# Patient Record
Sex: FEMALE | Race: BLACK OR AFRICAN AMERICAN | NOT HISPANIC OR LATINO | Employment: OTHER | URBAN - METROPOLITAN AREA
[De-identification: names, ages, dates, MRNs, and addresses within clinical notes are randomized per-mention and may not be internally consistent; named-entity substitution may affect disease eponyms.]

---

## 2017-01-05 ENCOUNTER — APPOINTMENT (OUTPATIENT)
Dept: ONCOLOGY | Facility: MEDICAL CENTER | Age: 62
End: 2017-01-05
Attending: INTERNAL MEDICINE
Payer: MEDICAID

## 2017-01-12 ENCOUNTER — OUTPATIENT INFUSION SERVICES (OUTPATIENT)
Dept: ONCOLOGY | Facility: MEDICAL CENTER | Age: 62
End: 2017-01-12
Attending: INTERNAL MEDICINE
Payer: MEDICAID

## 2017-01-12 VITALS
DIASTOLIC BLOOD PRESSURE: 63 MMHG | TEMPERATURE: 97 F | SYSTOLIC BLOOD PRESSURE: 152 MMHG | RESPIRATION RATE: 18 BRPM | WEIGHT: 193.12 LBS | HEIGHT: 68 IN | BODY MASS INDEX: 29.27 KG/M2 | OXYGEN SATURATION: 97 % | HEART RATE: 98 BPM

## 2017-01-12 PROCEDURE — 700111 HCHG RX REV CODE 636 W/ 250 OVERRIDE (IP)

## 2017-01-12 PROCEDURE — 700101 HCHG RX REV CODE 250

## 2017-01-12 PROCEDURE — A4212 NON CORING NEEDLE OR STYLET: HCPCS

## 2017-01-12 PROCEDURE — 96523 IRRIG DRUG DELIVERY DEVICE: CPT

## 2017-01-12 RX ORDER — LIDOCAINE HYDROCHLORIDE 10 MG/ML
INJECTION, SOLUTION INFILTRATION; PERINEURAL
Status: COMPLETED
Start: 2017-01-12 | End: 2017-01-12

## 2017-01-12 RX ADMIN — HEPARIN 500 UNITS: 100 SYRINGE at 09:23

## 2017-01-12 RX ADMIN — LIDOCAINE HYDROCHLORIDE: 10 INJECTION, SOLUTION INFILTRATION; PERINEURAL at 09:14

## 2017-01-12 NOTE — PROGRESS NOTES
Patient arrived for port flush appt; pt states no changes or significant health issues.  Pt applied emla cream prior, requested lidocaine as well.  Lidocaine given, port accessed in sterile field, flushed, brisk blood return noted.  Flushed port per protocol; deaccessed port, placed gauze dressing in place.  Pt left in good spirits, went to  to make next port flush appt for 4-6 weeks.

## 2017-01-25 ENCOUNTER — HOSPITAL ENCOUNTER (EMERGENCY)
Facility: MEDICAL CENTER | Age: 62
End: 2017-01-25
Attending: EMERGENCY MEDICINE
Payer: MEDICAID

## 2017-01-25 ENCOUNTER — APPOINTMENT (OUTPATIENT)
Dept: RADIOLOGY | Facility: MEDICAL CENTER | Age: 62
End: 2017-01-25
Attending: EMERGENCY MEDICINE
Payer: MEDICAID

## 2017-01-25 VITALS
OXYGEN SATURATION: 94 % | DIASTOLIC BLOOD PRESSURE: 87 MMHG | HEART RATE: 88 BPM | BODY MASS INDEX: 29.58 KG/M2 | TEMPERATURE: 97.7 F | SYSTOLIC BLOOD PRESSURE: 136 MMHG | WEIGHT: 188.49 LBS | RESPIRATION RATE: 18 BRPM | HEIGHT: 67 IN

## 2017-01-25 DIAGNOSIS — R10.9 CHRONIC ABDOMINAL PAIN: ICD-10-CM

## 2017-01-25 DIAGNOSIS — E11.9 TYPE 2 DIABETES MELLITUS WITHOUT COMPLICATION, WITHOUT LONG-TERM CURRENT USE OF INSULIN (HCC): ICD-10-CM

## 2017-01-25 DIAGNOSIS — C21.0 ANAL CARCINOMA (HCC): ICD-10-CM

## 2017-01-25 DIAGNOSIS — G89.29 CHRONIC ABDOMINAL PAIN: ICD-10-CM

## 2017-01-25 LAB
ALBUMIN SERPL BCP-MCNC: 4.1 G/DL (ref 3.2–4.9)
ALBUMIN/GLOB SERPL: 1.1 G/DL
ALP SERPL-CCNC: 89 U/L (ref 30–99)
ALT SERPL-CCNC: 10 U/L (ref 2–50)
ANION GAP SERPL CALC-SCNC: 9 MMOL/L (ref 0–11.9)
APPEARANCE UR: CLEAR
APTT PPP: 34.8 SEC (ref 24.7–36)
AST SERPL-CCNC: 15 U/L (ref 12–45)
BACTERIA #/AREA URNS HPF: ABNORMAL /HPF
BASOPHILS # BLD AUTO: 0.2 % (ref 0–1.8)
BASOPHILS # BLD: 0.01 K/UL (ref 0–0.12)
BILIRUB SERPL-MCNC: 0.6 MG/DL (ref 0.1–1.5)
BILIRUB UR QL STRIP.AUTO: NEGATIVE
BUN SERPL-MCNC: 13 MG/DL (ref 8–22)
CALCIUM SERPL-MCNC: 9.4 MG/DL (ref 8.5–10.5)
CHLORIDE SERPL-SCNC: 105 MMOL/L (ref 96–112)
CO2 SERPL-SCNC: 25 MMOL/L (ref 20–33)
COLOR UR: ABNORMAL
CREAT SERPL-MCNC: 0.67 MG/DL (ref 0.5–1.4)
CULTURE IF INDICATED INDCX: YES UA CULTURE
EKG IMPRESSION: NORMAL
EOSINOPHIL # BLD AUTO: 0.04 K/UL (ref 0–0.51)
EOSINOPHIL NFR BLD: 0.6 % (ref 0–6.9)
EPI CELLS #/AREA URNS HPF: ABNORMAL /HPF
ERYTHROCYTE [DISTWIDTH] IN BLOOD BY AUTOMATED COUNT: 45.5 FL (ref 35.9–50)
GFR SERPL CREATININE-BSD FRML MDRD: >60 ML/MIN/1.73 M 2
GLOBULIN SER CALC-MCNC: 3.9 G/DL (ref 1.9–3.5)
GLUCOSE SERPL-MCNC: 109 MG/DL (ref 65–99)
GLUCOSE UR STRIP.AUTO-MCNC: NEGATIVE MG/DL
HCT VFR BLD AUTO: 37.8 % (ref 37–47)
HGB BLD-MCNC: 12.5 G/DL (ref 12–16)
IMM GRANULOCYTES # BLD AUTO: 0.03 K/UL (ref 0–0.11)
IMM GRANULOCYTES NFR BLD AUTO: 0.5 % (ref 0–0.9)
INR PPP: 1.12 (ref 0.87–1.13)
KETONES UR STRIP.AUTO-MCNC: NEGATIVE MG/DL
LEUKOCYTE ESTERASE UR QL STRIP.AUTO: ABNORMAL
LIPASE SERPL-CCNC: 34 U/L (ref 11–82)
LYMPHOCYTES # BLD AUTO: 1.25 K/UL (ref 1–4.8)
LYMPHOCYTES NFR BLD: 19.4 % (ref 22–41)
MCH RBC QN AUTO: 30.8 PG (ref 27–33)
MCHC RBC AUTO-ENTMCNC: 33.1 G/DL (ref 33.6–35)
MCV RBC AUTO: 93.1 FL (ref 81.4–97.8)
MICRO URNS: ABNORMAL
MONOCYTES # BLD AUTO: 0.57 K/UL (ref 0–0.85)
MONOCYTES NFR BLD AUTO: 8.9 % (ref 0–13.4)
NEUTROPHILS # BLD AUTO: 4.53 K/UL (ref 2–7.15)
NEUTROPHILS NFR BLD: 70.4 % (ref 44–72)
NITRITE UR QL STRIP.AUTO: NEGATIVE
NRBC # BLD AUTO: 0 K/UL
NRBC BLD AUTO-RTO: 0 /100 WBC
PH UR STRIP.AUTO: 6.5 [PH]
PLATELET # BLD AUTO: 308 K/UL (ref 164–446)
PMV BLD AUTO: 8.9 FL (ref 9–12.9)
POTASSIUM SERPL-SCNC: 3.9 MMOL/L (ref 3.6–5.5)
PROT SERPL-MCNC: 8 G/DL (ref 6–8.2)
PROT UR QL STRIP: NEGATIVE MG/DL
PROTHROMBIN TIME: 14.8 SEC (ref 12–14.6)
RBC # BLD AUTO: 4.06 M/UL (ref 4.2–5.4)
RBC # URNS HPF: ABNORMAL /HPF
RBC UR QL AUTO: NEGATIVE
SODIUM SERPL-SCNC: 139 MMOL/L (ref 135–145)
SP GR UR STRIP.AUTO: 1.01
WBC # BLD AUTO: 6.4 K/UL (ref 4.8–10.8)
WBC #/AREA URNS HPF: ABNORMAL /HPF

## 2017-01-25 PROCEDURE — 87077 CULTURE AEROBIC IDENTIFY: CPT

## 2017-01-25 PROCEDURE — 80053 COMPREHEN METABOLIC PANEL: CPT

## 2017-01-25 PROCEDURE — 700117 HCHG RX CONTRAST REV CODE 255: Performed by: EMERGENCY MEDICINE

## 2017-01-25 PROCEDURE — 74177 CT ABD & PELVIS W/CONTRAST: CPT

## 2017-01-25 PROCEDURE — 99284 EMERGENCY DEPT VISIT MOD MDM: CPT

## 2017-01-25 PROCEDURE — 93005 ELECTROCARDIOGRAM TRACING: CPT

## 2017-01-25 PROCEDURE — 85610 PROTHROMBIN TIME: CPT

## 2017-01-25 PROCEDURE — 73700 CT LOWER EXTREMITY W/O DYE: CPT | Mod: RT

## 2017-01-25 PROCEDURE — 83690 ASSAY OF LIPASE: CPT

## 2017-01-25 PROCEDURE — 96374 THER/PROPH/DIAG INJ IV PUSH: CPT | Mod: XU

## 2017-01-25 PROCEDURE — 700105 HCHG RX REV CODE 258: Performed by: EMERGENCY MEDICINE

## 2017-01-25 PROCEDURE — 96376 TX/PRO/DX INJ SAME DRUG ADON: CPT

## 2017-01-25 PROCEDURE — 700111 HCHG RX REV CODE 636 W/ 250 OVERRIDE (IP): Performed by: EMERGENCY MEDICINE

## 2017-01-25 PROCEDURE — 96375 TX/PRO/DX INJ NEW DRUG ADDON: CPT

## 2017-01-25 PROCEDURE — 96361 HYDRATE IV INFUSION ADD-ON: CPT

## 2017-01-25 PROCEDURE — 700101 HCHG RX REV CODE 250: Performed by: EMERGENCY MEDICINE

## 2017-01-25 PROCEDURE — 87086 URINE CULTURE/COLONY COUNT: CPT

## 2017-01-25 PROCEDURE — 81001 URINALYSIS AUTO W/SCOPE: CPT

## 2017-01-25 PROCEDURE — 85025 COMPLETE CBC W/AUTO DIFF WBC: CPT

## 2017-01-25 PROCEDURE — 85730 THROMBOPLASTIN TIME PARTIAL: CPT

## 2017-01-25 PROCEDURE — 36415 COLL VENOUS BLD VENIPUNCTURE: CPT

## 2017-01-25 RX ORDER — MORPHINE SULFATE 4 MG/ML
4 INJECTION, SOLUTION INTRAMUSCULAR; INTRAVENOUS
Status: DISCONTINUED | OUTPATIENT
Start: 2017-01-25 | End: 2017-01-25 | Stop reason: HOSPADM

## 2017-01-25 RX ORDER — ONDANSETRON 2 MG/ML
4 INJECTION INTRAMUSCULAR; INTRAVENOUS ONCE
Status: COMPLETED | OUTPATIENT
Start: 2017-01-25 | End: 2017-01-25

## 2017-01-25 RX ORDER — SODIUM CHLORIDE 9 MG/ML
1000 INJECTION, SOLUTION INTRAVENOUS ONCE
Status: COMPLETED | OUTPATIENT
Start: 2017-01-25 | End: 2017-01-25

## 2017-01-25 RX ADMIN — HEPARIN 500 UNITS: 100 SYRINGE at 17:41

## 2017-01-25 RX ADMIN — MORPHINE SULFATE 4 MG: 4 INJECTION INTRAVENOUS at 17:09

## 2017-01-25 RX ADMIN — IOHEXOL 100 ML: 350 INJECTION, SOLUTION INTRAVENOUS at 16:09

## 2017-01-25 RX ADMIN — ONDANSETRON 4 MG: 2 INJECTION, SOLUTION INTRAMUSCULAR; INTRAVENOUS at 14:32

## 2017-01-25 RX ADMIN — MORPHINE SULFATE 4 MG: 4 INJECTION INTRAVENOUS at 14:32

## 2017-01-25 RX ADMIN — SODIUM CHLORIDE 1000 ML: 9 INJECTION, SOLUTION INTRAVENOUS at 14:38

## 2017-01-25 RX ADMIN — TETRACAINE HCL 3 ML: 10 INJECTION SUBARACHNOID at 13:56

## 2017-01-25 ASSESSMENT — ENCOUNTER SYMPTOMS
DIARRHEA: 0
FEVER: 0
CHILLS: 0
FALLS: 1
VOMITING: 0
NAUSEA: 1
ABDOMINAL PAIN: 1

## 2017-01-25 ASSESSMENT — PAIN SCALES - GENERAL
PAINLEVEL_OUTOF10: 3
PAINLEVEL_OUTOF10: 6
PAINLEVEL_OUTOF10: 4
PAINLEVEL_OUTOF10: 10

## 2017-01-25 ASSESSMENT — LIFESTYLE VARIABLES: DO YOU DRINK ALCOHOL: NO

## 2017-01-25 NOTE — ED NOTES
C/o R hip/groin pain s/p mglf 1 week ago, pain has varied, able to walk unable to straighten leg w/o pain increasing, and abdo pain lower, assoc w mild nausea, no vomiting, does not feel she is completely emptying her bowels, has hx of rectal ca, last chemo 9/16, recently told she is ' cancer free'

## 2017-01-25 NOTE — ED NOTES
Pt. Resting in gurney drinking contrast at this time. Pt. States no additional needs. Will continue to monitor.

## 2017-01-25 NOTE — ED AVS SNAPSHOT
After Visit Summary                                                                                                                Marilyn Strange   MRN: [id number]    Department:  [facility], Emergency Dept   Date of Visit:  1/25/2017            [facility], Emergency Dept    [address] [address]    Phone:  [phone]      You were seen by     Markus Zaragoza M.D.      Your Diagnosis Was     Chronic abdominal pain     R10.9, G89.29       These are the medications you received during your hospitalization from 01/25/2017 1240 to 01/25/2017 1735     Date/Time Order Dose Route Action    01/25/2017 1356 lidocaine-epinephrine-tetracaine (LET) topical soln 3 mL 3 mL Topical Given    01/25/2017 1438 NS infusion 1,000 mL 1,000 mL Intravenous New Bag    01/25/2017 1709 morphine (pf) 4 mg/ml injection 4 mg 4 mg Intravenous Given    01/25/2017 1432 morphine (pf) 4 mg/ml injection 4 mg 4 mg Intravenous Given    01/25/2017 1432 ondansetron (ZOFRAN) syringe/vial injection 4 mg 4 mg Intravenous Given    01/25/2017 1609 iohexol (OMNIPAQUE) 350 mg/mL 100 mL Intravenous Given      Follow-up Information     1. Schedule an appointment as soon as possible for a visit with Terry Dugan M.D..    Specialty:  Oncology    Contact information    [address] [address] [phone]        Medication Information     Review all of your home medications and newly ordered medications with your primary doctor and/or pharmacist as soon as possible. Follow medication instructions as directed by your doctor and/or pharmacist.     Please keep your complete medication list with you and share with your physician. Update the information when medications are discontinued, doses are changed, or new medications (including over-the-counter products) are added; and carry medication information at all times in the event of emergency situations.               Medication List         ASK your doctor about these medications        Instructions    albuterol 108 (90 BASE) MCG/ACT Aers inhalation aerosol    Inhale 2 Puffs by mouth every 6 hours as needed for Shortness of Breath (wheezing).   Dose:  2 Puff       aspirin 81 MG Chew chewable tablet   Commonly known as:  ASA    Take 81 mg by mouth every day.   Dose:  81 mg       atorvastatin 20 MG Tabs   Commonly known as:  LIPITOR    Take 20 mg by mouth every evening.   Dose:  20 mg       BREO ELLIPTA 100-25 MCG/INH Aepb   Generic drug:  Fluticasone Furoate-Vilanterol    Inhale  by mouth.       carvedilol 25 MG Tabs   Commonly known as:  COREG    Take 25 mg by mouth 2 times a day.   Dose:  25 mg       enalapril 20 MG tablet   Commonly known as:  VASOTEC    Take 20 mg by mouth 2 times a day.   Dose:  20 mg       metformin 500 MG Tabs   Commonly known as:  GLUCOPHAGE    Take 500 mg by mouth every day.   Dose:  500 mg       ondansetron 4 MG Tabs tablet   Commonly known as:  ZOFRAN    Take 1 Tab by mouth every four hours as needed for Nausea/Vomiting.   Dose:  4 mg       oxycodone immediate-release 5 MG Tabs   Commonly known as:  ROXICODONE    Take 1 Tab by mouth every 8 hours as needed for Severe Pain.   Dose:  5 mg       oyster shell calcium/vitamin D 250-125 MG-UNIT Tabs tablet    Take 1 Tab by mouth 2 times a day.   Dose:  1 Tab       pantoprazole 40 MG Tbec   Commonly known as:  PROTONIX    Take 40 mg by mouth every day.   Dose:  40 mg       prochlorperazine 10 MG Tabs   Commonly known as:  COMPAZINE    Take 1 Tab by mouth every 6 hours as needed for Nausea/Vomiting.   Dose:  10 mg               Procedures and tests performed during your visit     APTT    CBC WITH DIFFERENTIAL    COMP METABOLIC PANEL    CONSENT FOR CONTRAST INJECTION    CT-ABDOMEN-PELVIS WITH    CT-HIP W/O PLUS RECONS RIGHT    EKG (NOW)    ESTIMATED GFR    FALL RISK PROTOCOL    IV Saline Lock    LIPASE    PROTHROMBIN TIME (INR)    URINALYSIS CULTURE, IF INDICATED    URINE  CULTURE(NEW)    URINE MICROSCOPIC (W/UA)        Discharge Instructions       Abdominal Pain (Nonspecific)  Your exam might not show the exact reason you have abdominal pain. Since there are many different causes of abdominal pain, another checkup and more tests may be needed. It is very important to follow up for lasting (persistent) or worsening symptoms. A possible cause of abdominal pain in any person who still has his or her appendix is acute appendicitis. Appendicitis is often hard to diagnose. Normal blood tests, urine tests, ultrasound, and CT scans do not completely rule out early appendicitis or other causes of abdominal pain. Sometimes, only the changes that happen over time will allow appendicitis and other causes of abdominal pain to be determined. Other potential problems that may require surgery may also take time to become more apparent. Because of this, it is important that you follow all of the instructions below.  HOME CARE INSTRUCTIONS   · Rest as much as possible.   · Do not eat solid food until your pain is gone.   · While adults or children have pain: A diet of water, weak decaffeinated tea, broth or bouillon, gelatin, oral rehydration solutions (ORS), frozen ice pops, or ice chips may be helpful.   · When pain is gone in adults or children: Start a light diet (dry toast, crackers, applesauce, or white rice). Increase the diet slowly as long as it does not bother you. Eat no dairy products (including cheese and eggs) and no spicy, fatty, fried, or high-fiber foods.   · Use no alcohol, caffeine, or cigarettes.   · Take your regular medicines unless your caregiver told you not to.   · Take any prescribed medicine as directed.   · Only take over-the-counter or prescription medicines for pain, discomfort, or fever as directed by your caregiver. Do not give aspirin to children.   If your caregiver has given you a follow-up appointment, it is very important to keep that appointment. Not keeping the  appointment could result in a permanent injury and/or lasting (chronic) pain and/or disability. If there is any problem keeping the appointment, you must call to reschedule.   SEEK IMMEDIATE MEDICAL CARE IF:   · Your pain is not gone in 24 hours.   · Your pain becomes worse, changes location, or feels different.   · You or your child has an oral temperature above 102° F (38.9° C), not controlled by medicine.   · Your baby is older than 3 months with a rectal temperature of 102° F (38.9° C) or higher.   · Your baby is 3 months old or younger with a rectal temperature of 100.4° F (38° C) or higher.   · You have shaking chills.   · You keep throwing up (vomiting) or cannot drink liquids.   · There is blood in your vomit or you see blood in your bowel movements.   · Your bowel movements become dark or black.   · You have frequent bowel movements.   · Your bowel movements stop (become blocked) or you cannot pass gas.   · You have bloody, frequent, or painful urination.   · You have yellow discoloration in the skin or whites of the eyes.   · Your stomach becomes bloated or bigger.   · You have dizziness or fainting.   · You have chest or back pain.   MAKE SURE YOU:   · Understand these instructions.   · Will watch your condition.   · Will get help right away if you are not doing well or get worse.   Document Released: 12/18/2006 Document Revised: 03/11/2013 Document Reviewed: 11/15/2010  ExitCare® Patient Information ©2013 [employer].          Patient Information     Patient Information    Following emergency treatment: all patient requiring follow-up care must return either to a private physician or a clinic if your condition worsens before you are able to obtain further medical attention, please return to the emergency room.     Billing Information    At [facility], we work to make the billing process streamlined for our patients.  Our Representatives are here to answer any questions you may have regarding your  hospital bill.  If you have insurance coverage and have supplied your insurance information to us, we will submit a claim to your insurer on your behalf.  Should you have any questions regarding your bill, we can be reached online or by phone as follows:  Online: You are able pay your bills online or live chat with our representatives about any billing questions you may have. We are here to help Monday - Friday from 8:00am to 7:30pm and 9:00am - 12:00pm on Saturdays.  Please visit [email or web address]  for more information.   Phone:  [phone] or [phone]    Please note that your emergency physician, surgeon, pathologist, radiologist, anesthesiologist, and other specialists are not employed by [facility] and will therefore bill separately for their services.  Please contact them directly for any questions concerning their bills at the numbers below:     Emergency Physician Services:  [phone]  [facility]:  [phone]  [facility]:  [phone]  [facility]:  [phone]    1. Your final bill may vary from the amount quoted upon discharge if all procedures are not complete at that time, or if your doctor has additional procedures of which we are not aware. You will receive an additional bill if you return to the Emergency Department at [facility] for suture removal regardless of the facility of which the sutures were placed.     2. Please arrange for settlement of this account at the emergency registration.    3. All self-pay accounts are due in full at the time of treatment.  If you are unable to meet this obligation then payment is expected within 4-5 days.     4. If you have had radiology studies (CT, X-ray, Ultrasound, MRI), you have received a preliminary result during your emergency department visit. Please contact the radiology department [phone] to receive a copy of your final result. Please discuss the  Final result with your primary physician or with the follow up physician provided.     Crisis Hotline:  [place] Crisis Hotline:  9-265-NXBEKZT or 1-655.403.2383  [place] Crisis Hotline:    1-610.577.7817 or 648-779-4222         ED Discharge Follow Up Questions    1. In order to provide you with very good care, we would like to follow up with a phone call in the next few days.  May we have your permission to contact you?     YES /  NO    2. What is the best phone number to call you? (       )_____-__________    3. What is the best time to call you?      Morning  /  Afternoon  /  Evening                   Patient Signature:  ____________________________________________________________    Date:  ____________________________________________________________      Your appointments     Feb 09, 2017  1:30 PM   EST Port Flush / Central Line Care with RN 5   Infusion Services ([facility])    [address]  [place] [address]   [phone]

## 2017-01-25 NOTE — ED NOTES
Port accessed, refer to lines for specifics about pt's power port. Pt. Tolerated procedure well. Will continue to monitor.

## 2017-01-25 NOTE — ED AVS SNAPSHOT
Planet Payment Access Code: [email or web address]  Expires: 2/24/2017  2:55 PM    Planet Payment  A secure, online tool to manage your health information     [employer]’s Planet Payment® is a secure, online tool that connects you to your personalized health information from the privacy of your home -- day or night - making it very easy for you to manage your healthcare. Once the activation process is completed, you can even access your medical information using the Planet Payment isidoro, which is available for free in the Apple Isidoro store or Google Play store.     Planet Payment provides the following levels of access (as shown below):   My Chart Features   [facility] Primary Care Doctor [facility]  Specialists [facility]  Urgent  Care [facility]  Primary Care  Doctor   Email your healthcare team securely and privately 24/7 X X X X   Manage appointments: schedule your next appointment; view details of past/upcoming appointments X      Request prescription refills. X      View recent personal medical records, including lab and immunizations X X X X   View health record, including health history, allergies, medications X X X X   Read reports about your outpatient visits, procedures, consult and ER notes X X X X   See your discharge summary, which is a recap of your hospital and/or ER visit that includes your diagnosis, lab results, and care plan. X X       How to register for Planet Payment:  1. Go to  [email or web address].  2. Click on the Sign Up Now box, which takes you to the New Member Sign Up page. You will need to provide the following information:  a. Enter your Planet Payment Access Code exactly as it appears at the top of this page. (You will not need to use this code after you’ve completed the sign-up process. If you do not sign up before the expiration date, you must request a new code.)   b. Enter your date of birth.   c. Enter your home email address.   d. Click Submit, and follow the next screen’s instructions.  3. Create a Planet Payment ID. This will be your Planet Payment  login ID and cannot be changed, so think of one that is secure and easy to remember.  4. Create a [employer] password. You can change your password at any time.  5. Enter your Password Reset Question and Answer. This can be used at a later time if you forget your password.   6. Enter your e-mail address. This allows you to receive e-mail notifications when new information is available in [employer].  7. Click Sign Up. You can now view your health information.    For assistance activating your [employer] account, call [phone]

## 2017-01-25 NOTE — ED AVS SNAPSHOT
1/25/2017          Marilyn Cox Strange  [address]  [place] [address]    Dear Marilyn:    [facility] wants to ensure your discharge home is safe and you or your loved ones have had all your questions answered regarding your care after you leave the hospital.    You may receive a telephone call within two days of your discharge.  This call is to make certain you understand your discharge instructions as well as ensure we provided you with the best care possible during your stay with us.     The call will only last approximately 3-5 minutes and will be done by a nurse.    Once again, we want to ensure your discharge home is safe and that you have a clear understanding of any next steps in your care.  If you have any questions or concerns, please do not hesitate to contact us, we are here for you.  Thank you for choosing [facility] for your healthcare needs.    Sincerely,    Mohan Marshall    [facility]

## 2017-01-25 NOTE — ED PROVIDER NOTES
ED Provider Note    Scribed for Markus Zaragoza M.D. by Rylie Swift. 1/25/2017, 1:49 PM.    Primary care provider: REMY [place]  Means of arrival: Walk-in  History obtained from: Patient  History limited by: None    CHIEF COMPLAINT  Chief Complaint   Patient presents with   • Hip Pain   • Abdominal Pain       HPI  Marilyn Strange is a 61 y.o. female who presents to the Emergency Department for worsening lower abdominal pains onset yesterday. The patient reports of developing intermittent lower abdominal pains for the past 2-3 months, but states the pains significantly worsened yesterday and has been constant in nature since. She has associated nausea, but denies any vomiting, diarrhea, fever, chills, or dysuria. The patient also reports of developing right hip/groin pain a week ago after a mechanical ground level fall. She states the pain exacerbates with walking and standing erect and often has to ambulate hunched over. She denies hip pain radiating down bilateral legs or back. The patient has past history of rectal cancer, with 30 days of radiation and 2 infusions, last finished chemotherapy on 9/16, and is still in the process of healing. She follows up with Dr. Sue (Oncology). She notes no allergy to medications.     REVIEW OF SYSTEMS  Review of Systems   Constitutional: Negative for fever and chills.   Gastrointestinal: Positive for nausea and abdominal pain. Negative for vomiting and diarrhea.   Genitourinary: Negative for dysuria.   Musculoskeletal: Positive for joint pain (Hip pain right) and falls.   All other systems reviewed and are negative.    PAST MEDICAL HISTORY   has a past medical history of Rectal cancer (CMS-[facility]); Diabetes (CMS-HCC); Hypertension; Cancer (CMS-[facility]); Cervical cancer (CMS-[facility]); and Breast cancer (CMS-[facility]).    SURGICAL HISTORY   has past surgical history that includes gyn surgery; other orthopedic surgery; and mastectomy.    SOCIAL HISTORY  Social History   Substance  "Use Topics   • Smoking status: Former Smoker   • Smokeless tobacco: Former User     Quit date: 08/21/2007   • Alcohol Use: No      History   Drug Use No       FAMILY HISTORY  History reviewed. No pertinent family history.    CURRENT MEDICATIONS  Home Medications     Reviewed by Lanette Smith R.N. (Registered Nurse) on 01/25/17 at 1337  Med List Status: Partial    Medication Last Dose Status    albuterol 108 (90 BASE) MCG/ACT Aero Soln inhalation aerosol  Active    aspirin (ASA) 81 MG Chew Tab chewable tablet  Active    atorvastatin (LIPITOR) 20 MG Tab  Active    Calcium Carb-Cholecalciferol (OYSTER SHELL CALCIUM/VITAMIN D) 250-125 MG-UNIT Tab tablet  Active    carvedilol (COREG) 25 MG Tab  Active    enalapril (VASOTEC) 20 MG tablet  Active    Fluticasone Furoate-Vilanterol (BREO ELLIPTA) 100-25 MCG/INH AEROSOL POWDER, BREATH ACTIVATED  Active    metformin (GLUCOPHAGE) 500 MG Tab  Active    ondansetron (ZOFRAN) 4 MG Tab tablet  Active    oxycodone immediate-release (ROXICODONE) 5 MG Tab  Active    pantoprazole (PROTONIX) 40 MG Tablet Delayed Response  Active    prochlorperazine (COMPAZINE) 10 MG Tab  Active                ALLERGIES  No Known Allergies    PHYSICAL EXAM  VITAL SIGNS: /87 mmHg  Pulse 96  Temp(Src) 36.5 °C (97.7 °F) (Temporal)  Resp 20  Ht 1.702 m (5' 7\")  Wt 85.5 kg (188 lb 7.9 oz)  BMI 29.52 kg/m2  SpO2 98%    Constitutional: Well developed, Well nourished, No acute distress, Non-toxic appearance.   HENT: Normocephalic, Atraumatic, Bilateral external ears normal, oropharynx moist, No oral exudates, Nose normal.   Eyes: Pupils are equal round and react to light, extraocular motions are intact, conjunctiva is normal, there are no signs of exudate.   Neck: Supple, no meningeal signs.  Lymphatic: No lymphadenopathy noted.   Cardiovascular: Regular rate and rhythm without murmurs gallops or rubs.   Thorax & Lungs: No respiratory distress. Breathing comfortably. Lungs are clear to " auscultation bilaterally, there are no wheezes no rales. Chest wall is nontender.  Abdomen: Soft, tenderness diffusely more in suprapubic area, nondistended.  Skin: Warm, Dry, No erythema,   Back: No tenderness, No CVA tenderness.  Musculoskeletal: Right hip holds in flexed position limited range of motion secondary to pain in right groin area.  Intact distal pulses, no clubbing, no cyanosis, no edema,   Neurologic: Alert & oriented x 3, Moving all extremities. No gross abnormalities.    Psychiatric: Affect normal, Judgment normal, Mood normal.     LABS  Results for orders placed or performed during the hospital encounter of 01/25/17   CBC WITH DIFFERENTIAL   Result Value Ref Range    WBC 6.4 4.8 - 10.8 K/uL    RBC 4.06 (L) 4.20 - 5.40 M/uL    Hemoglobin 12.5 12.0 - 16.0 g/dL    Hematocrit 37.8 37.0 - 47.0 %    MCV 93.1 81.4 - 97.8 fL    MCH 30.8 27.0 - 33.0 pg    MCHC 33.1 (L) 33.6 - 35.0 g/dL    RDW 45.5 35.9 - 50.0 fL    Platelet Count 308 164 - 446 K/uL    MPV 8.9 (L) 9.0 - 12.9 fL    Neutrophils-Polys 70.40 44.00 - 72.00 %    Lymphocytes 19.40 (L) 22.00 - 41.00 %    Monocytes 8.90 0.00 - 13.40 %    Eosinophils 0.60 0.00 - 6.90 %    Basophils 0.20 0.00 - 1.80 %    Immature Granulocytes 0.50 0.00 - 0.90 %    Nucleated RBC 0.00 /100 WBC    Neutrophils (Absolute) 4.53 2.00 - 7.15 K/uL    Lymphs (Absolute) 1.25 1.00 - 4.80 K/uL    Monos (Absolute) 0.57 0.00 - 0.85 K/uL    Eos (Absolute) 0.04 0.00 - 0.51 K/uL    Baso (Absolute) 0.01 0.00 - 0.12 K/uL    Immature Granulocytes (abs) 0.03 0.00 - 0.11 K/uL    NRBC (Absolute) 0.00 K/uL   COMP METABOLIC PANEL   Result Value Ref Range    Sodium 139 135 - 145 mmol/L    Potassium 3.9 3.6 - 5.5 mmol/L    Chloride 105 96 - 112 mmol/L    Co2 25 20 - 33 mmol/L    Anion Gap 9.0 0.0 - 11.9    Glucose 109 (H) 65 - 99 mg/dL    Bun 13 8 - 22 mg/dL    Creatinine 0.67 0.50 - 1.40 mg/dL    Calcium 9.4 8.5 - 10.5 mg/dL    AST(SGOT) 15 12 - 45 U/L    ALT(SGPT) 10 2 - 50 U/L    Alkaline  Phosphatase 89 30 - 99 U/L    Total Bilirubin 0.6 0.1 - 1.5 mg/dL    Albumin 4.1 3.2 - 4.9 g/dL    Total Protein 8.0 6.0 - 8.2 g/dL    Globulin 3.9 (H) 1.9 - 3.5 g/dL    A-G Ratio 1.1 g/dL   URINALYSIS CULTURE, IF INDICATED   Result Value Ref Range    Micro Urine Req Microscopic     Color Lt. Yellow     Character Clear     Specific Gravity 1.007 <1.035    Ph 6.5 5.0-8.0    Glucose Negative Negative mg/dL    Ketones Negative Negative mg/dL    Protein Negative Negative mg/dL    Bilirubin Negative Negative    Nitrite Negative Negative    Leukocyte Esterase Trace (A) Negative    Occult Blood Negative Negative    Culture Indicated Yes UA Culture   LIPASE   Result Value Ref Range    Lipase 34 11 - 82 U/L   PROTHROMBIN TIME (INR)   Result Value Ref Range    PT 14.8 (H) 12.0 - 14.6 sec    INR 1.12 0.87 - 1.13   APTT   Result Value Ref Range    APTT 34.8 24.7 - 36.0 sec   URINE MICROSCOPIC (W/UA)   Result Value Ref Range    WBC 2-5 /hpf    RBC 0-2 /hpf    Bacteria Few (A) None /hpf    Epithelial Cells Few /hpf   ESTIMATED GFR   Result Value Ref Range    GFR If African American >60 >60 mL/min/1.73 m 2    GFR If Non African American >60 >60 mL/min/1.73 m 2   EKG (NOW)   Result Value Ref Range    Report       [facility] Emergency Dept.    Test Date:  2017  Pt Name:    ABDULAZIZ [place]                    Department: ER  MRN:        [id number]                      Room:  Gender:     F                            Technician: [id number]  :        [id number]                   Requested By:ER TRIAGE PROTOCOL  Order #:    [id number]                    Reading MD: THANG ALEXANDRA MD    Measurements  Intervals                                Axis  Rate:       96                           P:          75  CA:         176                          QRS:        -61  QRSD:       118                          T:          45  QT:         364  QTc:        460    Interpretive Statements  SINUS RHYTHM  ATRIAL PREMATURE COMPLEX  PROBABLE  LEFT ATRIAL ABNORMALITY  INCOMPLETE RBBB AND LAFB  LEFT VENTRICULAR HYPERTROPHY  Compared to ECG 08/21/2016 21:03:52  Atrial premature complex(es) now present  Incomplete right bundle-branch block now present  Left ventricular hypertrophy now present    Electronicall y Signed On 1- 16:23:54 PST by THANG ALEXANDRA MD     All labs reviewed by me.    EKG  Interpreted by me    Rhythm: normal sinus  Rate: 91 which is normal  Axis: Leftward -61  Ectopy: Occasional premature atrial complexes, RBBB with left ventricular hypertrophy  ST Segments: no acute change  T Waves: no acute change  Q Waves: none    Clinical Impression: Non-specific EKG and no changes from prior EKG.    RADIOLOGY  CT-ABDOMEN-PELVIS WITH   Final Result      1.  Marked presacral and perirectal edema or definition of the walls of the rectosigmoid colon similar to previous findings. No definite focal mass identified. Endoscopic follow-up is consideration.   2.  Since previous examination, mild/moderate right and mild left hydronephrosis and dilatation proximal ureters. Cannot exclude distal ureteral obstruction possibly related to posttreatment changes.   3.  Mild dilatation of the pancreatic duct unchanged. No distal obstructing stone or lesion identified but not excluded by CT.      CT-HIP W/O PLUS RECONS RIGHT   Final Result      1.  No evidence of acute fracture or dislocation in the right hip joint.      2.  No bone erosion or bone deformity is identified.      The radiologist's interpretation of all radiological studies have been reviewed by me.    COURSE & MEDICAL DECISION MAKING  Pertinent Labs & Imaging studies reviewed. (See chart for details)    1:49 PM - Patient seen and examined at bedside. Patient will be treated with normal saline 1L infusion, Zofran 4mg IV, and morphine 4mg IV. Ordered EKG, CT-hip right without, CT-abdomen-pelvis with, CBC, CMP, urinalysis culture, lipase, prothrombin time, urine microscopic, APTT to evaluate her  symptoms. The differential diagnoses include but are not limited to: UTI, intraabdominal infection, scar tissue secondary to radiation, mass, fracture.    5:11 PM Reviewed the patient's lab and imaging results, which are noted above.      5:23 PM Patient was reevaluated at bedside. The patient is sleeping comfortably in bed, but continues to report of pain. Discussed lab and radiology results with the patient and informed them that results were unremarkable for infection. Symptoms are likely secondary to radiation, so treatment is likely pain management. The patient states she takes pain medications at home with minimal relief, often taking 2-3 pills at a time for relief. Discussed that the patient's CT hip shows no fractures, so again treatment is pain management. Advised the patient to call her Oncologist tomorrow to make an appointment for follow up and PCP regarding today's visit. The patient informs me she has enough pain medications at home and feels comfortable being discharged home at this time. The patient will be discharged and should return if symptoms worsen or if new symptoms arise. The patient understands and agrees to plan.     Decision Making:  Patient presents with complaints of lower abdominal pain. The suprapubic area. This is been continuous in nature. She states for the past several months. CT scan was done with laboratory studies. Laboratory studies are essentially normal. CT scan does show the above changes. The only difference from prior CT scan was a hydronephrosis. However, her bladder is rather full which her creatinine is normal. She is urinating normally. Her urine appears normal, so I do not feel that this is an outlet obstruction on either side. Sometimes the patient can have stenosis, but with a full bladder, could just be back up from the full bladder. At this point in my opinion. There is no significant changes. She does have the edema or fullness to the rectum. Her last treatments  were back in September. 3. Possible this is all scar tissue. She is scheduled to see a GI doctor for another endoscopy. She states in 3 weeks. At this point, I feel symptomatic treatment is most appropriate course. She currently is taking pain medications prescribed by Dr. Dugan. Recommended follow-up Dr. Dugan for recheck and her GI doctor for lower endoscopy as scheduled. Patient should return if any symptoms significantly change or worsen.    The patient will return for new or worsening symptoms and is stable at the time of discharge.    The patient is referred to a primary physician for diabetic screening and for all other preventative health concerns.    DISPOSITION:  Patient will be discharged home in stable condition.    FOLLOW UP:  Terry Dugan M.D.  [address] [place] [address] [address]  [facility] [id number] [phone]    Schedule an appointment as soon as possible for a visit      FINAL IMPRESSION  1. Chronic abdominal pain    2. Anal carcinoma (CMS-HCC)    3. Type 2 diabetes mellitus without complication, without long-term current use of insulin (HCC)         IRylie (Binh), am scribing for, and in the presence of, Markus Zaragoza M.D..    Electronically signed by: Rylie Swift (Binh), 1/25/2017    IMarkus M.D. personally performed the services described in this documentation, as scribed by Rylie Swift in my presence, and it is both accurate and complete.    The note accurately reflects work and decisions made by me.  Markus Zaragoza  1/25/2017  8:51 PM

## 2017-01-26 ENCOUNTER — PATIENT OUTREACH (OUTPATIENT)
Dept: HEALTH INFORMATION MANAGEMENT | Facility: OTHER | Age: 62
End: 2017-01-26

## 2017-01-26 NOTE — DISCHARGE INSTRUCTIONS
Abdominal Pain (Nonspecific)  Your exam might not show the exact reason you have abdominal pain. Since there are many different causes of abdominal pain, another checkup and more tests may be needed. It is very important to follow up for lasting (persistent) or worsening symptoms. A possible cause of abdominal pain in any person who still has his or her appendix is acute appendicitis. Appendicitis is often hard to diagnose. Normal blood tests, urine tests, ultrasound, and CT scans do not completely rule out early appendicitis or other causes of abdominal pain. Sometimes, only the changes that happen over time will allow appendicitis and other causes of abdominal pain to be determined. Other potential problems that may require surgery may also take time to become more apparent. Because of this, it is important that you follow all of the instructions below.  HOME CARE INSTRUCTIONS   · Rest as much as possible.   · Do not eat solid food until your pain is gone.   · While adults or children have pain: A diet of water, weak decaffeinated tea, broth or bouillon, gelatin, oral rehydration solutions (ORS), frozen ice pops, or ice chips may be helpful.   · When pain is gone in adults or children: Start a light diet (dry toast, crackers, applesauce, or white rice). Increase the diet slowly as long as it does not bother you. Eat no dairy products (including cheese and eggs) and no spicy, fatty, fried, or high-fiber foods.   · Use no alcohol, caffeine, or cigarettes.   · Take your regular medicines unless your caregiver told you not to.   · Take any prescribed medicine as directed.   · Only take over-the-counter or prescription medicines for pain, discomfort, or fever as directed by your caregiver. Do not give aspirin to children.   If your caregiver has given you a follow-up appointment, it is very important to keep that appointment. Not keeping the appointment could result in a permanent injury and/or lasting (chronic) pain  and/or disability. If there is any problem keeping the appointment, you must call to reschedule.   SEEK IMMEDIATE MEDICAL CARE IF:   · Your pain is not gone in 24 hours.   · Your pain becomes worse, changes location, or feels different.   · You or your child has an oral temperature above 102° F (38.9° C), not controlled by medicine.   · Your baby is older than 3 months with a rectal temperature of 102° F (38.9° C) or higher.   · Your baby is 3 months old or younger with a rectal temperature of 100.4° F (38° C) or higher.   · You have shaking chills.   · You keep throwing up (vomiting) or cannot drink liquids.   · There is blood in your vomit or you see blood in your bowel movements.   · Your bowel movements become dark or black.   · You have frequent bowel movements.   · Your bowel movements stop (become blocked) or you cannot pass gas.   · You have bloody, frequent, or painful urination.   · You have yellow discoloration in the skin or whites of the eyes.   · Your stomach becomes bloated or bigger.   · You have dizziness or fainting.   · You have chest or back pain.   MAKE SURE YOU:   · Understand these instructions.   · Will watch your condition.   · Will get help right away if you are not doing well or get worse.   Document Released: 12/18/2006 Document Revised: 03/11/2013 Document Reviewed: 11/15/2010  [employer]® Patient Information ©2013 [employer].

## 2017-01-26 NOTE — ED NOTES
"Discharge instructions given to patient, a verbal understanding of all instructions was stated. IV removed, cathlon intact, site without s/s of infection. Pt preferred to walk out so \"as not to have her hip become stiff.\" VSS, all belongings accounted for.    "

## 2017-01-27 LAB
BACTERIA UR CULT: ABNORMAL
BACTERIA UR CULT: ABNORMAL
SIGNIFICANT IND 70042: ABNORMAL
SITE SITE: ABNORMAL
SOURCE SOURCE: ABNORMAL

## 2017-01-31 NOTE — ED NOTES
ED Positive Culture Follow-up/Notification Note:    Date: 1/30/17     Patient seen in the ED on 1/25/2017 for intermittent abdominal pain.  She reports pain developing post mechanical ground level fall a week prior.     1. Chronic abdominal pain    2. Anal carcinoma (CMS-HCC)    3. Type 2 diabetes mellitus without complication, without long-term current use of insulin ([facility])       Discharge Medication List as of 1/25/2017  5:35 PM          Allergies: Review of patient's allergies indicates no known allergies.     Final cultures:   Results     Procedure Component Value Units Date/Time    URINE CULTURE(NEW) [[id number]]  (Abnormal) Collected:  01/25/17 1448    Order Status:  Completed Specimen Information:  Urine Updated:  01/27/17 0830     Significant Indicator POS (POS)      Source UR      Site --      Urine Culture Mixed skin eunice 10-50,000 cfu/mL (A)      Urine Culture -- (A)      Result:        Streptococcus agalactiae (Group B)  10-50,000 cfu/mL      URINALYSIS CULTURE, IF INDICATED [[id number]]  (Abnormal) Collected:  01/25/17 1448    Order Status:  Completed Specimen Information:  Other Updated:  01/25/17 1501     Micro Urine Req Microscopic      Color Lt. Yellow      Character Clear      Specific Gravity 1.007      Ph 6.5      Glucose Negative mg/dL      Ketones Negative mg/dL      Protein Negative mg/dL      Bilirubin Negative      Nitrite Negative      Leukocyte Esterase Trace (A)      Occult Blood Negative      Culture Indicated Yes UA Culture           Plan:   Culture with Group B Streptococcus is likely normal skin colonizer and not active infection.  Patient did not describe any symptoms of infection (dysuria, increased urinary frequency or urgency or noted any fever or chills) and WBC was unremarkable at 6.4.   Patient was not prescribed any antibiotics, which is appropriate. No changes required based upon culture result.       Tawana Allen, PharmD.  PGY-1 Resident

## 2017-02-15 DIAGNOSIS — Z01.812 PRE-OPERATIVE LABORATORY EXAMINATION: ICD-10-CM

## 2017-02-15 LAB
ANION GAP SERPL CALC-SCNC: 7 MMOL/L (ref 0–11.9)
BUN SERPL-MCNC: 7 MG/DL (ref 8–22)
CALCIUM SERPL-MCNC: 9.7 MG/DL (ref 8.4–10.2)
CHLORIDE SERPL-SCNC: 101 MMOL/L (ref 96–112)
CO2 SERPL-SCNC: 29 MMOL/L (ref 20–33)
CREAT SERPL-MCNC: 0.8 MG/DL (ref 0.5–1.4)
ERYTHROCYTE [DISTWIDTH] IN BLOOD BY AUTOMATED COUNT: 45.4 FL (ref 35.9–50)
GFR SERPL CREATININE-BSD FRML MDRD: >60 ML/MIN/1.73 M 2
GLUCOSE SERPL-MCNC: 140 MG/DL (ref 65–99)
HCT VFR BLD AUTO: 38.4 % (ref 37–47)
HGB BLD-MCNC: 13 G/DL (ref 12–16)
MCH RBC QN AUTO: 30.9 PG (ref 27–33)
MCHC RBC AUTO-ENTMCNC: 33.9 G/DL (ref 33.6–35)
MCV RBC AUTO: 91.2 FL (ref 81.4–97.8)
PLATELET # BLD AUTO: 351 K/UL (ref 164–446)
PMV BLD AUTO: 8.9 FL (ref 9–12.9)
POTASSIUM SERPL-SCNC: 4.4 MMOL/L (ref 3.6–5.5)
RBC # BLD AUTO: 4.21 M/UL (ref 4.2–5.4)
SODIUM SERPL-SCNC: 137 MMOL/L (ref 135–145)
WBC # BLD AUTO: 7.1 K/UL (ref 4.8–10.8)

## 2017-02-15 PROCEDURE — 36415 COLL VENOUS BLD VENIPUNCTURE: CPT

## 2017-02-15 PROCEDURE — 80048 BASIC METABOLIC PNL TOTAL CA: CPT

## 2017-02-15 PROCEDURE — 85027 COMPLETE CBC AUTOMATED: CPT

## 2017-02-15 NOTE — OR NURSING
Preadmit appointment:  Patient instructed to continue regularly prescribed medications through day before surgery.  Instructed to take the following medications the  day of surgery with a sip of water per anesthesia protocol: Albuterol inhaler if needed. Breo inhaler, Carvedilol, Pantoprazole, Zofran if needed or compazine, Oxycodone if needed. Pt advised to bring CPAP machine and rescue inhaler day of procedure.  Pt given MAXIMO education sheet

## 2017-02-22 ENCOUNTER — HOSPITAL ENCOUNTER (OUTPATIENT)
Facility: MEDICAL CENTER | Age: 62
End: 2017-02-22
Attending: INTERNAL MEDICINE | Admitting: INTERNAL MEDICINE
Payer: MEDICAID

## 2017-02-22 VITALS
DIASTOLIC BLOOD PRESSURE: 58 MMHG | TEMPERATURE: 98.2 F | HEIGHT: 67 IN | WEIGHT: 192.68 LBS | SYSTOLIC BLOOD PRESSURE: 111 MMHG | BODY MASS INDEX: 30.24 KG/M2 | RESPIRATION RATE: 18 BRPM | OXYGEN SATURATION: 98 % | HEART RATE: 91 BPM

## 2017-02-22 PROBLEM — R10.9 ABDOMINAL PAIN: Status: ACTIVE | Noted: 2017-02-22

## 2017-02-22 LAB
GLUCOSE BLD-MCNC: 123 MG/DL (ref 65–99)
PATHOLOGY CONSULT NOTE: NORMAL

## 2017-02-22 PROCEDURE — 88305 TISSUE EXAM BY PATHOLOGIST: CPT

## 2017-02-22 PROCEDURE — A4606 OXYGEN PROBE USED W OXIMETER: HCPCS | Performed by: INTERNAL MEDICINE

## 2017-02-22 PROCEDURE — 160002 HCHG RECOVERY MINUTES (STAT): Performed by: INTERNAL MEDICINE

## 2017-02-22 PROCEDURE — 160046 HCHG PACU - 1ST 60 MINS PHASE II: Performed by: INTERNAL MEDICINE

## 2017-02-22 PROCEDURE — 700111 HCHG RX REV CODE 636 W/ 250 OVERRIDE (IP)

## 2017-02-22 PROCEDURE — 160202 HCHG ENDO MINUTES - 1ST 30 MINS LEVEL 3: Performed by: INTERNAL MEDICINE

## 2017-02-22 PROCEDURE — 700101 HCHG RX REV CODE 250

## 2017-02-22 PROCEDURE — 160207 HCHG ENDO MINUTES - EA ADDL 1 MIN LEVEL 3: Performed by: INTERNAL MEDICINE

## 2017-02-22 PROCEDURE — 160009 HCHG ANES TIME/MIN: Performed by: INTERNAL MEDICINE

## 2017-02-22 PROCEDURE — 160035 HCHG PACU - 1ST 60 MINS PHASE I: Performed by: INTERNAL MEDICINE

## 2017-02-22 PROCEDURE — 160048 HCHG OR STATISTICAL LEVEL 1-5: Performed by: INTERNAL MEDICINE

## 2017-02-22 PROCEDURE — 160025 RECOVERY II MINUTES (STATS): Performed by: INTERNAL MEDICINE

## 2017-02-22 PROCEDURE — 700111 HCHG RX REV CODE 636 W/ 250 OVERRIDE (IP): Performed by: INTERNAL MEDICINE

## 2017-02-22 PROCEDURE — 502240 HCHG MISC OR SUPPLY RC 0272: Performed by: INTERNAL MEDICINE

## 2017-02-22 PROCEDURE — 82962 GLUCOSE BLOOD TEST: CPT

## 2017-02-22 PROCEDURE — 160036 HCHG PACU - EA ADDL 30 MINS PHASE I: Performed by: INTERNAL MEDICINE

## 2017-02-22 RX ORDER — LIDOCAINE HYDROCHLORIDE 10 MG/ML
INJECTION, SOLUTION INFILTRATION; PERINEURAL
Status: COMPLETED
Start: 2017-02-22 | End: 2017-02-22

## 2017-02-22 RX ORDER — SODIUM CHLORIDE, SODIUM LACTATE, POTASSIUM CHLORIDE, CALCIUM CHLORIDE 600; 310; 30; 20 MG/100ML; MG/100ML; MG/100ML; MG/100ML
INJECTION, SOLUTION INTRAVENOUS
Status: DISCONTINUED | OUTPATIENT
Start: 2017-02-22 | End: 2017-02-22 | Stop reason: HOSPADM

## 2017-02-22 RX ADMIN — LIDOCAINE HYDROCHLORIDE 0.2 ML: 10 INJECTION, SOLUTION INFILTRATION; PERINEURAL at 07:45

## 2017-02-22 RX ADMIN — SODIUM CHLORIDE, POTASSIUM CHLORIDE, SODIUM LACTATE AND CALCIUM CHLORIDE 1000 ML: 600; 310; 30; 20 INJECTION, SOLUTION INTRAVENOUS at 08:05

## 2017-02-22 ASSESSMENT — PAIN SCALES - GENERAL
PAINLEVEL_OUTOF10: ASSUMED PAIN PRESENT
PAINLEVEL_OUTOF10: 0
PAINLEVEL_OUTOF10: 0
PAINLEVEL_OUTOF10: 10
PAINLEVEL_OUTOF10: ASSUMED PAIN PRESENT
PAINLEVEL_OUTOF10: 0

## 2017-02-22 NOTE — PROCEDURES
DATE OF SERVICE:  02/22/2017    FLEXIBLE SIGMOIDOSCOPY/LOWER ENDOSCOPIC ULTRASOUND    PREOPERATIVE DIAGNOSIS:  Squamous cell carcinoma of the anus.    POSTOPERATIVE DIAGNOSIS:  Polypoid tissue at 25 cm from anal verge,   approximately 8 mm in size, removed with saline lift hot snare cautery.    Tortuous colon, especially at the sigmoid location.  No diverticulosis noted   on flexible sigmoidoscopy exam at the splenic flexure approximately 70 cm from   anal verge.  Mucosa in the rectum edematous, but no mass noted.  No mass in   the anal canal appreciated on endoscopy.  Endoscopic ultrasound demonstrated   thickening in the rectal mucosa starting approximately 10 cm from anal verge   distally.  There is no lymphadenopathy appreciated with no gross mass noted.    INSTRUMENT:  Forward-viewing endoscope/radial echoendoscope.    ANESTHESIOLOGIST: Kiko Pena with monitored anesthesia care.    PRE-ANESTHESIA ASSESSMENT:  Prior to procedure, history and physical was   performed and patient medication allergies were reviewed.  Patient's tolerance   to previous anesthesia also reviewed.    CONSENT:  Risk and benefits of procedure and sedation option risks were   discussed with patient including, but not limited to sedation, bleeding,   perforation, missed diagnosis, missed lesions, and inaccurate staging   secondary to previous radiation and chemotherapy.  Patient has understanding   and agreeable to proceed.  After I obtained informed consent from the patient,   the patient was placed in the left lateral position.  Appropriate time-out   protocol was followed including correct patient, correct procedure, correct   equipment and room were confirmed.  Throughout the procedure, patient's blood   pressure and pulse and oxygen saturation were monitored continuously by the   anesthesiologist.    DESCRIPTION OF PROCEDURE:  After appropriate level of sedation, a digital   rectal examination was performed.  Forward-viewing  flexible endoscope was   inserted through the anus and under direct visualization passed to   approximately 60 cm from anal verge.  Splenic shadow was noted.  Retroflexion   was performed.  After completion of flexible sigmoidoscopy and findings   documented below, the scope was withdrawn.  The radial echoendoscope was   inserted through a similar bisection to the iliac vessel.  Multiple PULL THRU   was performed.  Finding and intervention is documented below.    FLEXIBLE SIGMOIDOSCOPY    Scope was advanced to approximately 60 cm from anal verge, splenic shadow was   noted.  There was significant tortuosity at the sigmoid location requiring   manual pressure externally.  Scope was able to get through.  The mucosa from   the sigmoid and distally seems a little bit pale and blanched possibly secondary to   radiation and/or chemotherapy.  At approximately 25 cm from anal verge, there   was an 8-mm polypoid tissue that required saline lift with hot cautery snare.    Tissue was removed in 1 piece completely.  In the rectum, there was mucosal   blanching, but no gross mass noted.  Biopsies were taken to rule out   malignancy.  Also biopsy near the anal verge was also performed.  No gross   tissue was noted in the anus.    RECTAL ENDOSCOPIC ULTRASOUND    Scope was advanced approximately 25 cm from anal verge.  Last vessel was   noted.  Doppler flow confirmed.  Multiple pulsing was performed.  There   appears to be thickening of the wall of the colon in this location up to 0.83   cm; however, there is no gross mass noted.  There appears to be still intact   muscularis propria.  Again, multiple pull through demonstrated no perirectal   lymphadenopathy appreciated at this point.  Slow pull through to the anus   demonstrated thickening of the anus; however, again no gross masses   appreciated.  Sphincter measured approximately 0.57 cm in thickness.    Muscularis propria appears intact throughout.    COMPLICATIONS:   None.    RECOMMENDATIONS:  1.  Await for pathology.  2.  Follow up with Dr. Sue.  3.  If persistent then consider repeat CT scan, possibly external GI cause for   discomfort.       ____________________________________     Arun KIEL West / KAILASH    DD:  02/22/2017 09:06:50  DT:  02/22/2017 11:30:13    D#:  [id number]  Job#:  [id number]

## 2017-02-22 NOTE — OR NURSING
0934 - Report received from LOLA Goldberg.  Assumed care of pt.  Pt resting w eyes closed, RR 20, CPAP in place, no s&s of pain or distress at this time.    0941 - Pt arousable and appropriate.  Denies pain & n/v at this time.    0950 - Report given to LOLA Goldberg

## 2017-02-22 NOTE — OR NURSING
Patient allergies and NPO status verified, home medication reconciliation completed and belongings secured. Patient verbalizes understanding of pain scale, expected course of stay and plan of care. Surgical site verified with patient. Patient and family given home care instructions sheet for discharge planning. IV access established.   Pt states severe lower abdominal pain for 1 week.

## 2017-02-22 NOTE — PROGRESS NOTES
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   Risks, benefits, and alternatives were discussed with consenting person(s). Consenting person(s) were given an opportunity to ask questions and discuss other options. Risks including but not limited to failed or incomplete EUS, ineffective therapy, pancreatitis (with potential future complications), perforation, infection, bleeding, missed lesion(s), missed diagnosis, cardiac and/or pulmonary event, aspiration, stroke, possible need for surgery, hospitalization possibly prolonged, discomfort, unsuccessful and/or incomplete procedure, possible need for repeat procedures and/or additional testings, damage to adjacent organs and/or vascular structures, medication reaction, disability, death, and other adverse events possibly life-threatening. Discussion was undertaken with Layman's terms. Consenting persons stated understanding and acceptance of these risks, and wished to proceed. Consent was given in clear state of mind.

## 2017-02-22 NOTE — IP AVS SNAPSHOT
2/22/2017          Marilyn Cox Strange  [address]  [place] [address]    Dear Marilyn:    [facility] wants to ensure your discharge home is safe and you or your loved ones have had all your questions answered regarding your care after you leave the hospital.    You may receive a telephone call within two days of your discharge.  This call is to make certain you understand your discharge instructions as well as ensure we provided you with the best care possible during your stay with us.     The call will only last approximately 3-5 minutes and will be done by a nurse.    Once again, we want to ensure your discharge home is safe and that you have a clear understanding of any next steps in your care.  If you have any questions or concerns, please do not hesitate to contact us, we are here for you.  Thank you for choosing [facility] for your healthcare needs.    Sincerely,    Mohan Marshall    [facility]

## 2017-02-22 NOTE — DISCHARGE INSTRUCTIONS
COLONOSCOPY OR FLEXIBLE SIGMOIDOSCOPY  1. If you received a barium enema, take a mild laxative such as dulcolax to clean out the barium.   2. Drink plenty of fluids. Eat a diet high in fiber; such foods as whole-grain breads, fresh fruit and vegetables, nuts are recommended.  3. You may notice a few drops of blood with your first bowel movement. If you develop any large amount of bleeding, black stools, a fever, or abdominal pain, call your doctor right away.   4. Call your doctor for test results in *** days.  5. Don't drive or drink alcohol for 24 hours. The medication you received will make you too drowsy.   6. No heavy lifting, ASA products or ASA x 5 days   7. Additional instructions: ***  8. Prescriptions: ***    Discharge time: ***    You should call 911 if you develop problems with breathing or chest pain.    Nurse's Signature: ___________________________________    I acknowledge receipt and understanding of these Home Care instructions.

## 2017-02-22 NOTE — OR NURSING
1027 pt to stage 2, able to move self from gurney to recliner . VSS. Denies nausea or pain. Tolerated PO. Crackers . Family in. DC instr reviewed, all questions answered.  1035 to brm to void, pass flatus.   DC'd via w/c to [facility] in [place] at 1050.

## 2017-02-22 NOTE — OR SURGEON
Immediate Post-Operative Note      PreOp Diagnosis: Squamous Cell Carcinoma of the anus     PostOp Diagnosis: Polyp at the 25cm (approx 8mm), removed with saline lift, hot snare cautery. Tortuous colon cassidy. At sigmoid. No diverticulosis noted. Flexible sigmoidoscopy examined to splenic flexure. Mucosa in the rectum edematous but no mass noted. Bx taken. No mass in the anal canal appreciated.  EUS with thickening of rectal mucosa starting 10cm from anal verge distally. No lymphadenopathy appreciated.     Procedure(s):  SIGMOIDOSCOPY-FLEXIBLE - Wound Class: Clean Contaminated  SIGMOIDOSCOPY W/ENDOSCOPIC US RADIAL AND POSSIBLE LINEAR - Wound Class: Clean Contaminated    Surgeon(s):  Arun Dias M.D.    Anesthesiologist/Type of Anesthesia:  Anesthesiologist: Kiko Pena M.D./NOEMY    Surgical Staff:  Circulator: Julio Darnell R.N.  Endoscopy Technician: Neymar Day    Specimen:   Polyp at 25cm  Rectum    Estimated Blood Loss: None    Findings:   As above    Complications: None    Recommendations:  1. Await pathology  2. Follow-up with Dr. Sue  3. If persistent pain, consider repeat CT scan.     Dictation:[id number]      2/22/2017 8:50 AM Arun Dias

## 2017-02-22 NOTE — IP AVS SNAPSHOT
" Home Care Instructions                                                                                                                Name:Marilyn Cox [place]  Medical Record Number:[id number]  CSN: [id number]    YOB: 1955   Age: 61 y.o.  Sex: female  HT:1.702 m (5' 7\") WT: 87.4 kg (192 lb 10.9 oz)          Admit Date: 2/22/2017     Discharge Date:   Today's Date: 2/22/2017  Attending Doctor:  Arun Dias M.D.                  Allergies:  Review of patient's allergies indicates no known allergies.                Discharge Instructions         COLONOSCOPY OR FLEXIBLE SIGMOIDOSCOPY  1. If you received a barium enema, take a mild laxative such as dulcolax to clean out the barium.   2. Drink plenty of fluids. Eat a diet high in fiber; such foods as whole-grain breads, fresh fruit and vegetables, nuts are recommended.  3. You may notice a few drops of blood with your first bowel movement. If you develop any large amount of bleeding, black stools, a fever, or abdominal pain, call your doctor right away.   4. Call your doctor for test results in *** days.  5. Don't drive or drink alcohol for 24 hours. The medication you received will make you too drowsy.   6. No heavy lifting, ASA products or ASA x 5 days   7. Additional instructions: ***  8. Prescriptions: ***    Discharge time: ***    You should call 911 if you develop problems with breathing or chest pain.    Nurse's Signature: ___________________________________    I acknowledge receipt and understanding of these Home Care instructions.       Medication List      CONTINUE taking these medications        Instructions    albuterol 108 (90 BASE) MCG/ACT Aers inhalation aerosol    Inhale 2 Puffs by mouth every 6 hours as needed for Shortness of Breath (wheezing).   Dose:  2 Puff       aspirin 81 MG Chew chewable tablet   Commonly known as:  ASA    Take 81 mg by mouth every day.   Dose:  81 mg       atorvastatin 20 MG Tabs   Commonly known as:  LIPITOR    Take 20 mg by " mouth every evening.   Dose:  20 mg       BREO ELLIPTA 100-25 MCG/INH Aepb   Generic drug:  Fluticasone Furoate-Vilanterol    Inhale  by mouth every day.       carvedilol 25 MG Tabs   Commonly known as:  COREG    Take 25 mg by mouth 2 times a day.   Dose:  25 mg       enalapril 20 MG tablet   Commonly known as:  VASOTEC    Take 20 mg by mouth 2 times a day.   Dose:  20 mg       metformin 500 MG Tabs   Commonly known as:  GLUCOPHAGE    Take 500 mg by mouth every day.   Dose:  500 mg       ondansetron 4 MG Tabs tablet   Commonly known as:  ZOFRAN    Take 1 Tab by mouth every four hours as needed for Nausea/Vomiting.   Dose:  4 mg       oxycodone immediate-release 5 MG Tabs   Commonly known as:  ROXICODONE    Take 1 Tab by mouth every 8 hours as needed for Severe Pain.   Dose:  5 mg       oyster shell calcium/vitamin D 250-125 MG-UNIT Tabs tablet    Take 1 Tab by mouth 2 times a day.   Dose:  1 Tab       pantoprazole 40 MG Tbec   Commonly known as:  PROTONIX    Take 40 mg by mouth every day.   Dose:  40 mg       prochlorperazine 10 MG Tabs   Commonly known as:  COMPAZINE    Take 1 Tab by mouth every 6 hours as needed for Nausea/Vomiting.   Dose:  10 mg               Medication Information     Above and/or attached are the medications your physician expects you to take upon discharge. Review all of your home medications and newly ordered medications with your doctor and/or pharmacist. Follow medication instructions as directed by your doctor and/or pharmacist. Please keep your medication list with you and share with your physician. Update the information when medications are discontinued, doses are changed, or new medications (including over-the-counter products) are added; and carry medication information at all times in the event of emergency situations.        Resources     Quit Smoking / Tobacco Use:    I understand the use of any tobacco products increases my chance of suffering from future heart disease or stroke  and could cause other illnesses which may shorten my life. Quitting the use of tobacco products is the single most important thing I can do to improve my health. For further information on smoking / tobacco cessation call a Toll Free Quit Line at 1-169.778.9563 (*[employer] [place]) or 1-159.409.6090 (American [employer] Association) or you can access the web based program at www.lungusa.org.    [place] Tobacco Users Help Line:  (288) 897-7080       Toll Free: 1-121.602.4191  Quit Tobacco Program [facility] (659)436-5290    Crisis Hotline:    [place] Crisis Hotline:  8-218-UGKMYJX or 1-286.591.2411    [place] Crisis Hotline:    1-440.506.7611 or 965-118-3912    Discharge Survey:   Thank you for choosing [facility]. We hope we did everything we could to make your hospital stay a pleasant one. You may be receiving a survey and we would appreciate your time and participation in answering the questions. Your input is very valuable to us in our efforts to improve our service to our patients and their families.            Signatures     My signature on this form indicates that:    1. I acknowledge receipt and understanding of these Home Care Instruction.  2. My questions regarding this information have been answered to my satisfaction.  3. I have formulated a plan with my discharge nurse to obtain my prescribed medications for home.    __________________________________      __________________________________                   Patient Signature                                 Guardian/Responsible Adult Signature      __________________________________                 __________       ________                       Nurse Signature                                               Date                 Time

## 2017-03-02 ENCOUNTER — HOSPITAL ENCOUNTER (OUTPATIENT)
Dept: RADIATION ONCOLOGY | Facility: MEDICAL CENTER | Age: 62
End: 2017-03-31
Attending: RADIOLOGY
Payer: MEDICAID

## 2017-03-02 PROCEDURE — 99212 OFFICE O/P EST SF 10 MIN: CPT | Performed by: RADIOLOGY

## 2017-03-02 PROCEDURE — 99214 OFFICE O/P EST MOD 30 MIN: CPT | Performed by: RADIOLOGY

## 2017-03-03 PROBLEM — C21.0: Status: ACTIVE | Noted: 2017-03-03

## 2017-03-03 NOTE — PROCEDURES
DATE OF SERVICE:  03/02/2017    DIAGNOSIS:  Stage IIIB (T2N3M0) squamous cell carcinoma of the anal canal with   bilateral inguinal and pelvic adenopathy, treated with definitive   chemoradiotherapy, completing in August 2016 to a total dose of 5580 cGy.    INTERVAL HISTORY:  I had the pleasure of seeing the patient back today in   followup for her anal cancer.  She is now roughly 6 months out from completion   of therapy and has had recent restaging scans and colonoscopy.  At the time   of her colonoscopy, a colon polyp was found roughly 25 cm from the anal verge,   which was resected and found to be benign and the area of previous tumor was   biopsied and shows no evidence of any residual disease.  Her CT scan shows   complete radiographic response to the inguinal and pelvic disease as well as   primary.  Therefore, she is currently without any evidence of disease.  She   does, however, have some residual effects, likely related to her treatment,   most notably a lower abdominal cramping or discomfort, for which she takes   oxycodone, she takes medicine 3-4 times per day.  She also has fatigue;   however, was appreciated on our visit that she continues to have a rapid heart   rate, she has had this previously in the past and is followed by cardiology   for it.  Currently in our office, her pulse on 2 checks is greater than 120.    She does not have an upcoming appointment with cardiology, but we discussed   with her calling cardiology to see if they need to rework up this tachycardia.    PHYSICAL EXAMINATION:  VITAL SIGNS:  Patient's weight is 192 pounds, temperature 98.1, pulse of 134,   blood pressure 159/76, oxygen saturation of 96% on room oxygen.  Pain scale   0/10, but intermittently has notable pain and discomfort in the lower   abdominal quadrant.  Performance status, ECOG 1.  EYES:  Exam reveals equally reactive pupils.  HEENT:  Reveals no lesions in the oral cavity or oropharynx.  NECK:  Reveals no  cervical or supraclavicular lymphadenopathy.  LUNGS:  Clear to auscultation bilaterally.  HEART:  Normal S1, S2.  ABDOMEN:  Soft and nontender.  MUSCULOSKELETAL:  Reveals no acute areas of bony tenderness or deformity.  NEUROLOGIC:  Grossly intact.  RECTAL:  Reveals no palpable areas of disease.  LYMPHATIC:  Reveals no palpable inguinal lymphadenopathy.    ASSESSMENT:  No evidence of disease.    PLAN:  Discussed with the patient and her sister her findings over a oughly   35-minute time period, 95% of that time dedicated to an ongoing survivorship   plan.  We discussed with the patient, we are quite pleased that she shows no   evidence of disease.  We did discuss that the healing process after   significant chemoradiotherapy to the pelvis can be a very slow process after   anal cancer treatments.  Our hope is that with time, she will continue to only   improve.  In the interim, however, we discussed her contacting cardiology to   let them know that her tachycardia, which currently has regular rate and   rhythm is present again and causing her possibly fatigue.  Otherwise, she is   planning to see Dr. Waite in 3 months, I would then plan on seeing her in 6   months.  Should she have any questions or concerns before that time, she   should feel free to contact me.       ____________________________________     Jose Raul Magdaleno MD    [facility]    DD:  03/02/2017 11:18:45  DT:  03/03/2017 06:12:42    D#:  [id number]  Job#:  [id number]    cc: Kacy Powell MD, Calra ROMERO, LEILA WAITE MD, Arun Dias M.D.

## 2017-03-06 ENCOUNTER — OUTPATIENT INFUSION SERVICES (OUTPATIENT)
Dept: ONCOLOGY | Facility: MEDICAL CENTER | Age: 62
End: 2017-03-06
Attending: INTERNAL MEDICINE
Payer: MEDICAID

## 2017-03-06 VITALS
OXYGEN SATURATION: 94 % | HEART RATE: 69 BPM | RESPIRATION RATE: 18 BRPM | SYSTOLIC BLOOD PRESSURE: 98 MMHG | HEIGHT: 68 IN | DIASTOLIC BLOOD PRESSURE: 49 MMHG | BODY MASS INDEX: 28.4 KG/M2 | TEMPERATURE: 97 F | WEIGHT: 187.39 LBS

## 2017-03-06 DIAGNOSIS — C21.0 MALIGNANT NEOPLASM OF ANUS, UNSPECIFIED SITE: ICD-10-CM

## 2017-03-06 PROCEDURE — A4212 NON CORING NEEDLE OR STYLET: HCPCS

## 2017-03-06 PROCEDURE — 700101 HCHG RX REV CODE 250

## 2017-03-06 PROCEDURE — 700111 HCHG RX REV CODE 636 W/ 250 OVERRIDE (IP)

## 2017-03-06 PROCEDURE — 96523 IRRIG DRUG DELIVERY DEVICE: CPT

## 2017-03-06 RX ORDER — LIDOCAINE HYDROCHLORIDE 10 MG/ML
INJECTION, SOLUTION INFILTRATION; PERINEURAL
Status: DISCONTINUED
Start: 2017-03-06 | End: 2017-03-06

## 2017-03-06 RX ORDER — LIDOCAINE HYDROCHLORIDE 10 MG/ML
INJECTION, SOLUTION INFILTRATION; PERINEURAL
Status: COMPLETED
Start: 2017-03-06 | End: 2017-03-06

## 2017-03-06 RX ADMIN — HEPARIN: 100 SYRINGE at 12:00

## 2017-03-06 RX ADMIN — LIDOCAINE HYDROCHLORIDE: 10 INJECTION, SOLUTION INFILTRATION; PERINEURAL at 11:50

## 2017-03-06 ASSESSMENT — PAIN SCALES - GENERAL: PAINLEVEL: NO PAIN

## 2017-03-06 NOTE — PROGRESS NOTES
Patient arrived to Infusion for Port Flush; pt states no changes since last appt.  Pt applied emla creme, lidocaine injected as well.  Port accessed in sterile field; flushed well with brisk blood return noted.  Port de-accessed; gauze dressing applied. Pt made next port flush appt upon discharge with .  Pt left in great spirits with no apparent distress.

## 2017-03-06 NOTE — ADDENDUM NOTE
Encounter addended by: Mine Brooks, PHARMD on: 3/6/2017 11:57 AM<BR>     Documentation filed: Rx Order Verification

## 2017-03-06 NOTE — ADDENDUM NOTE
Encounter addended by: Donaldo Díaz, PHARMD on: 3/6/2017 12:05 PM<BR>     Documentation filed: Orders

## 2017-03-10 ENCOUNTER — HOSPITAL ENCOUNTER (OUTPATIENT)
Dept: RADIOLOGY | Facility: MEDICAL CENTER | Age: 62
End: 2017-03-10
Attending: INTERNAL MEDICINE
Payer: MEDICAID

## 2017-03-10 DIAGNOSIS — K59.00 UNSPECIFIED CONSTIPATION: ICD-10-CM

## 2017-03-10 DIAGNOSIS — R10.9 ABDOMINAL PAIN, UNSPECIFIED SITE: ICD-10-CM

## 2017-03-10 DIAGNOSIS — R93.89 MEDIASTINAL SHIFT: ICD-10-CM

## 2017-03-10 DIAGNOSIS — C80.1 NEOPLASM/CANCER (HCC): ICD-10-CM

## 2017-03-10 PROCEDURE — 74000 DX-ABDOMEN-1 VIEW: CPT

## 2017-03-10 PROCEDURE — 76700 US EXAM ABDOM COMPLETE: CPT

## 2017-04-03 ENCOUNTER — APPOINTMENT (OUTPATIENT)
Dept: ONCOLOGY | Facility: MEDICAL CENTER | Age: 62
End: 2017-04-03
Attending: INTERNAL MEDICINE
Payer: MEDICAID

## 2017-04-07 ENCOUNTER — OUTPATIENT INFUSION SERVICES (OUTPATIENT)
Dept: ONCOLOGY | Facility: MEDICAL CENTER | Age: 62
End: 2017-04-07
Attending: INTERNAL MEDICINE
Payer: MEDICAID

## 2017-04-07 VITALS
SYSTOLIC BLOOD PRESSURE: 146 MMHG | DIASTOLIC BLOOD PRESSURE: 73 MMHG | HEIGHT: 68 IN | RESPIRATION RATE: 18 BRPM | TEMPERATURE: 98.8 F | BODY MASS INDEX: 28.9 KG/M2 | WEIGHT: 190.7 LBS | OXYGEN SATURATION: 100 % | HEART RATE: 80 BPM

## 2017-04-07 DIAGNOSIS — C21.0 MALIGNANT NEOPLASM OF ANUS, UNSPECIFIED SITE: ICD-10-CM

## 2017-04-07 LAB
ALBUMIN SERPL BCP-MCNC: 3.8 G/DL (ref 3.2–4.9)
ALBUMIN/GLOB SERPL: 1 G/DL
ALP SERPL-CCNC: 99 U/L (ref 30–99)
ALT SERPL-CCNC: 10 U/L (ref 2–50)
ANION GAP SERPL CALC-SCNC: 6 MMOL/L (ref 0–11.9)
AST SERPL-CCNC: 15 U/L (ref 12–45)
BASOPHILS # BLD AUTO: 0.4 % (ref 0–1.8)
BASOPHILS # BLD: 0.02 K/UL (ref 0–0.12)
BILIRUB SERPL-MCNC: 0.7 MG/DL (ref 0.1–1.5)
BUN SERPL-MCNC: 12 MG/DL (ref 8–22)
CALCIUM SERPL-MCNC: 9.5 MG/DL (ref 8.5–10.5)
CHLORIDE SERPL-SCNC: 105 MMOL/L (ref 96–112)
CO2 SERPL-SCNC: 27 MMOL/L (ref 20–33)
CREAT SERPL-MCNC: 0.73 MG/DL (ref 0.5–1.4)
EOSINOPHIL # BLD AUTO: 0.05 K/UL (ref 0–0.51)
EOSINOPHIL NFR BLD: 1 % (ref 0–6.9)
ERYTHROCYTE [DISTWIDTH] IN BLOOD BY AUTOMATED COUNT: 50.4 FL (ref 35.9–50)
GFR SERPL CREATININE-BSD FRML MDRD: >60 ML/MIN/1.73 M 2
GLOBULIN SER CALC-MCNC: 3.9 G/DL (ref 1.9–3.5)
GLUCOSE SERPL-MCNC: 111 MG/DL (ref 65–99)
HCT VFR BLD AUTO: 34.3 % (ref 37–47)
HGB BLD-MCNC: 11.3 G/DL (ref 12–16)
IMM GRANULOCYTES # BLD AUTO: 0.01 K/UL (ref 0–0.11)
IMM GRANULOCYTES NFR BLD AUTO: 0.2 % (ref 0–0.9)
LYMPHOCYTES # BLD AUTO: 0.92 K/UL (ref 1–4.8)
LYMPHOCYTES NFR BLD: 17.8 % (ref 22–41)
MAGNESIUM SERPL-MCNC: 1.7 MG/DL (ref 1.5–2.5)
MCH RBC QN AUTO: 30.3 PG (ref 27–33)
MCHC RBC AUTO-ENTMCNC: 32.9 G/DL (ref 33.6–35)
MCV RBC AUTO: 92 FL (ref 81.4–97.8)
MONOCYTES # BLD AUTO: 0.67 K/UL (ref 0–0.85)
MONOCYTES NFR BLD AUTO: 12.9 % (ref 0–13.4)
NEUTROPHILS # BLD AUTO: 3.51 K/UL (ref 2–7.15)
NEUTROPHILS NFR BLD: 67.7 % (ref 44–72)
NRBC # BLD AUTO: 0 K/UL
NRBC BLD AUTO-RTO: 0 /100 WBC
PLATELET # BLD AUTO: 308 K/UL (ref 164–446)
PMV BLD AUTO: 9.1 FL (ref 9–12.9)
POTASSIUM SERPL-SCNC: 4.2 MMOL/L (ref 3.6–5.5)
PROT SERPL-MCNC: 7.7 G/DL (ref 6–8.2)
RBC # BLD AUTO: 3.73 M/UL (ref 4.2–5.4)
SODIUM SERPL-SCNC: 138 MMOL/L (ref 135–145)
WBC # BLD AUTO: 5.2 K/UL (ref 4.8–10.8)

## 2017-04-07 PROCEDURE — 85025 COMPLETE CBC W/AUTO DIFF WBC: CPT

## 2017-04-07 PROCEDURE — 700101 HCHG RX REV CODE 250

## 2017-04-07 PROCEDURE — A4212 NON CORING NEEDLE OR STYLET: HCPCS

## 2017-04-07 PROCEDURE — 700111 HCHG RX REV CODE 636 W/ 250 OVERRIDE (IP)

## 2017-04-07 PROCEDURE — 36591 DRAW BLOOD OFF VENOUS DEVICE: CPT

## 2017-04-07 PROCEDURE — 80053 COMPREHEN METABOLIC PANEL: CPT

## 2017-04-07 PROCEDURE — 83735 ASSAY OF MAGNESIUM: CPT

## 2017-04-07 RX ORDER — LIDOCAINE HYDROCHLORIDE 10 MG/ML
INJECTION, SOLUTION INFILTRATION; PERINEURAL
Status: COMPLETED
Start: 2017-04-07 | End: 2017-04-07

## 2017-04-07 RX ADMIN — HEPARIN 500 UNITS: 100 SYRINGE at 10:00

## 2017-04-07 RX ADMIN — LIDOCAINE HYDROCHLORIDE 0.2 ML: 10 INJECTION, SOLUTION INFILTRATION; PERINEURAL at 09:49

## 2017-04-07 ASSESSMENT — PAIN SCALES - GENERAL: PAINLEVEL: 2=MINIMAL-SLIGHT

## 2017-04-07 NOTE — PROGRESS NOTES
Pt presented to infusion center for labs and port flush. Right chest port in place, removed EMLA cream that pt had applied at home. Lidocaine injected intradermally at port site per pt request. Port accessed in sterile fashion, brisk blood return observed. Labs drawn per orders and sent. Port flushed per protocol and heparinized, de-accessed with needle intact, gauze dressing placed. Pt has next appt, left on foot in good spirits.

## 2017-04-28 ENCOUNTER — HOSPITAL ENCOUNTER (INPATIENT)
Facility: MEDICAL CENTER | Age: 62
LOS: 7 days | DRG: 948 | End: 2017-05-06
Attending: EMERGENCY MEDICINE | Admitting: HOSPITALIST
Payer: MEDICAID

## 2017-04-28 ENCOUNTER — APPOINTMENT (OUTPATIENT)
Dept: RADIOLOGY | Facility: MEDICAL CENTER | Age: 62
DRG: 948 | End: 2017-04-28
Attending: EMERGENCY MEDICINE
Payer: MEDICAID

## 2017-04-28 DIAGNOSIS — R19.00 ABDOMINAL MASS, UNSPECIFIED LOCATION: ICD-10-CM

## 2017-04-28 DIAGNOSIS — R10.9 INTRACTABLE ABDOMINAL PAIN: Primary | ICD-10-CM

## 2017-04-28 DIAGNOSIS — C79.9 METASTATIC CANCER (HCC): ICD-10-CM

## 2017-04-28 DIAGNOSIS — C21.0 MALIGNANT NEOPLASM OF ANUS, UNSPECIFIED SITE: ICD-10-CM

## 2017-04-28 LAB
ALBUMIN SERPL BCP-MCNC: 3.7 G/DL (ref 3.2–4.9)
ALBUMIN/GLOB SERPL: 0.9 G/DL
ALP SERPL-CCNC: 108 U/L (ref 30–99)
ALT SERPL-CCNC: 16 U/L (ref 2–50)
ANION GAP SERPL CALC-SCNC: 10 MMOL/L (ref 0–11.9)
APTT PPP: 43.2 SEC (ref 24.7–36)
AST SERPL-CCNC: 19 U/L (ref 12–45)
BASOPHILS # BLD AUTO: 0.3 % (ref 0–1.8)
BASOPHILS # BLD: 0.03 K/UL (ref 0–0.12)
BILIRUB SERPL-MCNC: 0.9 MG/DL (ref 0.1–1.5)
BUN SERPL-MCNC: 9 MG/DL (ref 8–22)
CALCIUM SERPL-MCNC: 8.9 MG/DL (ref 8.5–10.5)
CHLORIDE SERPL-SCNC: 98 MMOL/L (ref 96–112)
CO2 SERPL-SCNC: 24 MMOL/L (ref 20–33)
CREAT SERPL-MCNC: 0.72 MG/DL (ref 0.5–1.4)
EOSINOPHIL # BLD AUTO: 0.03 K/UL (ref 0–0.51)
EOSINOPHIL NFR BLD: 0.3 % (ref 0–6.9)
ERYTHROCYTE [DISTWIDTH] IN BLOOD BY AUTOMATED COUNT: 47 FL (ref 35.9–50)
GFR SERPL CREATININE-BSD FRML MDRD: >60 ML/MIN/1.73 M 2
GLOBULIN SER CALC-MCNC: 3.9 G/DL (ref 1.9–3.5)
GLUCOSE SERPL-MCNC: 134 MG/DL (ref 65–99)
HCT VFR BLD AUTO: 33.3 % (ref 37–47)
HGB BLD-MCNC: 11 G/DL (ref 12–16)
IMM GRANULOCYTES # BLD AUTO: 0.03 K/UL (ref 0–0.11)
IMM GRANULOCYTES NFR BLD AUTO: 0.3 % (ref 0–0.9)
INR PPP: 1.15 (ref 0.87–1.13)
LIPASE SERPL-CCNC: 22 U/L (ref 11–82)
LYMPHOCYTES # BLD AUTO: 0.67 K/UL (ref 1–4.8)
LYMPHOCYTES NFR BLD: 7 % (ref 22–41)
MCH RBC QN AUTO: 29.8 PG (ref 27–33)
MCHC RBC AUTO-ENTMCNC: 33 G/DL (ref 33.6–35)
MCV RBC AUTO: 90.2 FL (ref 81.4–97.8)
MONOCYTES # BLD AUTO: 0.8 K/UL (ref 0–0.85)
MONOCYTES NFR BLD AUTO: 8.3 % (ref 0–13.4)
NEUTROPHILS # BLD AUTO: 8.08 K/UL (ref 2–7.15)
NEUTROPHILS NFR BLD: 83.8 % (ref 44–72)
NRBC # BLD AUTO: 0 K/UL
NRBC BLD AUTO-RTO: 0 /100 WBC
PLATELET # BLD AUTO: 372 K/UL (ref 164–446)
PMV BLD AUTO: 8.9 FL (ref 9–12.9)
POTASSIUM SERPL-SCNC: 4 MMOL/L (ref 3.6–5.5)
PROT SERPL-MCNC: 7.6 G/DL (ref 6–8.2)
PROTHROMBIN TIME: 15.1 SEC (ref 12–14.6)
RBC # BLD AUTO: 3.69 M/UL (ref 4.2–5.4)
SODIUM SERPL-SCNC: 132 MMOL/L (ref 135–145)
WBC # BLD AUTO: 9.6 K/UL (ref 4.8–10.8)

## 2017-04-28 PROCEDURE — 80053 COMPREHEN METABOLIC PANEL: CPT

## 2017-04-28 PROCEDURE — 74177 CT ABD & PELVIS W/CONTRAST: CPT

## 2017-04-28 PROCEDURE — 96376 TX/PRO/DX INJ SAME DRUG ADON: CPT

## 2017-04-28 PROCEDURE — 700117 HCHG RX CONTRAST REV CODE 255: Performed by: EMERGENCY MEDICINE

## 2017-04-28 PROCEDURE — 99285 EMERGENCY DEPT VISIT HI MDM: CPT

## 2017-04-28 PROCEDURE — 700105 HCHG RX REV CODE 258: Performed by: EMERGENCY MEDICINE

## 2017-04-28 PROCEDURE — 96361 HYDRATE IV INFUSION ADD-ON: CPT

## 2017-04-28 PROCEDURE — 85730 THROMBOPLASTIN TIME PARTIAL: CPT

## 2017-04-28 PROCEDURE — 36415 COLL VENOUS BLD VENIPUNCTURE: CPT

## 2017-04-28 PROCEDURE — 85610 PROTHROMBIN TIME: CPT

## 2017-04-28 PROCEDURE — 83690 ASSAY OF LIPASE: CPT

## 2017-04-28 PROCEDURE — 96374 THER/PROPH/DIAG INJ IV PUSH: CPT

## 2017-04-28 PROCEDURE — 85025 COMPLETE CBC W/AUTO DIFF WBC: CPT

## 2017-04-28 PROCEDURE — 96375 TX/PRO/DX INJ NEW DRUG ADDON: CPT

## 2017-04-28 PROCEDURE — 700101 HCHG RX REV CODE 250: Performed by: EMERGENCY MEDICINE

## 2017-04-28 PROCEDURE — 700111 HCHG RX REV CODE 636 W/ 250 OVERRIDE (IP): Performed by: EMERGENCY MEDICINE

## 2017-04-28 RX ORDER — ONDANSETRON 2 MG/ML
4 INJECTION INTRAMUSCULAR; INTRAVENOUS ONCE
Status: COMPLETED | OUTPATIENT
Start: 2017-04-28 | End: 2017-04-28

## 2017-04-28 RX ORDER — LIDOCAINE AND PRILOCAINE 25; 25 MG/G; MG/G
CREAM TOPICAL ONCE
Status: COMPLETED | OUTPATIENT
Start: 2017-04-28 | End: 2017-04-28

## 2017-04-28 RX ORDER — SODIUM CHLORIDE 9 MG/ML
INJECTION, SOLUTION INTRAVENOUS CONTINUOUS
Status: DISCONTINUED | OUTPATIENT
Start: 2017-04-28 | End: 2017-04-29

## 2017-04-28 RX ADMIN — HYDROMORPHONE HYDROCHLORIDE 1 MG: 1 INJECTION, SOLUTION INTRAMUSCULAR; INTRAVENOUS; SUBCUTANEOUS at 23:45

## 2017-04-28 RX ADMIN — HYDROMORPHONE HYDROCHLORIDE 1 MG: 1 INJECTION, SOLUTION INTRAMUSCULAR; INTRAVENOUS; SUBCUTANEOUS at 21:26

## 2017-04-28 RX ADMIN — ONDANSETRON 4 MG: 2 INJECTION INTRAMUSCULAR; INTRAVENOUS at 22:46

## 2017-04-28 RX ADMIN — ONDANSETRON 4 MG: 2 INJECTION INTRAMUSCULAR; INTRAVENOUS at 21:26

## 2017-04-28 RX ADMIN — HYDROMORPHONE HYDROCHLORIDE 1 MG: 1 INJECTION, SOLUTION INTRAMUSCULAR; INTRAVENOUS; SUBCUTANEOUS at 22:46

## 2017-04-28 RX ADMIN — IOHEXOL 100 ML: 350 INJECTION, SOLUTION INTRAVENOUS at 22:41

## 2017-04-28 RX ADMIN — SODIUM CHLORIDE: 9 INJECTION, SOLUTION INTRAVENOUS at 21:29

## 2017-04-28 RX ADMIN — LIDOCAINE AND PRILOCAINE 5 CM: 25; 25 CREAM TOPICAL at 21:15

## 2017-04-28 ASSESSMENT — PAIN SCALES - GENERAL
PAINLEVEL_OUTOF10: 10
PAINLEVEL_OUTOF10: 6
PAINLEVEL_OUTOF10: 10

## 2017-04-28 ASSESSMENT — LIFESTYLE VARIABLES: DO YOU DRINK ALCOHOL: NO

## 2017-04-28 NOTE — IP AVS SNAPSHOT
" Home Care Instructions                                                                                                                  Name:Marilyn Strange  Medical Record Number:[id number]  CSN: [id number]    YOB: 1955   Age: 61 y.o.  Sex: female  HT:1.702 m (5' 7\") WT: 88.451 kg (195 lb)          Admit Date: 4/28/2017     Discharge Date:   Today's Date: 5/6/2017  Attending Doctor:  Robert Calvillo M.D.                  Allergies:  Review of patient's allergies indicates no known allergies.            Discharge Instructions       Discharge Instructions    Discharged to home by car with relative. Discharged via wheelchair, hospital escort: Yes.  Special equipment needed: Not Applicable    Be sure to schedule a follow-up appointment with your primary care doctor or any specialists as instructed.     Discharge Plan:   Influenza Vaccine Indication: Patient Refuses    I understand that a diet low in cholesterol, fat, and sodium is recommended for good health. Unless I have been given specific instructions below for another diet, I accept this instruction as my diet prescription.   Other diet: diabetic    Special Instructions: None    · Is patient discharged on Warfarin / Coumadin?   No     · Is patient Post Blood Transfusion?  No    Depression / Suicide Risk    As you are discharged from this [facility] facility, it is important to learn how to keep safe from harming yourself.    Recognize the warning signs:  · Abrupt changes in personality, positive or negative- including increase in energy   · Giving away possessions  · Change in eating patterns- significant weight changes-  positive or negative  · Change in sleeping patterns- unable to sleep or sleeping all the time   · Unwillingness or inability to communicate  · Depression  · Unusual sadness, discouragement and loneliness  · Talk of wanting to die  · Neglect of personal appearance   · Rebelliousness- reckless behavior  · Withdrawal from people/activities " they love  · Confusion- inability to concentrate     If you or a loved one observes any of these behaviors or has concerns about self-harm, here's what you can do:  · Talk about it- your feelings and reasons for harming yourself  · Remove any means that you might use to hurt yourself (examples: pills, rope, extension cords, firearm)  · Get professional help from the community (Mental Health, Substance Abuse, psychological counseling)  · Do not be alone:Call your Safe Contact- someone whom you trust who will be there for you.  · Call your local CRISIS HOTLINE 741-6991 or 240-369-1115  · Call your local Children's Mobile Crisis Response Team [place] (454) 532-9795 or www.Voxa  · Call the toll free National Suicide Prevention Hotlines   · National Suicide Prevention Lifeline 148-303-MDEX (3971)  · SABIA Line Network 800-SUICIDE (152-7535)        Your appointments     Sep 05, 2017 10:00 AM   Follow Up with Jose Raul Magdaleno M.D.   [facility] Radiation Therapy (--)    [address] [address] [phone]              Follow-up Information     1. Follow up with Alfa Sue M.D.. Call in 2 days.    Specialty:  Oncology    Why:  for follow up    Contact information    [address] [facility] [address]  [facility] [address] [phone]           Discharge Medication Instructions:    Below are the medications your physician expects you to take upon discharge:    Review all your home medications and newly ordered medications with your doctor and/or pharmacist. Follow medication instructions as directed by your doctor and/or pharmacist.    Please keep your medication list with you and share with your physician.               Medication List      START taking these medications        Instructions    Morning Afternoon Evening Bedtime    morphine ER 15 MG Tbcr tablet   Last time this was given:  15 mg on 5/6/2017  8:25 AM   Commonly known as:  MS CONTIN        Take 1 Tab by mouth 3 times a day.   Dose:   15 mg                          CONTINUE taking these medications        Instructions    Morning Afternoon Evening Bedtime    aspirin 81 MG Chew chewable tablet   Last time this was given:  81 mg on 5/6/2017  8:25 AM   Commonly known as:  ASA   Next Dose Due:  Tomorrow am          Take 81 mg by mouth every day.   Dose:  81 mg                        atorvastatin 20 MG Tabs   Last time this was given:  20 mg on 5/5/2017  8:40 PM   Commonly known as:  LIPITOR   Next Dose Due:  Tonight          Take 20 mg by mouth every evening.   Dose:  20 mg                        BREO ELLIPTA 100-25 MCG/INH Aepb   Generic drug:  Fluticasone Furoate-Vilanterol   Next Dose Due:  As needed          Inhale 1 Puff by mouth every day.   Dose:  1 Puff                        carvedilol 25 MG Tabs   Last time this was given:  25 mg on 5/6/2017  8:25 AM   Commonly known as:  COREG   Next Dose Due:  Tonight around 8pm          Take 25 mg by mouth 2 times a day.   Dose:  25 mg                        enalapril 20 MG tablet   Last time this was given:  20 mg on 5/5/2017  8:46 PM   Commonly known as:  VASOTEC   Next Dose Due:  Tonight          Take 20 mg by mouth 2 times a day.   Dose:  20 mg                        metformin 500 MG Tabs   Commonly known as:  GLUCOPHAGE   Next Dose Due:  Daily          Take 500 mg by mouth every day.   Dose:  500 mg                        oxycodone immediate-release 5 MG Tabs   Last time this was given:  5 mg on 5/6/2017 11:57 AM   Commonly known as:  ROXICODONE   Next Dose Due:  Around 8pm if needed        Take 1 Tab by mouth every 8 hours as needed for Severe Pain.   Dose:  5 mg                        oyster shell calcium/vitamin D 250-125 MG-UNIT Tabs tablet   Next Dose Due:  Morning and night          Take 1 Tab by mouth 2 times a day.   Dose:  1 Tab                        pantoprazole 40 MG Tbec   Commonly known as:  PROTONIX   Next Dose Due:  Daily          Take 40 mg by mouth every day.   Dose:  40 mg                         polyethylene glycol 3350 Powd   Commonly known as:  MIRALAX   Next Dose Due:  Tomorrow          Take 17 g by mouth every day.   Dose:  17 g                             Where to Get Your Medications      Information about where to get these medications is not yet available     ! Ask your nurse or doctor about these medications    - morphine ER 15 MG Tbcr tablet            Instructions           Diet / Nutrition:    Follow any diet instructions given to you by your doctor or the dietician, including how much salt (sodium) you are allowed each day.    If you are overweight, talk to your doctor about a weight reduction plan.    Activity:    Remain physically active following your doctor's instructions about exercise and activity.    Rest often.     Any time you become even a little tired or short of breath, SIT DOWN and rest.    Worsening Symptoms:    Report any of the following signs and symptoms to the doctor's office immediately:    *Pain of jaw, arm, or neck  *Chest pain not relieved by medication                               *Dizziness or loss of consciousness  *Difficulty breathing even when at rest   *More tired than usual                                       *Bleeding drainage or swelling of surgical site  *Swelling of feet, ankles, legs or stomach                 *Fever (>100ºF)  *Pink or blood tinged sputum  *Weight gain (3lbs/day or 5lbs /week)           *Shock from internal defibrillator (if applicable)  *Palpitations or irregular heartbeats                *Cool and/or numb extremities    Stroke Awareness    Common Risk Factors for Stroke include:    Age  Atrial Fibrillation  Carotid Artery Stenosis  Diabetes Mellitus  Excessive alcohol consumption  High blood pressure  Overweight   Physical inactivity  Smoking    Warning signs and symptoms of a stroke include:    *Sudden numbness or weakness of the face, arm or leg (especially on one side of the body).  *Sudden confusion, trouble speaking or  understanding.  *Sudden trouble seeing in one or both eyes.  *Sudden trouble walking, dizziness, loss of balance or coordination.Sudden severe headache with no known cause.    It is very important to get treatment quickly when a stroke occurs. If you experience any of the above warning signs, call 911 immediately.                   Disclaimer         Quit Smoking / Tobacco Use:    I understand the use of any tobacco products increases my chance of suffering from future heart disease or stroke and could cause other illnesses which may shorten my life. Quitting the use of tobacco products is the single most important thing I can do to improve my health. For further information on smoking / tobacco cessation call a Toll Free Quit Line at 1-911.988.6413 (*National Cancer [place]) or 1-144.831.8094 (American Lung Association) or you can access the web based program at www.lungusa.org.    [place] Tobacco Users Help Line:  (682) 709-1183       Toll Free: 1-182.407.1388  Quit Tobacco Program [facility] (909)570-8040    Crisis Hotline:    [place] Crisis Hotline:  0-736-OAQIFUC or 1-205.118.1183    [place] Crisis Hotline:    1-226.337.3348 or 918-910-0680    Discharge Survey:   Thank you for choosing [facility]. We hope we did everything we could to make your hospital stay a pleasant one. You may be receiving a phone survey and we would appreciate your time and participation in answering the questions. Your input is very valuable to us in our efforts to improve our service to our patients and their families.        My signature on this form indicates that:    1. I have reviewed and understand the above information.  2. My questions regarding this information have been answered to my satisfaction.  3. I have formulated a plan with my discharge nurse to obtain my prescribed medications for home.                  Disclaimer         __________________________________                     __________        ________                       Patient Signature                                                 Date                    Time

## 2017-04-28 NOTE — IP AVS SNAPSHOT
5/6/2017    Marilyn Brooke Strange  [address]  [place] [address]    Dear Marilyn:    [facility] wants to ensure your discharge home is safe and you or your loved ones have had all of your questions answered regarding your care after you leave the hospital.    Below is a list of resources and contact information should you have any questions regarding your hospital stay, follow-up instructions, or active medical symptoms.    Questions or Concerns Regarding… Contact   Medical Questions Related to Your Discharge  (7 days a week, 8am-5pm) Contact a Nurse Care Coordinator   [phone]   Medical Questions Not Related to Your Discharge  (24 hours a day / 7 days a week)  Contact the Nurse Health Line   [phone]    Medications or Discharge Instructions Refer to your discharge packet   or contact your [facility] Primary Care Provider   [phone]   Follow-up Appointment(s) Schedule your appointment via [employer]   or contact Scheduling [phone]   Billing Review your statement via [employer]  or contact Billing [phone]   Medical Records Review your records via [employer]   or contact Medical Records [phone]     You may receive a telephone call within two days of discharge. This call is to make certain you understand your discharge instructions and have the opportunity to have any questions answered. You can also easily access your medical information, test results and upcoming appointments via the [employer] free online health management tool. You can learn more and sign up at [employer]. For assistance setting up your [employer] account, please call [phone].    Once again, we want to ensure your discharge home is safe and that you have a clear understanding of any next steps in your care. If you have any questions or concerns, please do not hesitate to contact us, we are here for you. Thank you for choosing [facility] for your healthcare needs.    Sincerely,    Your [facility] Healthcare Team

## 2017-04-28 NOTE — IP AVS SNAPSHOT
Verient Access Code: [email or web address]  Expires: 6/5/2017  3:08 PM    Verient  A secure, online tool to manage your health information     [employer]’s Verient® is a secure, online tool that connects you to your personalized health information from the privacy of your home -- day or night - making it very easy for you to manage your healthcare. Once the activation process is completed, you can even access your medical information using the Verient isidoro, which is available for free in the Apple Isidoro store or Google Play store.     Verient provides the following levels of access (as shown below):   My Chart Features   [facility] Primary Care Doctor [facility]  Specialists [facility]  Urgent  Care Non-[facility]  Primary Care  Doctor   Email your healthcare team securely and privately 24/7 X X X X   Manage appointments: schedule your next appointment; view details of past/upcoming appointments X      Request prescription refills. X      View recent personal medical records, including lab and immunizations X X X X   View health record, including health history, allergies, medications X X X X   Read reports about your outpatient visits, procedures, consult and ER notes X X X X   See your discharge summary, which is a recap of your hospital and/or ER visit that includes your diagnosis, lab results, and care plan. X X       How to register for Verient:  1. Go to  [email or web address].  2. Click on the Sign Up Now box, which takes you to the New Member Sign Up page. You will need to provide the following information:  a. Enter your Verient Access Code exactly as it appears at the top of this page. (You will not need to use this code after you’ve completed the sign-up process. If you do not sign up before the expiration date, you must request a new code.)   b. Enter your date of birth.   c. Enter your home email address.   d. Click Submit, and follow the next screen’s instructions.  3. Create a Verient ID. This will be your Verient  login ID and cannot be changed, so think of one that is secure and easy to remember.  4. Create a [employer] password. You can change your password at any time.  5. Enter your Password Reset Question and Answer. This can be used at a later time if you forget your password.   6. Enter your e-mail address. This allows you to receive e-mail notifications when new information is available in [employer].  7. Click Sign Up. You can now view your health information.    For assistance activating your [employer] account, call [phone]

## 2017-04-28 NOTE — IP AVS SNAPSHOT
" <p align=\"LEFT\"><IMG SRC=\"//[email or web address]$/[email or web address]\" alt=\"Image\" WIDTH=\"50%\" HEIGHT=\"200\" BORDER=\"\"></p>                   Name:Marilyn Strange  Medical Record Number:[id number]  CSN: [id number]    YOB: 1955   Age: 61 y.o.  Sex: female  HT:1.702 m (5' 7\") WT: 88.451 kg (195 lb)          Admit Date: 4/28/2017     Discharge Date:   Today's Date: 5/6/2017  Attending Doctor:  Robert Calvillo M.D.                  Allergies:  Review of patient's allergies indicates no known allergies.          Your appointments     Sep 05, 2017 10:00 AM   Follow Up with Jose Raul Magdaleno M.D.   [facility] Radiation Therapy (--)    [address] [id number] [phone]              Follow-up Information     1. Follow up with Alfa Sue M.D.. Call in 2 days.    Specialty:  Oncology    Why:  for follow up    Contact information    [address]   [address]  [facility] [address] [phone]           Medication List      Take these Medications        Instructions    aspirin 81 MG Chew chewable tablet   Commonly known as:  ASA    Take 81 mg by mouth every day.   Dose:  81 mg       atorvastatin 20 MG Tabs   Commonly known as:  LIPITOR    Take 20 mg by mouth every evening.   Dose:  20 mg       BREO ELLIPTA 100-25 MCG/INH Aepb   Generic drug:  Fluticasone Furoate-Vilanterol    Inhale 1 Puff by mouth every day.   Dose:  1 Puff       carvedilol 25 MG Tabs   Commonly known as:  COREG    Take 25 mg by mouth 2 times a day.   Dose:  25 mg       enalapril 20 MG tablet   Commonly known as:  VASOTEC    Take 20 mg by mouth 2 times a day.   Dose:  20 mg       metformin 500 MG Tabs   Commonly known as:  GLUCOPHAGE    Take 500 mg by mouth every day.   Dose:  500 mg       morphine ER 15 MG Tbcr tablet   Commonly known as:  MS CONTIN    Take 1 Tab by mouth 3 times a day.   Dose:  15 mg       oxycodone immediate-release 5 MG Tabs   Commonly known as:  ROXICODONE    Take 1 Tab by mouth every 8 hours as needed for Severe Pain.   Dose:  5 mg   "    oyster shell calcium/vitamin D 250-125 MG-UNIT Tabs tablet    Take 1 Tab by mouth 2 times a day.   Dose:  1 Tab       pantoprazole 40 MG Tbec   Commonly known as:  PROTONIX    Take 40 mg by mouth every day.   Dose:  40 mg       polyethylene glycol 3350 Powd   Commonly known as:  MIRALAX    Take 17 g by mouth every day.   Dose:  17 g

## 2017-04-29 ENCOUNTER — RESOLUTE PROFESSIONAL BILLING HOSPITAL PROF FEE (OUTPATIENT)
Dept: HOSPITALIST | Facility: MEDICAL CENTER | Age: 62
End: 2017-04-29
Payer: MEDICAID

## 2017-04-29 PROBLEM — K59.00 CONSTIPATION: Status: ACTIVE | Noted: 2017-04-29

## 2017-04-29 PROBLEM — R10.9 INTRACTABLE ABDOMINAL PAIN: Status: ACTIVE | Noted: 2017-04-29

## 2017-04-29 LAB
GLUCOSE BLD-MCNC: 104 MG/DL (ref 65–99)
GLUCOSE BLD-MCNC: 110 MG/DL (ref 65–99)
GLUCOSE BLD-MCNC: 112 MG/DL (ref 65–99)
GLUCOSE BLD-MCNC: 95 MG/DL (ref 65–99)

## 2017-04-29 PROCEDURE — A9270 NON-COVERED ITEM OR SERVICE: HCPCS

## 2017-04-29 PROCEDURE — 770009 HCHG ROOM/CARE - ONCOLOGY SEMI PRI*

## 2017-04-29 PROCEDURE — 82962 GLUCOSE BLOOD TEST: CPT

## 2017-04-29 PROCEDURE — A9270 NON-COVERED ITEM OR SERVICE: HCPCS | Performed by: HOSPITALIST

## 2017-04-29 PROCEDURE — 96361 HYDRATE IV INFUSION ADD-ON: CPT

## 2017-04-29 PROCEDURE — 99223 1ST HOSP IP/OBS HIGH 75: CPT | Performed by: HOSPITALIST

## 2017-04-29 PROCEDURE — A9270 NON-COVERED ITEM OR SERVICE: HCPCS | Performed by: INTERNAL MEDICINE

## 2017-04-29 PROCEDURE — 700102 HCHG RX REV CODE 250 W/ 637 OVERRIDE(OP)

## 2017-04-29 PROCEDURE — 99233 SBSQ HOSP IP/OBS HIGH 50: CPT | Performed by: INTERNAL MEDICINE

## 2017-04-29 PROCEDURE — 700102 HCHG RX REV CODE 250 W/ 637 OVERRIDE(OP): Performed by: INTERNAL MEDICINE

## 2017-04-29 PROCEDURE — 700105 HCHG RX REV CODE 258: Performed by: HOSPITALIST

## 2017-04-29 PROCEDURE — 700102 HCHG RX REV CODE 250 W/ 637 OVERRIDE(OP): Performed by: HOSPITALIST

## 2017-04-29 RX ORDER — OXYCODONE HYDROCHLORIDE 5 MG/1
5 TABLET ORAL
Status: DISCONTINUED | OUTPATIENT
Start: 2017-04-29 | End: 2017-05-06 | Stop reason: HOSPADM

## 2017-04-29 RX ORDER — POLYETHYLENE GLYCOL 3350 17 G/17G
1 POWDER, FOR SOLUTION ORAL
Status: DISCONTINUED | OUTPATIENT
Start: 2017-04-29 | End: 2017-05-06 | Stop reason: HOSPADM

## 2017-04-29 RX ORDER — ASPIRIN 81 MG/1
81 TABLET, CHEWABLE ORAL DAILY
Status: DISCONTINUED | OUTPATIENT
Start: 2017-04-29 | End: 2017-05-06 | Stop reason: HOSPADM

## 2017-04-29 RX ORDER — LACTULOSE 20 G/30ML
30 SOLUTION ORAL 3 TIMES DAILY
Status: DISCONTINUED | OUTPATIENT
Start: 2017-04-29 | End: 2017-04-30

## 2017-04-29 RX ORDER — PROMETHAZINE HYDROCHLORIDE 25 MG/1
12.5-25 TABLET ORAL EVERY 4 HOURS PRN
Status: DISCONTINUED | OUTPATIENT
Start: 2017-04-29 | End: 2017-05-06 | Stop reason: HOSPADM

## 2017-04-29 RX ORDER — ATORVASTATIN CALCIUM 20 MG/1
20 TABLET, FILM COATED ORAL NIGHTLY
Status: DISCONTINUED | OUTPATIENT
Start: 2017-04-29 | End: 2017-05-06 | Stop reason: HOSPADM

## 2017-04-29 RX ORDER — ONDANSETRON 2 MG/ML
4 INJECTION INTRAMUSCULAR; INTRAVENOUS EVERY 4 HOURS PRN
Status: DISCONTINUED | OUTPATIENT
Start: 2017-04-29 | End: 2017-05-06 | Stop reason: HOSPADM

## 2017-04-29 RX ORDER — OXYCODONE HYDROCHLORIDE 10 MG/1
10 TABLET ORAL
Status: DISCONTINUED | OUTPATIENT
Start: 2017-04-29 | End: 2017-05-05

## 2017-04-29 RX ORDER — PANTOPRAZOLE SODIUM 40 MG/1
40 TABLET, DELAYED RELEASE ORAL DAILY
Status: DISCONTINUED | OUTPATIENT
Start: 2017-04-29 | End: 2017-04-29

## 2017-04-29 RX ORDER — DEXTROSE MONOHYDRATE 25 G/50ML
25 INJECTION, SOLUTION INTRAVENOUS
Status: DISCONTINUED | OUTPATIENT
Start: 2017-04-29 | End: 2017-05-06 | Stop reason: HOSPADM

## 2017-04-29 RX ORDER — CARVEDILOL 25 MG/1
25 TABLET ORAL 2 TIMES DAILY
Status: DISCONTINUED | OUTPATIENT
Start: 2017-04-29 | End: 2017-05-06 | Stop reason: HOSPADM

## 2017-04-29 RX ORDER — ONDANSETRON 4 MG/1
4 TABLET, ORALLY DISINTEGRATING ORAL EVERY 4 HOURS PRN
Status: DISCONTINUED | OUTPATIENT
Start: 2017-04-29 | End: 2017-05-06 | Stop reason: HOSPADM

## 2017-04-29 RX ORDER — OMEPRAZOLE 20 MG/1
20 CAPSULE, DELAYED RELEASE ORAL DAILY
Status: DISCONTINUED | OUTPATIENT
Start: 2017-04-29 | End: 2017-05-06 | Stop reason: HOSPADM

## 2017-04-29 RX ORDER — PROMETHAZINE HYDROCHLORIDE 25 MG/1
12.5-25 SUPPOSITORY RECTAL EVERY 4 HOURS PRN
Status: DISCONTINUED | OUTPATIENT
Start: 2017-04-29 | End: 2017-05-06 | Stop reason: HOSPADM

## 2017-04-29 RX ORDER — ENALAPRIL MALEATE 10 MG/1
20 TABLET ORAL 2 TIMES DAILY
Status: DISCONTINUED | OUTPATIENT
Start: 2017-04-29 | End: 2017-05-06 | Stop reason: HOSPADM

## 2017-04-29 RX ORDER — AMOXICILLIN 250 MG
2 CAPSULE ORAL 2 TIMES DAILY
Status: DISCONTINUED | OUTPATIENT
Start: 2017-04-29 | End: 2017-05-06 | Stop reason: HOSPADM

## 2017-04-29 RX ORDER — SODIUM CHLORIDE 9 MG/ML
INJECTION, SOLUTION INTRAVENOUS CONTINUOUS
Status: ACTIVE | OUTPATIENT
Start: 2017-04-29 | End: 2017-04-30

## 2017-04-29 RX ORDER — ACETAMINOPHEN 325 MG/1
650 TABLET ORAL EVERY 6 HOURS PRN
Status: DISCONTINUED | OUTPATIENT
Start: 2017-04-29 | End: 2017-05-06 | Stop reason: HOSPADM

## 2017-04-29 RX ORDER — BISACODYL 10 MG
10 SUPPOSITORY, RECTAL RECTAL
Status: DISCONTINUED | OUTPATIENT
Start: 2017-04-29 | End: 2017-05-06 | Stop reason: HOSPADM

## 2017-04-29 RX ADMIN — LACTULOSE 30 ML: 20 SOLUTION ORAL at 10:17

## 2017-04-29 RX ADMIN — OXYCODONE HYDROCHLORIDE 10 MG: 10 TABLET ORAL at 18:08

## 2017-04-29 RX ADMIN — ENALAPRIL MALEATE 20 MG: 10 TABLET ORAL at 08:45

## 2017-04-29 RX ADMIN — CARVEDILOL 25 MG: 25 TABLET, FILM COATED ORAL at 08:45

## 2017-04-29 RX ADMIN — OXYCODONE HYDROCHLORIDE 10 MG: 10 TABLET ORAL at 22:23

## 2017-04-29 RX ADMIN — OXYCODONE HYDROCHLORIDE 10 MG: 10 TABLET ORAL at 02:33

## 2017-04-29 RX ADMIN — LACTULOSE 30 ML: 20 SOLUTION ORAL at 14:25

## 2017-04-29 RX ADMIN — OXYCODONE HYDROCHLORIDE 10 MG: 10 TABLET ORAL at 10:24

## 2017-04-29 RX ADMIN — STANDARDIZED SENNA CONCENTRATE AND DOCUSATE SODIUM 2 TABLET: 8.6; 5 TABLET, FILM COATED ORAL at 20:47

## 2017-04-29 RX ADMIN — ENALAPRIL MALEATE 20 MG: 10 TABLET ORAL at 20:48

## 2017-04-29 RX ADMIN — STANDARDIZED SENNA CONCENTRATE AND DOCUSATE SODIUM 2 TABLET: 8.6; 5 TABLET, FILM COATED ORAL at 08:45

## 2017-04-29 RX ADMIN — OXYCODONE HYDROCHLORIDE 10 MG: 10 TABLET ORAL at 14:25

## 2017-04-29 RX ADMIN — OXYCODONE HYDROCHLORIDE 10 MG: 10 TABLET ORAL at 06:27

## 2017-04-29 RX ADMIN — SODIUM CHLORIDE: 9 INJECTION, SOLUTION INTRAVENOUS at 02:04

## 2017-04-29 RX ADMIN — ATORVASTATIN CALCIUM 20 MG: 20 TABLET, FILM COATED ORAL at 20:48

## 2017-04-29 RX ADMIN — ASPIRIN 81 MG: 81 TABLET, CHEWABLE ORAL at 08:45

## 2017-04-29 RX ADMIN — CARVEDILOL 25 MG: 25 TABLET, FILM COATED ORAL at 20:49

## 2017-04-29 RX ADMIN — OMEPRAZOLE 20 MG: 20 CAPSULE, DELAYED RELEASE ORAL at 08:45

## 2017-04-29 ASSESSMENT — ENCOUNTER SYMPTOMS
NERVOUS/ANXIOUS: 0
DIARRHEA: 0
FOCAL WEAKNESS: 0
ABDOMINAL PAIN: 1
SPEECH CHANGE: 0
NAUSEA: 0
SPUTUM PRODUCTION: 0
COUGH: 0
DEPRESSION: 0
MYALGIAS: 0
DIAPHORESIS: 0
HEARTBURN: 0
HEADACHES: 0
DIZZINESS: 0
BLURRED VISION: 0
PHOTOPHOBIA: 0
FEVER: 0

## 2017-04-29 ASSESSMENT — PAIN SCALES - GENERAL
PAINLEVEL_OUTOF10: 3
PAINLEVEL_OUTOF10: 6
PAINLEVEL_OUTOF10: 9
PAINLEVEL_OUTOF10: 3
PAINLEVEL_OUTOF10: 2
PAINLEVEL_OUTOF10: 2
PAINLEVEL_OUTOF10: 6
PAINLEVEL_OUTOF10: 6

## 2017-04-29 ASSESSMENT — LIFESTYLE VARIABLES
EVER_SMOKED: YES
ALCOHOL_USE: NO

## 2017-04-29 NOTE — CARE PLAN
Problem: Communication  Goal: The ability to communicate needs accurately and effectively will improve  Intervention: [facility] patient and significant other/support system to call light to alert staff of needs  Pt oriented to unit and unit routine. Pt able to use call light appropriately.      Problem: Pain Management  Goal: Pain level will decrease to patient’s comfort goal  Intervention: Follow pain managment plan developed in collaboration with patient and Interdisciplinary Team  Medicated for pain with good results AEB upon reassessment, pt sleeping soundly.

## 2017-04-29 NOTE — H&P
CHIEF COMPLAINT:  Abdominal pain.    PRIMARY MEDICAL PHYSICIAN:  Dr. Clara Noble.    ONCOLOGIST:  Dr. Sue    HISTORY OF PRESENT ILLNESS:  This is a 61-year-old female who very   unfortunately carries a very strong history of multiple cancers.  She had just   recently completed treatment for rectal cancer in August.  She had been   followed up by Dr. Sue on a regular basis.  However, approximately 3-4   months after her treatment ended, she began to have abdominal pain.  She also   had some issues with constipation; however, she was started on a bowel regimen   and this has since resolved; unfortunately, her abdominal pain persisted.  She has   been in and out of various emergency rooms multiple times over the last 3-4   months.  She has a left upper quadrant pain, which radiates to the right upper   quadrant.  She has a poor appetite with early satiety.  She was given pain   medications by Dr. Sue; but this has not improved her   symptoms.  She also has occasional nausea.  She was seen on Tuesday at [facility] and imaging apparently found new lesions in her abdomen   including liver masses.  She was seen by Dr. Sue yesterday and plans were   to continue outpatient workup for a potential recurrent metastatic disease.  She   denies any chest pains or shortness of breath.  No fevers, chills.  No   diarrhea, constipation, or dysuria.    REVIEW OF SYSTEMS:  A full review of systems was completed and all pertinent   positives and negatives are included in the HPI above.    PAST MEDICAL HISTORY:  1.  Breast, cervical and rectal cancer.  2.  Hypertension.  3.  Hyperlipidemia.  4.  Diabetes mellitus.  5.  GERD.  6.  MAXIMO.    PAST SURGICAL HISTORY:  1.  Hysterectomy.  2.  Left knee scope.  3.  Left mastectomy.  4.  Sigmoidoscopy.    SOCIAL HISTORY:  Patient quit smoking in 2009, she carries a 10-pack-year   history.  She denies any alcohol or illicit drugs.    FAMILY HISTORY:  Negative for CVA.   Her sister does have coronary artery   disease.    ALLERGIES:  No known drug allergies.    HOME MEDICATIONS:  1.  Breo Ellipta 1 puff daily.  2.  Oxycodone 5 mg p.o. q. 8 hours p.r.n. severe pain.  3.  Vasotec 20 mg p.o. b.i.d.  4.  Coreg 25 mg p.o. b.i.d.  5.  Metformin 500 mg p.o. daily.  6.  Protonix 40 mg p.o. daily.  7.  Lipitor 20 mg p.o. daily.  8.  Calcium and vitamin D.  9.  Aspirin 81 mg p.o. daily.    PHYSICAL EXAMINATION:  VITAL SIGNS:  Temperature 97.9, heart rate 118, respirations 18, blood   pressure is 143/68.  Patient is saturating at 100% on room air.  GENERAL:  This is an older  female who is in no acute   distress.  HEENT:  Normocephalic, atraumatic, EOMI, dry mucous membranes.  NECK:  Supple, there is no JVD.  There is no supraclavicular adenopathy.  CARDIOVASCULAR:  Positive S1, S2, regular rate and rhythm.  No murmurs, rubs,   or gallops appreciated.  PULMONARY:  Clear to auscultation bilaterally.  No wheezes, rubs, or rhonchi   heard.  ABDOMEN:  Soft, mildly tender in the left upper quadrant, no rebound or   guarding.  Difficult to assess for splenomegaly secondary to pain.  Positive   bowel sounds.  EXTREMITIES:  Warm, well perfused.  No clubbing, cyanosis, or edema.    Capillary refill less than 3 seconds.  Distal pulses are intact.  NEUROLOGIC:  A and O x3.  Cranial nerves II-XII grossly intact.    LABORATORY STUDIES:  WBC 9.6, hemoglobin 11.0, hematocrit 33.3, platelet 372.    Sodium 134, potassium 4.0, chloride 98, CO2 of 24, glucose 134, BUN 9,   creatinine 0.72, GFR greater than 60.  Lipase 22.    IMAGING:  CT of the abdomen and pelvis:  New hepatic metastases fluid or soft   tissue mass along the greater curvature of the stomach.  No evidence of bowel   dilatation, stranding in the mesentery, trace ascites, stable nonspecific 15   mm right adrenal gland nodule.    ASSESSMENT AND PLAN:  This is a 61-year-old female who comes in with   complaints of abdominal pain.  She  has just been recently diagnosed with   reoccurrence of cancer with likely metastatic disease.  1.  Intractable abdominal pain:  Given the patient's diagnosis of recurrent  metastatic cancer with new lesions in the liver, I do suspect that this is   secondary to her underlying malignancy.  Possible carcinomatosis.  She will be   admitted to the oncology for close monitoring and pain control.  If we are   having greater difficulty controlling her pain, then we will consider a   palliative care consultation.  We will notify oncology in the morning of the   patient's admission.  2.  Hypertension.  Continue home Coreg and enalapril.  3.  Diabetes mellitus.  Hold home metformin, monitor with Accu-Cheks and cover   with insulin sliding scale.  4.  Hyperlipidemia.  Continue home aspirin and Lipitor.    DISPOSITION:  Oncology.    PROPHYLAXIS:  Sequential compression devices for DVT prophylaxis, no PPI   indicated, stool softeners ordered.    CODE:  Full.       ____________________________________     MD ROSARIO ZHU / NTS    DD:  04/29/2017 03:14:06  DT:  04/29/2017 03:43:04    D#:  [id number]  Job#:  [id number]

## 2017-04-29 NOTE — ED NOTES
"Med rec completed per pt at bedside  Allergies reviewed - NKDA  No ABX in last 30 days   Pt states she has not taken anything but oxycodone  \"in a few days\"  "

## 2017-04-29 NOTE — ED NOTES
Marilyn Cox Strange  61 y.o.  female  Chief Complaint   Patient presents with   • Abdominal Pain     Present to triage c/o RUQ pain since Sunday, tonight worse. Patient was seen at [facility] Tuesday and was Dx of stage 4 stomach CA. Patient just finished chemo therapy for her anal / rectal CA. + nausea. Denies vomiting. Very uncomfortable.

## 2017-04-29 NOTE — PROGRESS NOTES
Hospital Medicine Progress Note, Adult, Complex               Author: Kacy Aviles Date & Time created: 4/29/2017  3:44 PM     Interval History:  Patient troubled with 3-4 months of unexplained abdominal pain, last visit to [facility] showed concerns of new liver lesions and thickening of stomach wall.  She had presented back to Dr Griffith and coordination of biopsy was to be done but she had worsening pain again last night and came to ED again.  She is feeling better with the pain medications she has been given here.  She has a huge stool burden seen on CT and this could also be significantly contributing to her pain.  Lactulose scheduled started.  She will also need GI consult for UGI endoscopy for evaluation of her stomach given wall thickening - Dr Dias did a colonoscopy on her in 2/2017 - Will discuss further with GI about getting this done prior to dc once her stool burden has been alleviated.    Review of Systems:  Review of Systems   Constitutional: Negative for fever and diaphoresis.   HENT: Negative for congestion.    Eyes: Negative for blurred vision and photophobia.   Respiratory: Negative for cough and sputum production.    Cardiovascular: Negative for chest pain and leg swelling.   Gastrointestinal: Positive for abdominal pain. Negative for heartburn, nausea and diarrhea.   Genitourinary: Negative for dysuria and hematuria.   Musculoskeletal: Negative for myalgias and joint pain.   Skin: Negative for itching and rash.   Neurological: Negative for dizziness, speech change, focal weakness and headaches.   Psychiatric/Behavioral: Negative for depression. The patient is not nervous/anxious.        Physical Exam:  Physical Exam   Constitutional: She is oriented to person, place, and time. She appears well-developed and well-nourished. No distress.   HENT:   Head: Normocephalic and atraumatic.   Eyes: Conjunctivae are normal. No scleral icterus.   Neck: Neck supple. No JVD present.   Cardiovascular: Normal  rate and regular rhythm.  Exam reveals no gallop and no friction rub.    No murmur heard.  Pulmonary/Chest: Effort normal and breath sounds normal. No respiratory distress. She exhibits no tenderness.   Abdominal: Soft. Bowel sounds are normal. She exhibits no distension and no mass. There is no tenderness.   Musculoskeletal: She exhibits no edema or tenderness.   Neurological: She is alert and oriented to person, place, and time. No cranial nerve deficit.   Skin: Skin is dry. She is not diaphoretic. No erythema.   Psychiatric: She has a normal mood and affect. Her behavior is normal.   Nursing note and vitals reviewed.      Labs:        Invalid input(s): [id number]      Recent Labs      17   SODIUM  132*   POTASSIUM  4.0   CHLORIDE  98   CO2  24   BUN  9   CREATININE  0.72   CALCIUM  8.9     Recent Labs      17   ALTSGPT  16   ASTSGOT  19   ALKPHOSPHAT  108*   TBILIRUBIN  0.9   LIPASE  22   GLUCOSE  134*     Recent Labs      17   RBC  3.69*   HEMOGLOBIN  11.0*   HEMATOCRIT  33.3*   PLATELETCT  372   PROTHROMBTM  15.1*   APTT  43.2*   INR  1.15*     Recent Labs      17   WBC  9.6   NEUTSPOLYS  83.80*   LYMPHOCYTES  7.00*   MONOCYTES  8.30   EOSINOPHILS  0.30   BASOPHILS  0.30   ASTSGOT  19   ALTSGPT  16   ALKPHOSPHAT  108*   TBILIRUBIN  0.9           Hemodynamics:  Temp (24hrs), Av.6 °C (97.9 °F), Min:36.5 °C (97.7 °F), Max:36.8 °C (98.2 °F)  Temperature: 36.8 °C (98.2 °F)  Pulse  Av.9  Min: 82  Max: 118Heart Rate (Monitored): 95  Blood Pressure: 108/63 mmHg, NIBP: 137/68 mmHg     Respiratory:    Respiration: 16, Pulse Oximetry: 94 %           Fluids:    Intake/Output Summary (Last 24 hours) at 17 1544  Last data filed at 17 0646   Gross per 24 hour   Intake    591 ml   Output      0 ml   Net    591 ml     Weight: 88.451 kg (195 lb)  GI/Nutrition:  Orders Placed This Encounter   Procedures   • Diet Order     Standing Status: Standing       Number of Occurrences: 1      Standing Expiration Date:      Order Specific Question:  Diet:     Answer:  Diabetic [3]     Medical Decision Making, by Problem:  Active Hospital Problems    Diagnosis   • Anal carcinoma (CMS-HCC) [C21.0]concern for recurrence after treatments with stomach wall thickening and liver lesion     • Intractable abdominal pain [R10.9]better, ?constipation vs lesions     • Constipation [K59.00]bowel protocol, scheduled lactulose until BM     • DMII (diabetes mellitus, type 2) (CMS-HCC) [E11.9]diabetic diet, ssi     • HTN (hypertension) [I10]coreg     • HLD (hyperlipidemia) [E78.5]statin       Labs reviewed, Medications reviewed and Radiology images reviewed  Ivan catheter: No Ivan        DVT prophylaxis - mechanical: SCDs

## 2017-04-29 NOTE — PROGRESS NOTES
Assumed care of pt at 0705. Assessment completed. Pt awake, alert and oriented x4 . Pt is a SBA with hand-held assistance. C/o 6/10 pain, medicated per MAR. Understands plan of care. All needs met, bed in lowest locked position, call light within reach. Hourly rounding in place.

## 2017-04-29 NOTE — PROGRESS NOTES
Pt on unit, oriented.  Pt is A&0x, port patent, + blood return. Port was accessed prior to arrival on unit, in the ER. Dressing CDI.   Skin intact. Will medicate for pain. Started IV fluids.    Admit profile complete.   Pt verbalized she would not ambulate on her own, will call for assistance and gave demonstration she knows how to use call light.   All needs met at this time. Bed in lowest & locked position, call light within reach.

## 2017-04-29 NOTE — ED PROVIDER NOTES
"ED Provider Note    CHIEF COMPLAINT  Chief Complaint   Patient presents with   • Abdominal Pain       HPI  Marilyn Strange is a 61 y.o. 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 10 out of 10, sharp, cramping, intermittent but becoming more persistent today. No fever or chills. No nausea or vomiting. No bowel movement for 5 days. Patient denies rectal bleeding. Patient states she was seen at [place] earlier this week and diagnosed with \"stage IV stomach cancer.\" She states that she saw her oncologist today who agreed to do more studies and a biopsy, her pain persisted this evening requiring hospital evaluation. Patient's home pain regimen includes oxycodone 10, ineffective today.    REVIEW OF SYSTEMS  See [facility] for further details. All other systems are negative.     PAST MEDICAL HISTORY   has a past medical history of Rectal cancer (CMS-HCC); Hypertension; Cervical cancer (CMS-HCC); Breast cancer (CMS-HCC); Arthritis; Diabetes (CMS-HCC); Heart burn; Bowel habit changes; Asthma; Breath shortness; Snoring; Sleep apnea; and Cancer (CMS-HCC) (1991; 1996,5/2016).    SOCIAL HISTORY  Social History     Social History Main Topics   • Smoking status: Former Smoker -- 0.50 packs/day for 20 years     Quit date: 01/01/2009   • Smokeless tobacco: Former User     Quit date: 08/21/2007   • Alcohol Use: No   • Drug Use: No   • Sexual Activity: Not on file       SURGICAL HISTORY   has past surgical history that includes hysterectomy, total abdominal (1996); knee arthroscopy (Left, 2004); mastectomy (Left, 1991); mammoplasty augmentation (1991); bunionectomy (Left, 2007); angiogram (2014); breast biopsy (Right, 2015); sigmoidoscopy-flexible (N/A, 2/22/2017); and " "sigmoidoscopy w/endoscopic us (N/A, 2/22/2017).    CURRENT MEDICATIONS  Home Medications     Reviewed by Berna Shaw (Pharmacy Tech) on 04/28/17 at 2347  Med List Status: Complete    Medication Last Dose Status    aspirin (ASA) 81 MG Chew Tab chewable tablet >3 days Active    atorvastatin (LIPITOR) 20 MG Tab >3 days Active    Calcium Carb-Cholecalciferol (OYSTER SHELL CALCIUM/VITAMIN D) 250-125 MG-UNIT Tab tablet >3 days Active    carvedilol (COREG) 25 MG Tab >3 days Active    enalapril (VASOTEC) 20 MG tablet >3 days Active    Fluticasone Furoate-Vilanterol (BREO ELLIPTA) 100-25 MCG/INH AEROSOL POWDER, BREATH ACTIVATED >3 days Active    metformin (GLUCOPHAGE) 500 MG Tab >3 days Active    oxycodone immediate-release (ROXICODONE) 5 MG Tab 4/28/2017 Active    pantoprazole (PROTONIX) 40 MG Tablet Delayed Response >3 days Active                ALLERGIES  No Known Allergies    PHYSICAL EXAM  VITAL SIGNS: /90 mmHg  Pulse 117  Temp(Src) 36.6 °C (97.9 °F)  Resp 18  Ht 1.702 m (5' 7\")  Wt 88.451 kg (195 lb)  BMI 30.53 kg/m2  SpO2 99%  Pulse ox interpretation: I interpret this pulse ox as normal.  Constitutional: Alert. Pleasant. Moderate distress secondary to discomfort.  HENT: Normocephalic, atraumatic. Bilateral external ears normal, Nose normal. Slightly dry mucous membranes.    Eyes: Pupils are equal and reactive, Conjunctiva normal.   Neck: Normal range of motion, Supple.   Cardiovascular: Regular rate and rhythm, no murmurs. Distal pulses intact.  No peripheral edema.  Thorax & Lungs: Normal breath sounds.  No wheezing/rales/ronchi. No increased work of breathing, clipped speech or retractions.   Abdomen: Soft, slightly distended. Diffusely tender to palpation, greatest in the right upper quadrant. Voluntary guarding without peritonitis. No palpable pulsatile mass.  Skin: Warm, Dry, No erythema, No rash.   Musculoskeletal: Good range of motion in all major joints.  Neurologic: Alert , no gross " focal deficit noted.  Psychiatric: Affect normal, Judgment normal, Mood normal.       DIAGNOSTIC STUDIES / PROCEDURES    LABS  Results for orders placed or performed during the hospital encounter of 04/28/17   CBC WITH DIFFERENTIAL   Result Value Ref Range    WBC 9.6 4.8 - 10.8 K/uL    RBC 3.69 (L) 4.20 - 5.40 M/uL    Hemoglobin 11.0 (L) 12.0 - 16.0 g/dL    Hematocrit 33.3 (L) 37.0 - 47.0 %    MCV 90.2 81.4 - 97.8 fL    MCH 29.8 27.0 - 33.0 pg    MCHC 33.0 (L) 33.6 - 35.0 g/dL    RDW 47.0 35.9 - 50.0 fL    Platelet Count 372 164 - 446 K/uL    MPV 8.9 (L) 9.0 - 12.9 fL    Neutrophils-Polys 83.80 (H) 44.00 - 72.00 %    Lymphocytes 7.00 (L) 22.00 - 41.00 %    Monocytes 8.30 0.00 - 13.40 %    Eosinophils 0.30 0.00 - 6.90 %    Basophils 0.30 0.00 - 1.80 %    Immature Granulocytes 0.30 0.00 - 0.90 %    Nucleated RBC 0.00 /100 WBC    Neutrophils (Absolute) 8.08 (H) 2.00 - 7.15 K/uL    Lymphs (Absolute) 0.67 (L) 1.00 - 4.80 K/uL    Monos (Absolute) 0.80 0.00 - 0.85 K/uL    Eos (Absolute) 0.03 0.00 - 0.51 K/uL    Baso (Absolute) 0.03 0.00 - 0.12 K/uL    Immature Granulocytes (abs) 0.03 0.00 - 0.11 K/uL    NRBC (Absolute) 0.00 K/uL   COMP METABOLIC PANEL   Result Value Ref Range    Sodium 132 (L) 135 - 145 mmol/L    Potassium 4.0 3.6 - 5.5 mmol/L    Chloride 98 96 - 112 mmol/L    Co2 24 20 - 33 mmol/L    Anion Gap 10.0 0.0 - 11.9    Glucose 134 (H) 65 - 99 mg/dL    Bun 9 8 - 22 mg/dL    Creatinine 0.72 0.50 - 1.40 mg/dL    Calcium 8.9 8.5 - 10.5 mg/dL    AST(SGOT) 19 12 - 45 U/L    ALT(SGPT) 16 2 - 50 U/L    Alkaline Phosphatase 108 (H) 30 - 99 U/L    Total Bilirubin 0.9 0.1 - 1.5 mg/dL    Albumin 3.7 3.2 - 4.9 g/dL    Total Protein 7.6 6.0 - 8.2 g/dL    Globulin 3.9 (H) 1.9 - 3.5 g/dL    A-G Ratio 0.9 g/dL   LIPASE   Result Value Ref Range    Lipase 22 11 - 82 U/L   PROTHROMBIN TIME (INR)   Result Value Ref Range    PT 15.1 (H) 12.0 - 14.6 sec    INR 1.15 (H) 0.87 - 1.13   APTT   Result Value Ref Range    APTT 43.2 (H) 24.7  - 36.0 sec   ESTIMATED GFR   Result Value Ref Range    GFR If African American >60 >60 mL/min/1.73 m 2    GFR If Non African American >60 >60 mL/min/1.73 m 2     RADIOLOGY  CT-ABDOMEN-PELVIS WITH   Final Result      1.  New hepatic metastases.      2.  Fluid or soft tissue mass along the greater curvature of the stomach.      3.  No evidence of bowel dilatation.      4.  Stranding in the mesentery with trace ascites.      5.  Stable nonspecific 15 mm right adrenal gland nodule.        COURSE & MEDICAL DECISION MAKING  Nursing notes and vital signs were reviewed. (See chart for details)  The patients records were reviewed, history was obtained from the patient;     Medical record review: Records obtained from Roslyn's visit earlier this week, CT the abdomen and pelvis with multiple new liver lesions concerning for metastatic process, pericolic gutter stranding concerning for carcinomatosis, ascites. Patient was evaluated by surgery in the department, no indication for urgent intervention, she was discharged home to follow-up with oncology.    ED evaluation for right upper quadrant abdominal pain is concerning for developing metastatic process, possibly secondary to her initial squamous cell carcinoma of the anus. Further evaluation is necessary, unfortunately patient's pain is uncontrolled after 4 mg of Dilaudid in the emergency department; therefore she will be admitted to the hospitalist for pain control and further evaluation. Labs are otherwise within normal limits, mild anemia unchanged from previous. No LFT or electrolyte derangement. Vital signs are stable without fever or tachycardia repeat abdominal exam is unchanged, tender but without peritonitis.    12:02 AM Dr. Vance is aware the patient agreeable to admission.     FINAL IMPRESSION  (R10.9) Intractable abdominal pain  (primary encounter diagnosis)  (C80.1) Metastatic cancer (CMS-HCC)  (R19.00) Abdominal mass, unspecified location      Electronically  signed by: Lisy Bruce, 4/28/2017 8:56 PM      This dictation was created using voice recognition software. The accuracy of the dictation is limited to the abilities of the software. I expect there may be some errors of grammar and possibly content. The nursing notes were reviewed and certain aspects of this information were incorporated into this note.

## 2017-04-30 LAB
ALBUMIN SERPL BCP-MCNC: 3 G/DL (ref 3.2–4.9)
ALBUMIN/GLOB SERPL: 0.9 G/DL
ALP SERPL-CCNC: 91 U/L (ref 30–99)
ALT SERPL-CCNC: 11 U/L (ref 2–50)
ANION GAP SERPL CALC-SCNC: 6 MMOL/L (ref 0–11.9)
AST SERPL-CCNC: 14 U/L (ref 12–45)
BILIRUB SERPL-MCNC: 0.6 MG/DL (ref 0.1–1.5)
BUN SERPL-MCNC: 7 MG/DL (ref 8–22)
CALCIUM SERPL-MCNC: 8.3 MG/DL (ref 8.5–10.5)
CHLORIDE SERPL-SCNC: 104 MMOL/L (ref 96–112)
CO2 SERPL-SCNC: 27 MMOL/L (ref 20–33)
CREAT SERPL-MCNC: 0.69 MG/DL (ref 0.5–1.4)
ERYTHROCYTE [DISTWIDTH] IN BLOOD BY AUTOMATED COUNT: 47.8 FL (ref 35.9–50)
GFR SERPL CREATININE-BSD FRML MDRD: >60 ML/MIN/1.73 M 2
GLOBULIN SER CALC-MCNC: 3.2 G/DL (ref 1.9–3.5)
GLUCOSE BLD-MCNC: 100 MG/DL (ref 65–99)
GLUCOSE BLD-MCNC: 106 MG/DL (ref 65–99)
GLUCOSE BLD-MCNC: 129 MG/DL (ref 65–99)
GLUCOSE BLD-MCNC: 95 MG/DL (ref 65–99)
GLUCOSE SERPL-MCNC: 106 MG/DL (ref 65–99)
HCT VFR BLD AUTO: 28.9 % (ref 37–47)
HGB BLD-MCNC: 9.2 G/DL (ref 12–16)
MCH RBC QN AUTO: 29.6 PG (ref 27–33)
MCHC RBC AUTO-ENTMCNC: 31.8 G/DL (ref 33.6–35)
MCV RBC AUTO: 92.9 FL (ref 81.4–97.8)
PLATELET # BLD AUTO: 320 K/UL (ref 164–446)
PMV BLD AUTO: 9.1 FL (ref 9–12.9)
POTASSIUM SERPL-SCNC: 3.8 MMOL/L (ref 3.6–5.5)
PROT SERPL-MCNC: 6.2 G/DL (ref 6–8.2)
RBC # BLD AUTO: 3.11 M/UL (ref 4.2–5.4)
SODIUM SERPL-SCNC: 137 MMOL/L (ref 135–145)
WBC # BLD AUTO: 6.3 K/UL (ref 4.8–10.8)

## 2017-04-30 PROCEDURE — 700102 HCHG RX REV CODE 250 W/ 637 OVERRIDE(OP): Performed by: HOSPITALIST

## 2017-04-30 PROCEDURE — A9270 NON-COVERED ITEM OR SERVICE: HCPCS | Performed by: HOSPITALIST

## 2017-04-30 PROCEDURE — 700102 HCHG RX REV CODE 250 W/ 637 OVERRIDE(OP)

## 2017-04-30 PROCEDURE — 82962 GLUCOSE BLOOD TEST: CPT

## 2017-04-30 PROCEDURE — 85027 COMPLETE CBC AUTOMATED: CPT

## 2017-04-30 PROCEDURE — A9270 NON-COVERED ITEM OR SERVICE: HCPCS

## 2017-04-30 PROCEDURE — 700111 HCHG RX REV CODE 636 W/ 250 OVERRIDE (IP): Performed by: HOSPITALIST

## 2017-04-30 PROCEDURE — 99232 SBSQ HOSP IP/OBS MODERATE 35: CPT | Performed by: INTERNAL MEDICINE

## 2017-04-30 PROCEDURE — 770009 HCHG ROOM/CARE - ONCOLOGY SEMI PRI*

## 2017-04-30 PROCEDURE — 80053 COMPREHEN METABOLIC PANEL: CPT

## 2017-04-30 RX ADMIN — OXYCODONE HYDROCHLORIDE 10 MG: 10 TABLET ORAL at 14:20

## 2017-04-30 RX ADMIN — OXYCODONE HYDROCHLORIDE 10 MG: 10 TABLET ORAL at 17:26

## 2017-04-30 RX ADMIN — ENALAPRIL MALEATE 20 MG: 10 TABLET ORAL at 08:39

## 2017-04-30 RX ADMIN — OXYCODONE HYDROCHLORIDE 10 MG: 10 TABLET ORAL at 20:49

## 2017-04-30 RX ADMIN — STANDARDIZED SENNA CONCENTRATE AND DOCUSATE SODIUM 2 TABLET: 8.6; 5 TABLET, FILM COATED ORAL at 20:50

## 2017-04-30 RX ADMIN — STANDARDIZED SENNA CONCENTRATE AND DOCUSATE SODIUM 2 TABLET: 8.6; 5 TABLET, FILM COATED ORAL at 08:39

## 2017-04-30 RX ADMIN — OXYCODONE HYDROCHLORIDE 10 MG: 10 TABLET ORAL at 03:00

## 2017-04-30 RX ADMIN — ASPIRIN 81 MG: 81 TABLET, CHEWABLE ORAL at 08:39

## 2017-04-30 RX ADMIN — CARVEDILOL 25 MG: 25 TABLET, FILM COATED ORAL at 20:51

## 2017-04-30 RX ADMIN — OXYCODONE HYDROCHLORIDE 10 MG: 10 TABLET ORAL at 06:26

## 2017-04-30 RX ADMIN — OXYCODONE HYDROCHLORIDE 10 MG: 10 TABLET ORAL at 11:01

## 2017-04-30 RX ADMIN — ATORVASTATIN CALCIUM 20 MG: 20 TABLET, FILM COATED ORAL at 20:50

## 2017-04-30 RX ADMIN — CARVEDILOL 25 MG: 25 TABLET, FILM COATED ORAL at 08:39

## 2017-04-30 RX ADMIN — OMEPRAZOLE 20 MG: 20 CAPSULE, DELAYED RELEASE ORAL at 08:39

## 2017-04-30 RX ADMIN — ENALAPRIL MALEATE 20 MG: 10 TABLET ORAL at 20:51

## 2017-04-30 RX ADMIN — ONDANSETRON 4 MG: 4 TABLET, ORALLY DISINTEGRATING ORAL at 09:25

## 2017-04-30 ASSESSMENT — ENCOUNTER SYMPTOMS
DIARRHEA: 1
FOCAL WEAKNESS: 0
COUGH: 0
ABDOMINAL PAIN: 1
FEVER: 0
SPEECH CHANGE: 0
SPUTUM PRODUCTION: 0
DIAPHORESIS: 0
DIZZINESS: 0
NERVOUS/ANXIOUS: 0
HEADACHES: 0
CONSTIPATION: 0
PHOTOPHOBIA: 0
NAUSEA: 0
MYALGIAS: 0
HEARTBURN: 0
DEPRESSION: 0
BLURRED VISION: 0

## 2017-04-30 ASSESSMENT — PAIN SCALES - GENERAL
PAINLEVEL_OUTOF10: 5
PAINLEVEL_OUTOF10: 3
PAINLEVEL_OUTOF10: 3
PAINLEVEL_OUTOF10: 6

## 2017-04-30 NOTE — PROGRESS NOTES
"Assumed care of patient this evening.  Patient reports diarrhea and an appetite.  Provided with snacks, and holding lactulose.  /64 mmHg  Pulse 90  Temp(Src) 36.6 °C (97.8 °F)  Resp 20  Ht 1.702 m (5' 7\")  Wt 88.451 kg (195 lb)  BMI 30.53 kg/m2  SpO2 96%  Breastfeeding? No  I will continue to ensure pain management, continue to assess and monitor.  "

## 2017-04-30 NOTE — PROGRESS NOTES
"Assumed care of pt at 0655. Assessment completed. Pt awake, alert and oriented x4 . Pt is up SBA to BSC. C/o abdominal pain, requests to take pain medication in \"awhile\". Medicated for complaints of nausea per MAR. Pt c/o loose stools. Understands plan of care. All needs met, bed in lowest locked position, call light within reach. Hourly rounding in place.   "

## 2017-04-30 NOTE — PROGRESS NOTES
Hospital Medicine Progress Note, Adult, Complex               Author: Kacy Aviles Date & Time created: 4/30/2017  4:05 PM     Interval History:  4/29 Patient troubled with 3-4 months of unexplained abdominal pain, last visit to [facility] showed concerns of new liver lesions and thickening of stomach wall.  She had presented back to Dr Griffith and coordination of biopsy was to be done but she had worsening pain again last night and came to ED again.  She is feeling better with the pain medications she has been given here.  She has a huge stool burden seen on CT and this could also be significantly contributing to her pain.  Lactulose scheduled started.  She will also need GI consult for UGI endoscopy for evaluation of her stomach given wall thickening - Dr Dias did a colonoscopy on her in 2/2017 - Will discuss further with GI about getting this done prior to dc once her stool burden has been alleviated.  4/30 Patient feeling better today, had large amount of bowel movements with lactulose overnight.  She states her abdominal pain is better and controlled with oral medications.  Will consult GI tomorrow as constipation resolved and pain still persists.  No acute complaints.  H/h dropped with hydration, no evidence of bleeding.    Review of Systems:  Review of Systems   Constitutional: Negative for fever and diaphoresis.   HENT: Negative for congestion.    Eyes: Negative for blurred vision and photophobia.   Respiratory: Negative for cough and sputum production.    Cardiovascular: Negative for chest pain and leg swelling.   Gastrointestinal: Positive for abdominal pain and diarrhea (after lactulose). Negative for heartburn, nausea and constipation (resolved).   Genitourinary: Negative for dysuria and hematuria.   Musculoskeletal: Negative for myalgias and joint pain.   Skin: Negative for itching and rash.   Neurological: Negative for dizziness, speech change, focal weakness and headaches.   Psychiatric/Behavioral:  Negative for depression. The patient is not nervous/anxious.        Physical Exam:  Physical Exam   Constitutional: She is oriented to person, place, and time. She appears well-developed and well-nourished. No distress.   HENT:   Head: Normocephalic and atraumatic.   Eyes: Conjunctivae are normal. No scleral icterus.   Neck: Neck supple. No JVD present.   Cardiovascular: Normal rate and regular rhythm.  Exam reveals no gallop and no friction rub.    No murmur heard.  Pulmonary/Chest: Effort normal and breath sounds normal. No respiratory distress. She exhibits no tenderness.   Abdominal: Soft. Bowel sounds are normal. She exhibits no distension and no mass. There is no tenderness.   Musculoskeletal: She exhibits no edema or tenderness.   Neurological: She is alert and oriented to person, place, and time. No cranial nerve deficit.   Skin: Skin is dry. She is not diaphoretic. No erythema.   Psychiatric: She has a normal mood and affect. Her behavior is normal.   Nursing note and vitals reviewed.      Labs:        Invalid input(s): [id number]      Recent Labs      04/28/17 2116 04/30/17 0310   SODIUM  132*  137   POTASSIUM  4.0  3.8   CHLORIDE  98  104   CO2  24  27   BUN  9  7*   CREATININE  0.72  0.69   CALCIUM  8.9  8.3*     Recent Labs      04/28/17 2116 04/30/17 0310   ALTSGPT  16  11   ASTSGOT  19  14   ALKPHOSPHAT  108*  91   TBILIRUBIN  0.9  0.6   LIPASE  22   --    GLUCOSE  134*  106*     Recent Labs      04/28/17 2116 04/30/17 0310   RBC  3.69*  3.11*   HEMOGLOBIN  11.0*  9.2*   HEMATOCRIT  33.3*  28.9*   PLATELETCT  372  320   PROTHROMBTM  15.1*   --    APTT  43.2*   --    INR  1.15*   --      Recent Labs      04/28/17 2116 04/30/17   0310   WBC  9.6  6.3   NEUTSPOLYS  83.80*   --    LYMPHOCYTES  7.00*   --    MONOCYTES  8.30   --    EOSINOPHILS  0.30   --    BASOPHILS  0.30   --    ASTSGOT  19  14   ALTSGPT  16  11   ALKPHOSPHAT  108*  91   TBILIRUBIN  0.9  0.6            Hemodynamics:  Temp (24hrs), Av.6 °C (97.9 °F), Min:36.4 °C (97.5 °F), Max:36.9 °C (98.4 °F)  Temperature: 36.9 °C (98.4 °F)  Pulse  Av.3  Min: 79  Max: 118   Blood Pressure: 119/64 mmHg     Respiratory:    Respiration: 18, Pulse Oximetry: 93 %           Fluids:    Intake/Output Summary (Last 24 hours) at 17 1605  Last data filed at 17 0438   Gross per 24 hour   Intake      0 ml   Output    600 ml   Net   -600 ml        GI/Nutrition:  Orders Placed This Encounter   Procedures   • Diet Order     Standing Status: Standing      Number of Occurrences: 1      Standing Expiration Date:      Order Specific Question:  Diet:     Answer:  Diabetic [3]     Medical Decision Making, by Problem:  Active Hospital Problems    Diagnosis   • Anal carcinoma (CMS-HCC) [C21.0]concern for recurrence after treatments with stomach wall thickening and liver lesion     • Intractable abdominal pain [R10.9]better but persisting after resolution of consitpation     • Constipation [K59.00]resolved     • DMII (diabetes mellitus, type 2) (CMS-HCC) [E11.9]diabetic diet, ssi     • HTN (hypertension) [I10]coreg     • HLD (hyperlipidemia) [E78.5]statin       Labs reviewed, Medications reviewed and Radiology images reviewed  Ivan catheter: No Ivan        DVT prophylaxis - mechanical: SCDs

## 2017-05-01 LAB
ANION GAP SERPL CALC-SCNC: 6 MMOL/L (ref 0–11.9)
BASOPHILS # BLD AUTO: 0.1 % (ref 0–1.8)
BASOPHILS # BLD: 0.01 K/UL (ref 0–0.12)
BUN SERPL-MCNC: 6 MG/DL (ref 8–22)
CALCIUM SERPL-MCNC: 8.3 MG/DL (ref 8.5–10.5)
CHLORIDE SERPL-SCNC: 104 MMOL/L (ref 96–112)
CO2 SERPL-SCNC: 26 MMOL/L (ref 20–33)
CREAT SERPL-MCNC: 0.69 MG/DL (ref 0.5–1.4)
EOSINOPHIL # BLD AUTO: 0.1 K/UL (ref 0–0.51)
EOSINOPHIL NFR BLD: 1.5 % (ref 0–6.9)
ERYTHROCYTE [DISTWIDTH] IN BLOOD BY AUTOMATED COUNT: 46.6 FL (ref 35.9–50)
GFR SERPL CREATININE-BSD FRML MDRD: >60 ML/MIN/1.73 M 2
GLUCOSE BLD-MCNC: 103 MG/DL (ref 65–99)
GLUCOSE BLD-MCNC: 117 MG/DL (ref 65–99)
GLUCOSE BLD-MCNC: 117 MG/DL (ref 65–99)
GLUCOSE BLD-MCNC: 142 MG/DL (ref 65–99)
GLUCOSE SERPL-MCNC: 104 MG/DL (ref 65–99)
HCT VFR BLD AUTO: 28.3 % (ref 37–47)
HGB BLD-MCNC: 9.1 G/DL (ref 12–16)
IMM GRANULOCYTES # BLD AUTO: 0.02 K/UL (ref 0–0.11)
IMM GRANULOCYTES NFR BLD AUTO: 0.3 % (ref 0–0.9)
LYMPHOCYTES # BLD AUTO: 0.9 K/UL (ref 1–4.8)
LYMPHOCYTES NFR BLD: 13.2 % (ref 22–41)
MCH RBC QN AUTO: 29.3 PG (ref 27–33)
MCHC RBC AUTO-ENTMCNC: 32.2 G/DL (ref 33.6–35)
MCV RBC AUTO: 91 FL (ref 81.4–97.8)
MONOCYTES # BLD AUTO: 0.74 K/UL (ref 0–0.85)
MONOCYTES NFR BLD AUTO: 10.9 % (ref 0–13.4)
NEUTROPHILS # BLD AUTO: 5.05 K/UL (ref 2–7.15)
NEUTROPHILS NFR BLD: 74 % (ref 44–72)
NRBC # BLD AUTO: 0 K/UL
NRBC BLD AUTO-RTO: 0 /100 WBC
PLATELET # BLD AUTO: 334 K/UL (ref 164–446)
PMV BLD AUTO: 9.1 FL (ref 9–12.9)
POTASSIUM SERPL-SCNC: 3.9 MMOL/L (ref 3.6–5.5)
RBC # BLD AUTO: 3.11 M/UL (ref 4.2–5.4)
SODIUM SERPL-SCNC: 136 MMOL/L (ref 135–145)
WBC # BLD AUTO: 6.8 K/UL (ref 4.8–10.8)

## 2017-05-01 PROCEDURE — 700102 HCHG RX REV CODE 250 W/ 637 OVERRIDE(OP): Performed by: HOSPITALIST

## 2017-05-01 PROCEDURE — A9270 NON-COVERED ITEM OR SERVICE: HCPCS

## 2017-05-01 PROCEDURE — 80048 BASIC METABOLIC PNL TOTAL CA: CPT

## 2017-05-01 PROCEDURE — 85025 COMPLETE CBC W/AUTO DIFF WBC: CPT

## 2017-05-01 PROCEDURE — A9270 NON-COVERED ITEM OR SERVICE: HCPCS | Performed by: HOSPITALIST

## 2017-05-01 PROCEDURE — 700111 HCHG RX REV CODE 636 W/ 250 OVERRIDE (IP): Performed by: HOSPITALIST

## 2017-05-01 PROCEDURE — 700102 HCHG RX REV CODE 250 W/ 637 OVERRIDE(OP)

## 2017-05-01 PROCEDURE — 82962 GLUCOSE BLOOD TEST: CPT | Mod: 91

## 2017-05-01 PROCEDURE — 99233 SBSQ HOSP IP/OBS HIGH 50: CPT | Performed by: INTERNAL MEDICINE

## 2017-05-01 PROCEDURE — 770009 HCHG ROOM/CARE - ONCOLOGY SEMI PRI*

## 2017-05-01 RX ORDER — LACTULOSE 20 G/30ML
30 SOLUTION ORAL 3 TIMES DAILY PRN
Status: DISCONTINUED | OUTPATIENT
Start: 2017-05-01 | End: 2017-05-06 | Stop reason: HOSPADM

## 2017-05-01 RX ADMIN — ATORVASTATIN CALCIUM 20 MG: 20 TABLET, FILM COATED ORAL at 20:17

## 2017-05-01 RX ADMIN — STANDARDIZED SENNA CONCENTRATE AND DOCUSATE SODIUM 2 TABLET: 8.6; 5 TABLET, FILM COATED ORAL at 20:17

## 2017-05-01 RX ADMIN — ENALAPRIL MALEATE 20 MG: 10 TABLET ORAL at 20:18

## 2017-05-01 RX ADMIN — OXYCODONE HYDROCHLORIDE 10 MG: 10 TABLET ORAL at 18:24

## 2017-05-01 RX ADMIN — CARVEDILOL 25 MG: 25 TABLET, FILM COATED ORAL at 08:05

## 2017-05-01 RX ADMIN — OXYCODONE HYDROCHLORIDE 10 MG: 10 TABLET ORAL at 09:30

## 2017-05-01 RX ADMIN — OXYCODONE HYDROCHLORIDE 10 MG: 10 TABLET ORAL at 06:30

## 2017-05-01 RX ADMIN — OXYCODONE HYDROCHLORIDE 10 MG: 10 TABLET ORAL at 13:24

## 2017-05-01 RX ADMIN — HYDROMORPHONE HYDROCHLORIDE 0.5 MG: 1 INJECTION, SOLUTION INTRAMUSCULAR; INTRAVENOUS; SUBCUTANEOUS at 20:13

## 2017-05-01 RX ADMIN — ENALAPRIL MALEATE 20 MG: 10 TABLET ORAL at 08:05

## 2017-05-01 RX ADMIN — OXYCODONE HYDROCHLORIDE 10 MG: 10 TABLET ORAL at 02:40

## 2017-05-01 RX ADMIN — ONDANSETRON 4 MG: 4 TABLET, ORALLY DISINTEGRATING ORAL at 08:05

## 2017-05-01 RX ADMIN — CARVEDILOL 25 MG: 25 TABLET, FILM COATED ORAL at 20:17

## 2017-05-01 RX ADMIN — ASPIRIN 81 MG: 81 TABLET, CHEWABLE ORAL at 08:05

## 2017-05-01 RX ADMIN — STANDARDIZED SENNA CONCENTRATE AND DOCUSATE SODIUM 2 TABLET: 8.6; 5 TABLET, FILM COATED ORAL at 08:05

## 2017-05-01 RX ADMIN — OMEPRAZOLE 20 MG: 20 CAPSULE, DELAYED RELEASE ORAL at 08:05

## 2017-05-01 ASSESSMENT — ENCOUNTER SYMPTOMS
NERVOUS/ANXIOUS: 0
NAUSEA: 0
HEADACHES: 0
DIAPHORESIS: 0
DIARRHEA: 0
FOCAL WEAKNESS: 0
SPEECH CHANGE: 0
DIZZINESS: 0
DEPRESSION: 0
MYALGIAS: 0
BLURRED VISION: 0
ABDOMINAL PAIN: 1
SPUTUM PRODUCTION: 0
CONSTIPATION: 0
FEVER: 0
HEARTBURN: 0
COUGH: 0
PHOTOPHOBIA: 0

## 2017-05-01 ASSESSMENT — PAIN SCALES - GENERAL
PAINLEVEL_OUTOF10: 3
PAINLEVEL_OUTOF10: 10
PAINLEVEL_OUTOF10: 3
PAINLEVEL_OUTOF10: 6
PAINLEVEL_OUTOF10: 1
PAINLEVEL_OUTOF10: 2
PAINLEVEL_OUTOF10: 5
PAINLEVEL_OUTOF10: 8

## 2017-05-01 NOTE — CARE PLAN
Problem: Safety  Goal: Will remain free from injury  Outcome: PROGRESSING AS EXPECTED  Bed in low and locked position, patient steady to bedside commode for self.     Problem: Bowel/Gastric:  Goal: Normal bowel function is maintained or improved  Outcome: PROGRESSING AS EXPECTED  Stool softeners given, patient had BM day before last.

## 2017-05-01 NOTE — CONSULTS
DATE OF SERVICE:  05/01/2017    REFERRING PHYSICIAN:  Kacy Aviles DO    REASON FOR CONSULTATION:  Abnormal CT scan suggesting multiple liver   metastases and thickening of the greater curvature of the gastric wall.    HISTORY OF PRESENT ILLNESS:  This is a pleasant 61-year-old -American   woman who has a history of 3 prior cancers and comes in for evaluation of   abdominal pain.  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  She has had colonoscopy and   flexible sigmoidoscopies by Dr. Dias as part of her evaluation and has a   history of adenomatous colon polyps.  She has never had an upper endoscopy.    Since around October, she has noted abdominal pain, which is primarily in the   lower abdomen which comes and goes.  She describes it as a soreness.    Sometimes it gets worse when she urinates or defecates.  It has been   associated recently with some mild nausea without vomiting.  She denies any   upper abdominal pain, heartburn, indigestion, dysphagia.  She does have a   history of GERD, but it is not bothersome at this time.  Her weight has been   gradually declining.  She came to the hospital because of the abdominal pain   and had abnormalities on CT scan noted below.    PAST MEDICAL HISTORY:  Hypertension, diabetes mellitus, hypercholesterolemia,   obesity, GERD, asthma, chronic pain, breast cancer, cervical cancer, rectal   squamous cell cancer.    ALLERGIES:  None known.    CURRENT MEDICATIONS:  In the hospital include Lipitor, Vasotec, Coreg,   chewable aspirin 81 mg daily, Prilosec 20 mg daily, Humalog insulin, Dilaudid,   Zofran, Phenergan, Compazine, MiraLax, milk of magnesia, and Dulcolax p.r.n.    SOCIAL HISTORY:  She is an ex cigarette smoker (quit 8-9 years ago).  She does   not drink  alcohol.    FAMILY HISTORY:  Negative for cancers.    REVIEW OF SYSTEMS:  As above, otherwise comprehensive review of systems   negative.    PHYSICAL EXAMINATION:  GENERAL:  Reveals a woman who does not appear to be in acute distress.  VITAL SIGNS:  Her temperature is 36.7, pulse 86, respirations 18, blood   pressure 115/59, oxygen saturation 90% on room air.  HEENT:  Normocephalic, atraumatic.  Extraocular movements intact.  Sclerae are   anicteric.  Oral mucosa moist.  Mallampati score 1.  NECK:  No thyromegaly or adenopathy.  LUNGS:  Clear to auscultation.  HEART:  Tones regular rate and rhythm.  S1, S2.  No murmurs.  ABDOMEN:  Mildly obese.  Bowel sounds present.  Diffuse tenderness to   palpation.  No masses or hepatosplenomegaly appreciated.  RECTAL:  Deferred.  EXTREMITIES:  No edema.  NEUROLOGIC:  Alert and oriented.    LABORATORY DATA:  White count 6800, hemoglobin 9.1, hematocrit 28.3, platelet   count 334,000.  The chemistry panel was normal except for mild glucose   elevation.  Her liver tests were normal except for alkaline phosphatase was   slightly up at 108 on April 28, but back down to 91 on April 30th.  Lipase   normal.  INR 1.15, PTT 43.2.  Urinalysis negative.  Biopsies taken at time of   flexible sigmoidoscopy by Dr. Dias on 02/22/2017 was negative for any residual   rectal squamous cell cancer and revealed a hyperplastic polyp at 25 cm.  An   ultrasound of her abdomen on 03/10/2017 was unremarkable, but the CT scan on   04/28/2017 reveals new low attenuation lesions in the liver consistent with   metastatic disease (largest 2 cm in the right hepatic lobe), normal appearing   pancreas, normal appearing gallbladder, and evidence of some soft tissue   density along the greater curvature of the stomach of uncertain significance.    IMPRESSION:  1.  Metastatic disease of the liver.  2.  Focal thickening of the greater curvature of the stomach on CT scan,   possible tumor.  3.  Generalized abdominal  pain and tenderness.  4.  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  5.  History of remote breast and cervical cancer.  6.  History of adenomatous colon polyps.  7.  Other problems include hypertension, diabetes, hypercholesterolemia,   gastroesophageal reflux disease, asthma, and chronic pain.    RECOMMENDATIONS:  1.  Check alpha fetoprotein, CEA, CA 19-9 tumor markers.  2.  Esophagogastroduodenoscopy with monitored anesthesia care tomorrow.  If a   mass is found, it will be biopsied and hopefully give us diagnostic   information.  If unremarkable or biopsies are negative, then she will probably   require biopsy one of the liver lesions to determine tumor type.    Thank you for allowing me to participate in her care.       ____________________________________     ERIN THOMAS MD CMS / KAILASH    DD:  05/01/2017 10:02:35  DT:  05/01/2017 13:16:25    D#:  [id number]  Job#:  [id number]    cc: LEILA WAITE MD

## 2017-05-01 NOTE — PROGRESS NOTES
Received bedside report from night shift RN at 0800. Patient complains of nausea and pain, relieved with medication, see EMAR. Bed in lowest position and locked. Call light within reach. Hourly rounding in place.

## 2017-05-01 NOTE — PROGRESS NOTES
GI (refer to dictated consultation note)    This is a 61 year old woman seen for new liver lesions c/w metastatic disease and apparent thickening of the greater curvature of the stomach.  She has a history of three prior cancers:  - Left breast cancer 1991 treated with mastectomy  - Cervical cancer 1996 treated with hysterectomy  - Rectal squamous cell carcinoma 5/2016 treated with XRT/chemo ending 8/2016.    She complains of generalized lower abdominal pain on and off x 5-6 months which resulted in this admission for w/u.    Impression:  1. Liver metastatic disease.  2. Thickening of greater curvature of the stomach on CT.  3. Generalized abdominal pain and tenderness.  4. Rectal SCCA s/p XRT and chemo ending 8/2016.  5. History of remote breast and cervical cancer.  6. History of adenomatous colon polyps.  7. Other: HTN, DM, hypercholesterolemia, GERD, asthma, chronic pain.    Recommend:  - EGD with monitored anesthesia care tomorrow.  - Check AFP, CEA, CA 19-9.

## 2017-05-01 NOTE — CARE PLAN
Problem: Bowel/Gastric:  Goal: Normal bowel function is maintained or improved  Outcome: PROGRESSING AS EXPECTED  Pt will have adequate stool with out having severe diarrhea        Problem: Pain Management  Goal: Pain level will decrease to patient’s comfort goal  Outcome: PROGRESSING AS EXPECTED  Will have controlled pain levels with use of current POC

## 2017-05-01 NOTE — PROGRESS NOTES
Hospital Medicine Progress Note, Adult, Complex               Author: Kacy Aviles Date & Time created: 5/1/2017  3:22 PM     Interval History:  4/29 Patient troubled with 3-4 months of unexplained abdominal pain, last visit to [facility] showed concerns of new liver lesions and thickening of stomach wall.  She had presented back to Dr Santamaria's and coordination of biopsy was to be done but she had worsening pain again last night and came to ED again.  She is feeling better with the pain medications she has been given here.  She has a huge stool burden seen on CT and this could also be significantly contributing to her pain.  Lactulose scheduled started.  She will also need GI consult for UGI endoscopy for evaluation of her stomach given wall thickening - Dr Dias did a colonoscopy on her in 2/2017 - Will discuss further with GI about getting this done prior to dc once her stool burden has been alleviated.  4/30 Patient feeling better today, had large amount of bowel movements with lactulose overnight.  She states her abdominal pain is better and controlled with oral medications.  Will consult GI tomorrow as constipation resolved and pain still persists.  No acute complaints.  H/h dropped with hydration, no evidence of bleeding.  5/1 Patient up to chair when seen this am, pain about the same but controlled with current PO medications.  Dr Quintero consulted for GI and will take patient to endoscopy tomorrow for biopsies of the stomach lesion.  Dr Alvarez consulted for IP oncology recommendations.  D/w Dr Sue who ordered liver biopsy for further tissue - will hold off on this to see if this is needed after stomach biopsy.    Review of Systems:  Review of Systems   Constitutional: Negative for fever and diaphoresis.   HENT: Negative for congestion.    Eyes: Negative for blurred vision and photophobia.   Respiratory: Negative for cough and sputum production.    Cardiovascular: Negative for chest pain and leg swelling.    Gastrointestinal: Positive for abdominal pain. Negative for heartburn, nausea, diarrhea and constipation.   Genitourinary: Negative for dysuria and hematuria.   Musculoskeletal: Negative for myalgias and joint pain.   Skin: Negative for itching and rash.   Neurological: Negative for dizziness, speech change, focal weakness and headaches.   Psychiatric/Behavioral: Negative for depression. The patient is not nervous/anxious.        Physical Exam:  Physical Exam   Constitutional: She is oriented to person, place, and time. She appears well-developed and well-nourished. No distress.   HENT:   Head: Normocephalic and atraumatic.   Eyes: Conjunctivae are normal. No scleral icterus.   Neck: Neck supple. No JVD present.   Cardiovascular: Normal rate and regular rhythm.  Exam reveals no gallop and no friction rub.    No murmur heard.  Pulmonary/Chest: Effort normal and breath sounds normal. No respiratory distress. She exhibits no tenderness.   Abdominal: Soft. Bowel sounds are normal. She exhibits no distension and no mass. There is no tenderness.   Musculoskeletal: She exhibits no edema or tenderness.   Neurological: She is alert and oriented to person, place, and time. No cranial nerve deficit.   Skin: Skin is dry. She is not diaphoretic. No erythema.   Psychiatric: She has a normal mood and affect. Her behavior is normal.   Nursing note and vitals reviewed.      Labs:        Invalid input(s): [id number]      Recent Labs      04/28/17 2116 04/30/17 0310 05/01/17   0240   SODIUM  132*  137  136   POTASSIUM  4.0  3.8  3.9   CHLORIDE  98  104  104   CO2  24  27  26   BUN  9  7*  6*   CREATININE  0.72  0.69  0.69   CALCIUM  8.9  8.3*  8.3*     Recent Labs      04/28/17 2116 04/30/17 0310  05/01/17   0240   ALTSGPT  16  11   --    ASTSGOT  19  14   --    ALKPHOSPHAT  108*  91   --    TBILIRUBIN  0.9  0.6   --    LIPASE  22   --    --    GLUCOSE  134*  106*  104*     Recent Labs      04/28/17 2116 04/30/17    17   0240   RBC  3.69*  3.11*  3.11*   HEMOGLOBIN  11.0*  9.2*  9.1*   HEMATOCRIT  33.3*  28.9*  28.3*   PLATELETCT  372  320  334   PROTHROMBTM  15.1*   --    --    APTT  43.2*   --    --    INR  1.15*   --    --      Recent Labs      17   2116  17   03117   0240   WBC  9.6  6.3  6.8   NEUTSPOLYS  83.80*   --   74.00*   LYMPHOCYTES  7.00*   --   13.20*   MONOCYTES  8.30   --   10.90   EOSINOPHILS  0.30   --   1.50   BASOPHILS  0.30   --   0.10   ASTSGOT  19  14   --    ALTSGPT  16  11   --    ALKPHOSPHAT  108*  91   --    TBILIRUBIN  0.9  0.6   --            Hemodynamics:  Temp (24hrs), Av.6 °C (97.8 °F), Min:36.1 °C (96.9 °F), Max:37.2 °C (99 °F)  Temperature: 36.7 °C (98 °F)  Pulse  Av.6  Min: 79  Max: 118   Blood Pressure: 115/59 mmHg     Respiratory:    Respiration: 18, Pulse Oximetry: 90 %           Fluids:    Intake/Output Summary (Last 24 hours) at 17 1521  Last data filed at 17 1329   Gross per 24 hour   Intake    976 ml   Output      0 ml   Net    976 ml        GI/Nutrition:  Orders Placed This Encounter   Procedures   • Diet Order     Standing Status: Standing      Number of Occurrences: 1      Standing Expiration Date:      Order Specific Question:  Diet:     Answer:  Diabetic [3]   • DIET NPO     Standing Status: Standing      Number of Occurrences: 8      Standing Expiration Date:      Order Specific Question:  Restrict to:     Answer:  Strict [1]     Medical Decision Making, by Problem:  Active Hospital Problems    Diagnosis   • Anal carcinoma (CMS-HCC) [C21.0]concern for recurrence after treatments with stomach wall thickening and liver lesion, GI and oncology consulted today     • Intractable abdominal pain [R10.9]better but persisting after resolution of consitpation     • Constipation [K59.00]resolved     • DMII (diabetes mellitus, type 2) (CMS-HCC) [E11.9]diabetic diet, ssi     • HTN (hypertension) [I10]coreg     • HLD (hyperlipidemia)  [E78.5]statin       Labs reviewed, Medications reviewed and Radiology images reviewed  Ivan catheter: No Ivan        DVT prophylaxis - mechanical: SCDs

## 2017-05-02 LAB
CANCER AG19-9 SERPL-ACNC: 36.1 U/ML (ref 0–35)
CEA SERPL-MCNC: 1.3 NG/ML (ref 0–3)
GLUCOSE BLD-MCNC: 104 MG/DL (ref 65–99)
GLUCOSE BLD-MCNC: 108 MG/DL (ref 65–99)
GLUCOSE BLD-MCNC: 119 MG/DL (ref 65–99)
GLUCOSE BLD-MCNC: 156 MG/DL (ref 65–99)

## 2017-05-02 PROCEDURE — 700102 HCHG RX REV CODE 250 W/ 637 OVERRIDE(OP): Performed by: HOSPITALIST

## 2017-05-02 PROCEDURE — 160009 HCHG ANES TIME/MIN: Performed by: INTERNAL MEDICINE

## 2017-05-02 PROCEDURE — 160048 HCHG OR STATISTICAL LEVEL 1-5: Performed by: INTERNAL MEDICINE

## 2017-05-02 PROCEDURE — 99232 SBSQ HOSP IP/OBS MODERATE 35: CPT | Performed by: HOSPITALIST

## 2017-05-02 PROCEDURE — 0DB68ZX EXCISION OF STOMACH, VIA NATURAL OR ARTIFICIAL OPENING ENDOSCOPIC, DIAGNOSTIC: ICD-10-PCS | Performed by: INTERNAL MEDICINE

## 2017-05-02 PROCEDURE — 500066 HCHG BITE BLOCK, ECT: Performed by: INTERNAL MEDICINE

## 2017-05-02 PROCEDURE — 700102 HCHG RX REV CODE 250 W/ 637 OVERRIDE(OP): Performed by: INTERNAL MEDICINE

## 2017-05-02 PROCEDURE — A9270 NON-COVERED ITEM OR SERVICE: HCPCS | Performed by: HOSPITALIST

## 2017-05-02 PROCEDURE — 160002 HCHG RECOVERY MINUTES (STAT): Performed by: INTERNAL MEDICINE

## 2017-05-02 PROCEDURE — 88312 SPECIAL STAINS GROUP 1: CPT | Mod: 59

## 2017-05-02 PROCEDURE — 700111 HCHG RX REV CODE 636 W/ 250 OVERRIDE (IP)

## 2017-05-02 PROCEDURE — 82378 CARCINOEMBRYONIC ANTIGEN: CPT

## 2017-05-02 PROCEDURE — 160035 HCHG PACU - 1ST 60 MINS PHASE I: Performed by: INTERNAL MEDICINE

## 2017-05-02 PROCEDURE — 82105 ALPHA-FETOPROTEIN SERUM: CPT

## 2017-05-02 PROCEDURE — 88305 TISSUE EXAM BY PATHOLOGIST: CPT

## 2017-05-02 PROCEDURE — 700111 HCHG RX REV CODE 636 W/ 250 OVERRIDE (IP): Performed by: HOSPITALIST

## 2017-05-02 PROCEDURE — 82962 GLUCOSE BLOOD TEST: CPT

## 2017-05-02 PROCEDURE — 86301 IMMUNOASSAY TUMOR CA 19-9: CPT

## 2017-05-02 PROCEDURE — 502240 HCHG MISC OR SUPPLY RC 0272: Performed by: INTERNAL MEDICINE

## 2017-05-02 PROCEDURE — A9270 NON-COVERED ITEM OR SERVICE: HCPCS | Performed by: INTERNAL MEDICINE

## 2017-05-02 PROCEDURE — 770009 HCHG ROOM/CARE - ONCOLOGY SEMI PRI*

## 2017-05-02 PROCEDURE — 160203 HCHG ENDO MINUTES - 1ST 30 MINS LEVEL 4: Performed by: INTERNAL MEDICINE

## 2017-05-02 RX ADMIN — OXYCODONE HYDROCHLORIDE 10 MG: 10 TABLET ORAL at 06:48

## 2017-05-02 RX ADMIN — LACTULOSE 30 ML: 20 SOLUTION ORAL at 17:52

## 2017-05-02 RX ADMIN — ENALAPRIL MALEATE 20 MG: 10 TABLET ORAL at 19:40

## 2017-05-02 RX ADMIN — OXYCODONE HYDROCHLORIDE 10 MG: 10 TABLET ORAL at 17:52

## 2017-05-02 RX ADMIN — CARVEDILOL 25 MG: 25 TABLET, FILM COATED ORAL at 19:39

## 2017-05-02 RX ADMIN — OXYCODONE HYDROCHLORIDE 10 MG: 10 TABLET ORAL at 15:03

## 2017-05-02 RX ADMIN — ACETAMINOPHEN 650 MG: 325 TABLET, FILM COATED ORAL at 19:39

## 2017-05-02 RX ADMIN — ATORVASTATIN CALCIUM 20 MG: 20 TABLET, FILM COATED ORAL at 19:39

## 2017-05-02 RX ADMIN — OXYCODONE HYDROCHLORIDE 10 MG: 10 TABLET ORAL at 00:29

## 2017-05-02 RX ADMIN — STANDARDIZED SENNA CONCENTRATE AND DOCUSATE SODIUM 2 TABLET: 8.6; 5 TABLET, FILM COATED ORAL at 19:39

## 2017-05-02 RX ADMIN — HYDROMORPHONE HYDROCHLORIDE 0.5 MG: 1 INJECTION, SOLUTION INTRAMUSCULAR; INTRAVENOUS; SUBCUTANEOUS at 09:45

## 2017-05-02 ASSESSMENT — PAIN SCALES - GENERAL
PAINLEVEL_OUTOF10: 4
PAINLEVEL_OUTOF10: 6
PAINLEVEL_OUTOF10: 0
PAINLEVEL_OUTOF10: 6
PAINLEVEL_OUTOF10: 0
PAINLEVEL_OUTOF10: 6
PAINLEVEL_OUTOF10: 4
PAINLEVEL_OUTOF10: 5
PAINLEVEL_OUTOF10: 4
PAINLEVEL_OUTOF10: 4

## 2017-05-02 ASSESSMENT — ENCOUNTER SYMPTOMS
CONSTIPATION: 0
NERVOUS/ANXIOUS: 0
MYALGIAS: 0
COUGH: 0
HEARTBURN: 0
DIZZINESS: 0
ABDOMINAL PAIN: 1
DIAPHORESIS: 0
HEADACHES: 0
DEPRESSION: 0
NAUSEA: 0
FEVER: 0
BLURRED VISION: 0
DIARRHEA: 0

## 2017-05-02 NOTE — PROGRESS NOTES
GI    She is still in pain, otherwise no changes since my evaluation 24 hours ago.    Impression:  1. Liver metastatic disease.  2. Thickening of greater curvature of the stomach on CT.  3. Generalized abdominal pain and tenderness.  4. Rectal SCCA s/p XRT and chemo ending 8/2016.  5. History of remote breast and cervical cancer.  6. History of adenomatous colon polyps.  7. Other: HTN, DM, hypercholesterolemia, GERD, asthma, chronic pain.    Plan:  Proceed with EGD with monitored anesthesia care now.

## 2017-05-02 NOTE — OR NURSING
Respirations easy in PACU. Patient denies pain and nausea.  No problems here in PACU. Report called.

## 2017-05-02 NOTE — PROGRESS NOTES
"Pt reports increased abd pain, rating at 10/10. Oral pain meds not within time frame until 2130. Offered pt IV dilaudid and did accept. Pt was grimacing, and restless. Stating the pain was more \"stabbing\" than before. BS + in all 4 quadrants. Pt had requested lactulose earlier, however, states at this time requests to wait for pain to subside. Denies nausea. Abd is noted to be distended, pt reports is not worse than has been. Hand held call light in place. Pt calls as needed.   "

## 2017-05-02 NOTE — PROCEDURES
DATE OF SERVICE:  05/02/2017    DATE OF PROCEDURE:  05/02/2017    PREPROCEDURE DIAGNOSES:  1.  Metastatic disease to the liver.  2.  Thickening of the greater curvature of the stomach on CT scan.  3.  Generalized abdominal pain and tenderness.    POSTPROCEDURE DIAGNOSES:  1.  Two benign-appearing gastric antral erosions.  2.  Benign appearing prominent erythematous gastric fold in the distal gastric   body greater curvature aspect.    PROCEDURE:  Esophagogastroduodenoscopy with biopsies.    INDICATIONS:  This is a 61-year-old woman with history of left breast cancer   in 1991, treated with mastectomy, cervical cancer in 1996, treated with   hysterectomy and rectal squamous cell carcinoma diagnosed in 2016 and treated   with radiation therapy and chemotherapy.  She presented with generalized   abdominal pain and soreness and on CT scan has what appears to be metastatic   lesions in the liver and focal thickening of the greater curvature of the   stomach.  I was asked to perform EGD to see if I could find a primary lesion.    DESCRIPTION OF PROCEDURE:  The risks, benefits, and alternatives were   explained to the patient and consent obtained.  She was brought to the   operating room suite and a timeout was performed.  She was then placed in the   left lateral decubitus position and was sedated by Dr. Coombs.  The Olympus   flexible video endoscope was introduced in the mouth and gently advanced into   the esophagus under direct visualization.  Examination was carried out to the   second portion of the duodenum.  It included retroflexed views of the fundus   and cardia within the stomach.    FINDINGS:  The esophagus appeared normal throughout its length.  The stomach   had 2 diminutive whitish erosions in the proximal antrum greater curvature   aspect with mild surrounding erythema.  It had a benign appearance.  Biopsies   were taken (specimen A).  The folds in the distal stomach were somewhat   prominent and there was  one fold that was more boggy, erythematous and   prominent than the others.  It was very soft and compliant and had a benign   appearance.  I went ahead and took biopsies of this as well (specimen B).  The   duodenum appeared normal to the distal second portion.    After biopsies, repeat exam revealed no active bleeding.  Air was evacuated   and scope withdrawn.  The patient tolerated the procedure well and was sent to   recovery in stable condition.    IMPRESSION:  I do not see anything that looks like a clear cut malignancy.    She may need to go for needle biopsy of one of the liver lesions for   diagnosis.       ____________________________________     ERIN THOMAS MD CMS / KAILASH    DD:  05/02/2017 11:40:29  DT:  05/02/2017 15:48:12    D#:  [id number]  Job#:  [id number]

## 2017-05-02 NOTE — CARE PLAN
Problem: Bowel/Gastric:  Goal: Normal bowel function is maintained or improved  Outcome: PROGRESSING AS EXPECTED  Last BM was two days ago, will administer stool softeners after procedure.     Problem: Pain Management  Goal: Pain level will decrease to patient’s comfort goal  Outcome: PROGRESSING AS EXPECTED  Medicate patient according to MAR, heat packs provided as well.

## 2017-05-02 NOTE — CARE PLAN
Problem: Bowel/Gastric:  Goal: Normal bowel function is maintained or improved  Outcome: PROGRESSING AS EXPECTED  Will have adequate stool, no diarrhea. Will have no nausea.     Problem: Pain Management  Goal: Pain level will decrease to patient’s comfort goal  Outcome: PROGRESSING SLOWER THAN EXPECTED  Will have improved pain control with use of current poc

## 2017-05-02 NOTE — PROGRESS NOTES
Received report from NOC RN, assumed care of patient at 0700. Patient is awake alert and oriented x 4. Patient currently NPO for procedure at 12. Port infusing tko, brisk blood return present. Patients pain controlled with heat packs at this time. Patient up to chair. POC discussed and she verbalizes understanding. No other needs at this time.

## 2017-05-02 NOTE — PROGRESS NOTES
Hospital Medicine Progress Note, Adult, Complex               Author: Robert Calvillo  Date & Time created: 5/2/2017  2:14 PM     Interval History:  CC: 3-4 months of unexplained abdominal pain  4/29 Patient troubled with 3-4 months of unexplained abdominal pain, last visit to [facility] showed concerns of new liver lesions and thickening of stomach wall.  She had presented back to Dr Santamaria's and coordination of biopsy was to be done but she had worsening pain again last night and came to ED again.  She is feeling better with the pain medications she has been given here.  She has a huge stool burden seen on CT and this could also be significantly contributing to her pain.  Lactulose scheduled started.  She will also need GI consult for UGI endoscopy for evaluation of her stomach given wall thickening - Dr Dias did a colonoscopy on her in 2/2017 - Will discuss further with GI about getting this done prior to dc once her stool burden has been alleviated.  4/30 Patient feeling better today, had large amount of bowel movements with lactulose overnight.  She states her abdominal pain is better and controlled with oral medications.  Will consult GI tomorrow as constipation resolved and pain still persists.  No acute complaints.  H/h dropped with hydration, no evidence of bleeding.  5/1 Patient up to chair when seen this am, pain about the same but controlled with current PO medications.  Dr Quintero consulted for GI and will take patient to endoscopy tomorrow for biopsies of the stomach lesion.  Dr Alvarez consulted for IP oncology recommendations.  D/w Dr Sue who ordered liver biopsy for further tissue - will hold off on this to see if this is needed after stomach biopsy.  5/2 EGD today, biopsy taken. CA 19-9 elev at 36.1. Pt still has some abd pain.     Review of Systems:  Review of Systems   Constitutional: Negative for fever and diaphoresis.   HENT: Negative for congestion.    Eyes: Negative for blurred vision.    Respiratory: Negative for cough.    Cardiovascular: Negative for chest pain.   Gastrointestinal: Positive for abdominal pain. Negative for heartburn, nausea, diarrhea and constipation.   Genitourinary: Negative for dysuria and hematuria.   Musculoskeletal: Negative for myalgias and joint pain.   Neurological: Negative for dizziness and headaches.   Psychiatric/Behavioral: Negative for depression. The patient is not nervous/anxious.        Physical Exam:  Physical Exam   Constitutional: She is oriented to person, place, and time. She appears well-developed and well-nourished. No distress.   HENT:   Head: Normocephalic and atraumatic.   Eyes: Conjunctivae are normal.   Cardiovascular: Normal rate and regular rhythm.  Exam reveals no gallop.    Pulmonary/Chest: Effort normal and breath sounds normal. No respiratory distress. She exhibits no tenderness.   Abdominal: Soft. Bowel sounds are normal. She exhibits no distension. There is tenderness. There is no rebound and no guarding.   Musculoskeletal: She exhibits no edema or tenderness.   Neurological: She is alert and oriented to person, place, and time. No cranial nerve deficit.   Skin: Skin is dry. She is not diaphoretic. No erythema.   Psychiatric: She has a normal mood and affect. Her behavior is normal.   Nursing note and vitals reviewed.      Labs:        Invalid input(s): [id number]      Recent Labs      04/30/17 0310 05/01/17   0240   SODIUM  137  136   POTASSIUM  3.8  3.9   CHLORIDE  104  104   CO2  27  26   BUN  7*  6*   CREATININE  0.69  0.69   CALCIUM  8.3*  8.3*     Recent Labs      04/30/17 0310 05/01/17   0240   ALTSGPT  11   --    ASTSGOT  14   --    ALKPHOSPHAT  91   --    TBILIRUBIN  0.6   --    GLUCOSE  106*  104*     Recent Labs      04/30/17 0310 05/01/17   0240   RBC  3.11*  3.11*   HEMOGLOBIN  9.2*  9.1*   HEMATOCRIT  28.9*  28.3*   PLATELETCT  320  334     Recent Labs      04/30/17 0310 05/01/17   0240   WBC  6.3  6.8    NEUTSPOLYS   --   74.00*   LYMPHOCYTES   --   13.20*   MONOCYTES   --   10.90   EOSINOPHILS   --   1.50   BASOPHILS   --   0.10   ASTSGOT  14   --    ALTSGPT  11   --    ALKPHOSPHAT  91   --    TBILIRUBIN  0.6   --            Hemodynamics:  Temp (24hrs), Av.9 °C (98.4 °F), Min:36.6 °C (97.8 °F), Max:37.3 °C (99.2 °F)  Temperature: 36.6 °C (97.8 °F)  Pulse  Av.8  Min: 79  Max: 118Heart Rate (Monitored): 94  Blood Pressure: 110/58 mmHg, NIBP: 104/51 mmHg     Respiratory:    Respiration: (!) 26, Pulse Oximetry: 100 %           Fluids:    Intake/Output Summary (Last 24 hours) at 17 1414  Last data filed at 17 2200   Gross per 24 hour   Intake   3048 ml   Output    500 ml   Net   2548 ml        GI/Nutrition:  Orders Placed This Encounter   Procedures   • DIET ORDER     Standing Status: Standing      Number of Occurrences: 1      Standing Expiration Date:      Order Specific Question:  Diet:     Answer:  Regular [1]     Medical Decision Making, by Problem:  Active Hospital Problems    Diagnosis   • Anal carcinoma (CMS-HCC) [C21.0]  - concern for recurrence after treatments with stomach wall thickening and liver lesion,   - GI and oncology consulted   - s/p EGD, path PENDING     • Intractable abdominal pain [R10.9]  - better but persisting after resolution of consitpation  - no longer intractable   - possibly 2/2 cancer   • Constipation [K59.00]resolved     • DMII (diabetes mellitus, type 2) (CMS-HCC) [E11.9]diabetic diet, ssi     • HTN (hypertension) [I10]coreg     • HLD (hyperlipidemia) [E78.5]statin       Labs reviewed and Medications reviewed  Ivan catheter: No Ivan        DVT prophylaxis - mechanical: SCDs

## 2017-05-02 NOTE — PROGRESS NOTES
"Pt reports IV pain meds helped reduce \"Stabbing\" pain. Did given oral pain meds at this time. Pt reports continues to have cramping and aching. Denies nausea. Hot pack to abd. Pt does report some relief with that as well. Reminded pt of NPO status. Port remains patent, IV tubing and caps changed. Blood drawn per order. Hand held call light in place. Pt denies further needs at this time.   "

## 2017-05-02 NOTE — OR SURGEON
Immediate Post-Operative Note      PreOp Diagnosis:   1. Liver metastatic disease.  2. Thickening of greater curvature of the stomach on CT.  3. Generalized abdominal pain and tenderness.    PostOp Diagnosis:   1. Two benign appearing gastric antral erosions.  2. Benign appearing prominent erythematous gastric fold, distal body, greater curvature.    Procedure(s):  GASTROSCOPY WITH BIOPSIES - Wound Class: Contaminated    Surgeon(s):  Navin Quintero M.D.    Anesthesiologist/Type of Anesthesia:  Anesthesiologist: Micki Coombs M.D./MAC    Surgical Staff:  Circulator: Cali Fuentes R.N.  Endoscopy Technician: Cara Cardona    Specimen:   A. Gastric antrum (erosions).  B. Gastric body (prominent fold).    Estimated Blood Loss: None    Findings:   - Esophagus: normal.  - Stomach: Two diminutive whitish erosions in the proximal antrum with mild surrounding erythema - benign appearance, biopsied.  Boggy, erythematous, prominent gastric fold in the distal body, greater curvature aspect, benign appearance, biopsied.  - Duodenum.    Complications: None        5/2/2017 11:33 AM Navin Quintero

## 2017-05-03 LAB
AFP-TM SERPL-MCNC: 1 NG/ML (ref 0–9)
ANION GAP SERPL CALC-SCNC: 6 MMOL/L (ref 0–11.9)
BUN SERPL-MCNC: 7 MG/DL (ref 8–22)
CALCIUM SERPL-MCNC: 8.5 MG/DL (ref 8.5–10.5)
CHLORIDE SERPL-SCNC: 99 MMOL/L (ref 96–112)
CO2 SERPL-SCNC: 26 MMOL/L (ref 20–33)
CREAT SERPL-MCNC: 0.71 MG/DL (ref 0.5–1.4)
ERYTHROCYTE [DISTWIDTH] IN BLOOD BY AUTOMATED COUNT: 46.5 FL (ref 35.9–50)
GFR SERPL CREATININE-BSD FRML MDRD: >60 ML/MIN/1.73 M 2
GLUCOSE BLD-MCNC: 147 MG/DL (ref 65–99)
GLUCOSE BLD-MCNC: 158 MG/DL (ref 65–99)
GLUCOSE BLD-MCNC: 166 MG/DL (ref 65–99)
GLUCOSE BLD-MCNC: 199 MG/DL (ref 65–99)
GLUCOSE SERPL-MCNC: 134 MG/DL (ref 65–99)
HCT VFR BLD AUTO: 26.1 % (ref 37–47)
HGB BLD-MCNC: 8.6 G/DL (ref 12–16)
MCH RBC QN AUTO: 29.8 PG (ref 27–33)
MCHC RBC AUTO-ENTMCNC: 33 G/DL (ref 33.6–35)
MCV RBC AUTO: 90.3 FL (ref 81.4–97.8)
PLATELET # BLD AUTO: 381 K/UL (ref 164–446)
PMV BLD AUTO: 9.2 FL (ref 9–12.9)
POTASSIUM SERPL-SCNC: 3.8 MMOL/L (ref 3.6–5.5)
RBC # BLD AUTO: 2.89 M/UL (ref 4.2–5.4)
SODIUM SERPL-SCNC: 131 MMOL/L (ref 135–145)
WBC # BLD AUTO: 8.7 K/UL (ref 4.8–10.8)

## 2017-05-03 PROCEDURE — A9270 NON-COVERED ITEM OR SERVICE: HCPCS

## 2017-05-03 PROCEDURE — 80048 BASIC METABOLIC PNL TOTAL CA: CPT

## 2017-05-03 PROCEDURE — 700102 HCHG RX REV CODE 250 W/ 637 OVERRIDE(OP)

## 2017-05-03 PROCEDURE — 770009 HCHG ROOM/CARE - ONCOLOGY SEMI PRI*

## 2017-05-03 PROCEDURE — A9270 NON-COVERED ITEM OR SERVICE: HCPCS | Performed by: HOSPITALIST

## 2017-05-03 PROCEDURE — 700102 HCHG RX REV CODE 250 W/ 637 OVERRIDE(OP): Performed by: HOSPITALIST

## 2017-05-03 PROCEDURE — 82962 GLUCOSE BLOOD TEST: CPT | Mod: 91

## 2017-05-03 PROCEDURE — 85027 COMPLETE CBC AUTOMATED: CPT

## 2017-05-03 PROCEDURE — 700111 HCHG RX REV CODE 636 W/ 250 OVERRIDE (IP): Performed by: HOSPITALIST

## 2017-05-03 PROCEDURE — 99233 SBSQ HOSP IP/OBS HIGH 50: CPT | Performed by: HOSPITALIST

## 2017-05-03 RX ADMIN — OXYCODONE HYDROCHLORIDE 10 MG: 10 TABLET ORAL at 07:25

## 2017-05-03 RX ADMIN — INSULIN LISPRO 2 UNITS: 100 INJECTION, SOLUTION INTRAVENOUS; SUBCUTANEOUS at 17:57

## 2017-05-03 RX ADMIN — OXYCODONE HYDROCHLORIDE 10 MG: 10 TABLET ORAL at 12:02

## 2017-05-03 RX ADMIN — ENALAPRIL MALEATE 20 MG: 10 TABLET ORAL at 21:07

## 2017-05-03 RX ADMIN — CARVEDILOL 25 MG: 25 TABLET, FILM COATED ORAL at 21:07

## 2017-05-03 RX ADMIN — ASPIRIN 81 MG: 81 TABLET, CHEWABLE ORAL at 08:51

## 2017-05-03 RX ADMIN — STANDARDIZED SENNA CONCENTRATE AND DOCUSATE SODIUM 2 TABLET: 8.6; 5 TABLET, FILM COATED ORAL at 08:51

## 2017-05-03 RX ADMIN — OXYCODONE HYDROCHLORIDE 10 MG: 10 TABLET ORAL at 03:46

## 2017-05-03 RX ADMIN — OXYCODONE HYDROCHLORIDE 10 MG: 10 TABLET ORAL at 14:58

## 2017-05-03 RX ADMIN — ENALAPRIL MALEATE 20 MG: 10 TABLET ORAL at 08:51

## 2017-05-03 RX ADMIN — OMEPRAZOLE 20 MG: 20 CAPSULE, DELAYED RELEASE ORAL at 08:51

## 2017-05-03 RX ADMIN — STANDARDIZED SENNA CONCENTRATE AND DOCUSATE SODIUM 2 TABLET: 8.6; 5 TABLET, FILM COATED ORAL at 21:07

## 2017-05-03 RX ADMIN — INSULIN LISPRO 2 UNITS: 100 INJECTION, SOLUTION INTRAVENOUS; SUBCUTANEOUS at 21:03

## 2017-05-03 RX ADMIN — ACETAMINOPHEN 650 MG: 325 TABLET, FILM COATED ORAL at 03:46

## 2017-05-03 RX ADMIN — ATORVASTATIN CALCIUM 20 MG: 20 TABLET, FILM COATED ORAL at 21:06

## 2017-05-03 RX ADMIN — HYDROMORPHONE HYDROCHLORIDE 0.5 MG: 1 INJECTION, SOLUTION INTRAMUSCULAR; INTRAVENOUS; SUBCUTANEOUS at 13:37

## 2017-05-03 RX ADMIN — OXYCODONE HYDROCHLORIDE 10 MG: 10 TABLET ORAL at 21:07

## 2017-05-03 RX ADMIN — CARVEDILOL 25 MG: 25 TABLET, FILM COATED ORAL at 08:51

## 2017-05-03 ASSESSMENT — PAIN SCALES - GENERAL
PAINLEVEL_OUTOF10: 7
PAINLEVEL_OUTOF10: 8
PAINLEVEL_OUTOF10: 2
PAINLEVEL_OUTOF10: 8
PAINLEVEL_OUTOF10: 4
PAINLEVEL_OUTOF10: 4
PAINLEVEL_OUTOF10: 0
PAINLEVEL_OUTOF10: 5
PAINLEVEL_OUTOF10: 6
PAINLEVEL_OUTOF10: 5

## 2017-05-03 ASSESSMENT — ENCOUNTER SYMPTOMS
COUGH: 0
DEPRESSION: 0
HEARTBURN: 0
BLURRED VISION: 0
CONSTIPATION: 0
MYALGIAS: 0
FEVER: 0
CHILLS: 0
VOMITING: 0
DIZZINESS: 0
NERVOUS/ANXIOUS: 0
FEVER: 1
ROS GI COMMENTS: POOR APPETITE.
DIARRHEA: 0
ABDOMINAL PAIN: 1
HEADACHES: 0
NAUSEA: 0

## 2017-05-03 NOTE — CARE PLAN
Problem: Bowel/Gastric:  Goal: Normal bowel function is maintained or improved  Outcome: PROGRESSING AS EXPECTED  Will have improved bowel movements. Will have no nasuea        Problem: Pain Management  Goal: Pain level will decrease to patient’s comfort goal  Outcome: PROGRESSING AS EXPECTED  Will have controlled abd pain with prescribed pain meds

## 2017-05-03 NOTE — DISCHARGE PLANNING
Care Transition Team Assessment  SW met with pt at bedside to complete assessment.  Pt lives with her sister, Cristina.  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  Pt reports that she has been independent with all of her ADLs and has not required any DME except for her C-pap machine which is supplied through [employer].  Pt plans on discharging home once medically cleared.    Information Source  Orientation : Oriented x 4  Information Given By: Patient  Informant's Name:  (Vera Strange)  Who is responsible for making decisions for patient? : Patient    Readmission Evaluation  Is this a readmission?: No    Elopement Risk  Legal Hold: No  Ambulatory or Self Mobile in Wheelchair: Yes  Disoriented: No  Psychiatric Symptoms: None  History of Wandering: No  Elopement this Admit: No  Vocalizing Wanting to Leave: No  Displays Behaviors, Body Language Wanting to Leave: No-Not at Risk for Elopement  Elopement Risk: Not at Risk for Elopement    Interdisciplinary Discharge Planning  Does Admitting Nurse Feel This Could be a Complex Discharge?: No  Primary Care Physician: Clara Noble at [facility]  Lives with - Patient's Self Care Capacity: Other (Comments) (sister)  Support Systems: Family Member(s)  Housing / Facility: [address]  Do You Take your Prescribed Medications Regularly: Yes  Able to Return to Previous ADL's: Yes  Mobility Issues: No  Prior Services: None  Patient Expects to be Discharged to:: home  Assistance Needed: No  Durable Medical Equipment:  (has a C-pap at home)  DME Provider / Phone:  ([employer])    Discharge Preparedness  What is your plan after discharge?: Home with help  What are your discharge supports?:  (sister)  Prior Functional Level: Ambulatory, Independent with Activities of Daily Living  Difficulity with ADLs: None  Difficulity with IADLs: None    Functional Assesment  Prior Functional Level: Ambulatory, Independent with Activities of Daily Living    Finances  Financial  Barriers to Discharge: No  Prescription Coverage: Yes ([employer] pays 100% of meds. Uses [facility] on [facility])    Vision / Hearing Impairment  Vision Impairment : Yes  Right Eye Vision: Impaired, Wears Glasses  Left Eye Vision: Impaired, Wears Glasses  Hearing Impairment : No    Values / Beliefs / Concerns  Values / Beliefs Concerns : No    Advance Directive  Advance Directive?: None    Domestic Abuse  Have you ever been the victim of abuse or violence?: No  Physical Abuse or Sexual Abuse: No  Verbal Abuse or Emotional Abuse: No  Possible Abuse Reported to:: Not Applicable    Psychological Assessment  History of Substance Abuse: None  History of Psychiatric Problems: No  Non-compliant with Treatment: No  Newly Diagnosed Illness: Yes    Discharge Risks or Barriers  Discharge risks or barriers?: Complex medical needs  Patient risk factors: Complex medical needs    Anticipated Discharge Information  Anticipated discharge disposition: Home  Discharge Address:  ([address] [place] [address] [place] [id number])  Discharge Contact Phone Number:  ([phone])

## 2017-05-03 NOTE — CARE PLAN
Problem: Bowel/Gastric:  Goal: Normal bowel function is maintained or improved  Outcome: PROGRESSING AS EXPECTED  Patient had bowel movement after lactulose.

## 2017-05-03 NOTE — PROGRESS NOTES
Gastroenterology Progress Note     Author: Navin DEEPA Quintero   Date & Time Created: 5/3/2017 8:39 AM    Problem: Abnormal CT with liver lesions c/w metastatic disease and focal thickening of gastric wall    Interval History:  2017 - EGD revealed two benign-appearing antral erosions and benign appearing mild thickening of gastric folds.  Bx'd each.  5/3/2017 - No new symptoms.  Pathology pending.    Review of Systems:  Review of Systems   Constitutional: Positive for fever (Low grade last night w/o symptoms). Negative for chills.   Gastrointestinal: Positive for abdominal pain (Fairly generalized.). Negative for nausea, vomiting and diarrhea.        Poor appetite.       Physical Exam:  Physical Exam   Constitutional: She is oriented to person, place, and time. No distress.   Cardiovascular: Regular rhythm.    No murmur heard.  Pulmonary/Chest: Breath sounds normal. She has no wheezes. She has no rales.   Abdominal: Soft. Bowel sounds are normal. She exhibits no mass. There is tenderness (Mild generalized.).   Neurological: She is alert and oriented to person, place, and time.       Labs:        Invalid input(s): [id number]      Recent Labs      17   SODIUM  136  131*   POTASSIUM  3.9  3.8   CHLORIDE  104  99   CO2  26  26   BUN  6*  7*   CREATININE  0.69  0.71   CALCIUM  8.3*  8.5     Recent Labs      17   035   GLUCOSE  104*  134*     Recent Labs      17   0350   RBC  3.11*  2.89*   HEMOGLOBIN  9.1*  8.6*   HEMATOCRIT  28.3*  26.1*   PLATELETCT  334  381     Recent Labs      17   WBC  6.8  8.7   NEUTSPOLYS  74.00*   --    LYMPHOCYTES  13.20*   --    MONOCYTES  10.90   --    EOSINOPHILS  1.50   --    BASOPHILS  0.10   --      Hemodynamics:  Temp (24hrs), Av.2 °C (98.9 °F), Min:36.6 °C (97.8 °F), Max:38.1 °C (100.6 °F)  Temperature: 36.7 °C (98.1 °F)  Pulse  Av.5  Min: 79  Max: 118Heart Rate  (Monitored): 94  Blood Pressure: 121/56 mmHg, NIBP: 104/51 mmHg     Respiratory:    Respiration: 18, Pulse Oximetry: 90 %           Fluids:    Intake/Output Summary (Last 24 hours) at 05/03/17 0839  Last data filed at 05/02/17 1930   Gross per 24 hour   Intake     50 ml   Output      0 ml   Net     50 ml        GI/Nutrition:  Orders Placed This Encounter   Procedures   • DIET ORDER     Standing Status: Standing      Number of Occurrences: 1      Standing Expiration Date:      Order Specific Question:  Diet:     Answer:  Regular [1]     Medical Decision Making, by Problem:  Active Hospital Problems    Diagnosis   • Anal carcinoma (CMS-HCC) [C21.0]   • Intractable abdominal pain [R10.9]   • Constipation [K59.00]   • DMII (diabetes mellitus, type 2) (CMS-HCC) [E11.9]   • HTN (hypertension) [I10]   • HLD (hyperlipidemia) [E78.5]     IMPRESSION:  1.  Abnormal CT: Metastatic disease of the liver.  2.  Focal thickening of the greater curvature of the stomach on CT scan,    possible tumor.  EGD didn't suggest a malignant process, but pathology is pending.  3.  Generalized abdominal pain and tenderness.  4.  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  5.  History of remote breast and cervical cancer.  6.  History of adenomatous colon polyps.  7.  Other problems include hypertension, diabetes, hypercholesterolemia,    gastroesophageal reflux disease, asthma, and chronic pain.    Plan:  Await pathology from EGD biopsies.  If negative, then we should proceed with liver lesion biopsy.  Dr. Arun Dias will take over service tomorrow.    Labs reviewed and Medications reviewed

## 2017-05-03 NOTE — PROGRESS NOTES
Received report from NOC RN, assumed care of patient at 0700. Patient is awake alert and oriented x 4. She is up self to bed side commode, any farther patient calls for assistance. Patient has pain in the abdomen, medication given for pain as well as heat packs. Patient updated on plan of care. Call light within reach, bed in low and locked position.

## 2017-05-03 NOTE — PROGRESS NOTES
Hospital Medicine Progress Note, Adult, Complex               Author: Robert Calvillo  Date & Time created: 5/3/2017  1:49 PM     Interval History:  CC: 3-4 months of unexplained abdominal pain  4/29 Patient troubled with 3-4 months of unexplained abdominal pain, last visit to [facility] showed concerns of new liver lesions and thickening of stomach wall.  She had presented back to Dr Santamaria's and coordination of biopsy was to be done but she had worsening pain again last night and came to ED again.  She is feeling better with the pain medications she has been given here.  She has a huge stool burden seen on CT and this could also be significantly contributing to her pain.  Lactulose scheduled started.  She will also need GI consult for UGI endoscopy for evaluation of her stomach given wall thickening - Dr Dias did a colonoscopy on her in 2/2017 - Will discuss further with GI about getting this done prior to dc once her stool burden has been alleviated.  4/30 Patient feeling better today, had large amount of bowel movements with lactulose overnight.  She states her abdominal pain is better and controlled with oral medications.  Will consult GI tomorrow as constipation resolved and pain still persists.  No acute complaints.  H/h dropped with hydration, no evidence of bleeding.  5/1 Patient up to chair when seen this am, pain about the same but controlled with current PO medications.  Dr Quintero consulted for GI and will take patient to endoscopy tomorrow for biopsies of the stomach lesion.  Dr Alvarez consulted for IP oncology recommendations.  D/w Dr Sue who ordered liver biopsy for further tissue - will hold off on this to see if this is needed after stomach biopsy.  5/2 EGD today, biopsy taken. CA 19-9 elev at 36.1. Pt still has some abd pain.   5/3 Fever overnight, resolved. Had BM in AM. Path still pending, but possibly will be negative. NPO midnight in case pt needs liver bx tomm.     Review of Systems:  Review of  Systems   Constitutional: Negative for fever.   HENT: Negative for congestion.    Eyes: Negative for blurred vision.   Respiratory: Negative for cough.    Cardiovascular: Negative for chest pain.   Gastrointestinal: Positive for abdominal pain. Negative for heartburn, nausea, diarrhea and constipation.   Genitourinary: Negative for dysuria.   Musculoskeletal: Negative for myalgias.   Neurological: Negative for dizziness and headaches.   Psychiatric/Behavioral: Negative for depression. The patient is not nervous/anxious.        Physical Exam:  Physical Exam   Constitutional: She is oriented to person, place, and time. She appears well-developed and well-nourished. No distress.   HENT:   Head: Normocephalic.   Eyes: Conjunctivae are normal.   Cardiovascular: Normal rate and regular rhythm.  Exam reveals no gallop.    Pulmonary/Chest: Effort normal and breath sounds normal. No respiratory distress.   Abdominal: Soft. Bowel sounds are normal. She exhibits no distension. There is tenderness. There is no rebound and no guarding.   Musculoskeletal: She exhibits no edema or tenderness.   Neurological: She is alert and oriented to person, place, and time. No cranial nerve deficit.   Skin: Skin is dry. She is not diaphoretic. No erythema.   Psychiatric: She has a normal mood and affect. Her behavior is normal.   Nursing note and vitals reviewed.      Labs:        Invalid input(s): [id number]      Recent Labs      05/01/17 0240 05/03/17   0350   SODIUM  136  131*   POTASSIUM  3.9  3.8   CHLORIDE  104  99   CO2  26  26   BUN  6*  7*   CREATININE  0.69  0.71   CALCIUM  8.3*  8.5     Recent Labs      05/01/17 0240 05/03/17   0350   GLUCOSE  104*  134*     Recent Labs      05/01/17 0240 05/03/17   0350   RBC  3.11*  2.89*   HEMOGLOBIN  9.1*  8.6*   HEMATOCRIT  28.3*  26.1*   PLATELETCT  334  381     Recent Labs      05/01/17 0240 05/03/17   0350   WBC  6.8  8.7   NEUTSPOLYS  74.00*   --    LYMPHOCYTES  13.20*   --     MONOCYTES  10.90   --    EOSINOPHILS  1.50   --    BASOPHILS  0.10   --            Hemodynamics:  Temp (24hrs), Av.2 °C (99 °F), Min:36.6 °C (97.9 °F), Max:38.1 °C (100.6 °F)  Temperature: 36.8 °C (98.2 °F)  Pulse  Av.1  Min: 79  Max: 118   Blood Pressure: 105/57 mmHg     Respiratory:    Respiration: 18, Pulse Oximetry: 91 %           Fluids:    Intake/Output Summary (Last 24 hours) at 17 1349  Last data filed at 17 1930   Gross per 24 hour   Intake     50 ml   Output      0 ml   Net     50 ml        GI/Nutrition:  Orders Placed This Encounter   Procedures   • DIET ORDER     Standing Status: Standing      Number of Occurrences: 1      Standing Expiration Date:      Order Specific Question:  Diet:     Answer:  Regular [1]   • DIET NPO     Standing Status: Standing      Number of Occurrences: 8      Standing Expiration Date:      Order Specific Question:  Restrict to:     Answer:  Sips with Medications [3]     Medical Decision Making, by Problem:  Active Hospital Problems    Diagnosis   • Anal carcinoma (CMS-HCC) [C21.0]  - concern for recurrence after treatments with stomach wall thickening and liver lesion,   - GI and oncology consulted   - s/p EGD, path PENDING  - if negative, will need liver biopsy    • Intractable abdominal pain [R10.9]  - better but persisting after resolution of consitpation  - no longer intractable   - possibly 2/2 cancer   • Constipation [K59.00]  - resolved  - had BM 5/3/2017   • DMII (diabetes mellitus, type 2) (CMS-HCC) [E11.9]  - diabetic diet, ssi     • HTN (hypertension) [I10]coreg     • HLD (hyperlipidemia) [E78.5]statin       Labs reviewed and Medications reviewed  Ivan catheter: No Ivan        DVT prophylaxis - mechanical: SCDs

## 2017-05-03 NOTE — CONSULTS
DATE OF SERVICE:  05/03/2017    INPATIENT ONCOLOGY CONSULTATION    DATE OF CONSULTATION:  5/3/2017    REASON FOR CONSULT:  Possible recurrent metastatic cancer.    HISTORY OF PRESENT ILLNESS:  The patient is a very nice 61-year-old woman with   a striking personal history of cancer.  She had a left breast cancer in 1991,   treated with mastectomy only.  She had a uterine/cervical cancer treated with   hysterectomy in 1996.  She also has a history of hypertension, diabetes,   coronary artery disease, GERD, hyperlipidemia, and asthma.  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  This was diagnosed   roughly in June of 2016.  She established care with my partner, Dr. Alfa Sue, as well as Dr. Jose Raul Magdaleno.  She was treated with standard of   care, chemo and radiation starting in July of 2016.  Her chemo was the   standard 5-FU and mitomycin chemo.  All of her therapy finished on 8/30/2016.    The patient did pretty well after finishing the treatments.  She continued to   make some recovery, but per her report about 2 months after finishing, she   started having trouble with troublesome abdominal pain.  It was not in any one   area, but tended to be somewhat diffuse.  She also had an increase in pain   whenever she tried to have a bowel movement.  She underwent further workup   including CT scans in December as well as in January, all of which were   negative other than treatment related changes.  She actually underwent a   flexible sigmoidoscopy in February of 2017 also, which did find 1 polyp which   was removed and found to be benign.    Her abdominal pain has continued to persist and was becoming worse.  She   actually presented to the emergency room at [facility] on April 26   because of this pain.  They did a CT scan which unfortunately showed multiple   hepatic hypodense lesions consistent  with metastatic masses.  The largest   measured 1.7 cm.  They also described some thickening of the peritoneum in the   right colic gutter area suspicious for carcinomatosis.    She saw Dr. Sue on April 28th, and he discussed the findings.  He tried to   increase her pain medication to help control her pain better.  He talked about   doing a possible liver biopsy to see if this was a recurrent anal squamous   cell carcinoma, and he was working on getting that setup.    Unfortunately, the patient continued to have trouble with pain prompting her   to come to the [facility] Emergency Room on the evening of April 28th for further   pain control.  She had a CT scan of the abdomen and pelvis here at [facility]   which again redemonstrated the new liver lesions consistent with metastasis,   measuring up to 2.0 cm.  There was some fluid or soft tissue density along the   greater curvature of the stomach.  There was some stranding in the mesentery   with some trace ascites.  There was a stable 1.5 cm right adrenal nodule which   was nonspecific.    The patient was admitted to the hospital for pain control and further workup.    She had a consult with Dr. Quintero of [facility].  On 5/2/2017, he   performed an EGD.  She did have 2 benign appearing gastric antral erosions   which were biopsied.  She also had a benign appearing prominent gastric fold   in the distal body/greater curvature of the stomach which did not appear   malignant, but was biopsied as well.  The pathology is still pending from   these areas.    We have been asked to consult on her case given her history.    PAST MEDICAL HISTORY:  1.  Anal squamous cell carcinoma.  2.  Left breast cancer -- -- 1991, treated with mastectomy.  3.  Uterine/cervical cancer -- -- treated with hysterectomy in 1996.  4.  Hypertension.  5.  Hyperlipidemia.  6.  Diabetes.  7.  Coronary artery disease.  8.  GERD.  9.  Arthritis.  10.  Hyperlipidemia.  11.  Asthma.    PAST SURGICAL  HISTORY:  1.  Hysterectomy -- -- 1996.  2.  Left mastectomy -- -- 1991.  3.  Left knee surgery.    ALLERGIES:  No known drug allergies.    HOME MEDICATIONS:  Breo Ellipta inhaler, oxycodone 5 mg p.o. q. 8 hours p.r.n.   pain, Vasotec, Coreg, metformin, Protonix, Lipitor, calcium, vitamin D, and   aspirin 81 mg daily.    FAMILY HISTORY:  She has no family history of any cancers.    SOCIAL HISTORY:  She was born in [place].  She currently lives in [place] with   her sister.  She is  and has 5 children.  She is a retired CNA.  She   was a former smoker with a 10-pack-year history, but quit in 2009.  She does   not drink any alcohol.    REVIEW OF SYSTEMS:  Review of systems is positive for the illness listed above   as well as complaints of fatigue and weight loss.  She has had shortness of   breath for the past 4 or 5 years, which she thinks may be related to her new   diagnosis of asthma.  She does have occasional heartburn as well as   constipation.  She has had no blood in the stool or black tarry stool.  The   only pain she has is the abdominal pain.  Otherwise, a complete review of   systems per the AMA and CMS criteria was negative.    PHYSICAL EXAMINATION:  VITAL SIGNS:  Her T-max is 100.6, pulse of 86, blood pressure 121/56,   respirations 18, and satting 90% on room air.  GENERAL:  No acute distress, pleasant woman, appears stated age, somewhat   fatigued appearing.  HEENT:  NCAT.  Sclerae are anicteric.  Conjunctivae clear.  Oropharynx is   clear without any erythema, exudate, or discharge.  NECK:  Supple, nontender, no elevated JVP, no carotid bruits, no thyromegaly.  CHEST:  Clear to auscultation and percussion bilaterally with some decreased   breath sounds.  CARDIOVASCULAR SYSTEM:  Regular rate and rhythm.  No gallops or rubs.  She has   2/6 systolic murmur heard best at the left upper sternal border.  ABDOMEN:  Soft.  No guarding or rebound, but diffusely tender throughout.    Difficult to  appreciate organomegaly or masses.  She has normoactive bowel   sounds.  LYMPH NODES:  No cervical, supraclavicular, infraclavicular, axillary, or   inguinal lymphadenopathy.  EXTREMITIES:  No cyanosis, clubbing, or edema.  Full range of motion.  No   joint swelling.  SKIN:  No rashes, bruising, petechiae, ulcerations, or nonhealing wounds.  NEUROLOGIC:  Cranial nerves II-XII are intact.  She has intact sensation to   light touch throughout.  She moves all 4 extremities appropriately.  She is   alert and oriented x3.    LABORATORY DATA:  White blood count 8.7, hemoglobin 8.6, hematocrit 26.1%, and   platelets 381.  Sodium 131, potassium 3.8, chloride 99, CO2 of 26, BUN 7,   creatinine 0.71, glucose 134, and calcium 8.5.  AST 14, ALT 11, alkaline   phosphatase 91, bilirubin 0.6, albumin 3, and protein 6.2.    ASSESSMENT:  The patient is a very nice 61-year-old woman with the medical   history listed above as well as a past history of breast cancer and uterine   cancer who recently was diagnosed with a stage III anal squamous cell   carcinoma with bilateral inguinal lymph node involvement (T2 N3 N0).  She was   treated with combined chemotherapy and radiation, finishing in 8/30/2016.  She   was felt to have no evidence of disease.  She has had abdominal pain starting   about 2 months after treatment which has been progressive and diffuse.    Recent CT scans have shown possible metastatic lesions in the liver.  We have   been asked to consult on the case.    At this point, she has had the upper esophagogastroduodenoscopy procedure.    There were some benign changes as described, but nothing that appeared   malignant.  These areas were biopsied.  We are awaiting the biopsy results.    The current plan is if those biopsy results are negative, then she would need   to proceed with a CT-guided biopsy of one of the liver lesions.  I would   concur with this plan.  My guess is the endoscopy evaluation will be benign,   and  we will need to do a CT-guided biopsy of one of the liver lesions.    At this point, we will move forward and help along the case.  Once we get   pathologic confirmation of what is going on, we can then talk about further   treatment arrangements.    In terms of her pain, her pain is under better control currently.  We will   have to monitor and continue to adjust the medicines to keep her pain under   good control.  Currently, she just has short acting p.r.n. medications.  She may benefit   from a long acting pain medicine depending on how much short acting she is   using.    She does have some anemia with hemoglobin of 8.6.  This does appear to be   normocytic.  I will check iron parameters to see if we can find any other   underlying cause for the anemia.  It does not seem to be related to kidney or   liver disease.  It could be some anemia of chronic disease.  It could be   somewhat therapy related, although she was not anemic in February and now is   more anemic.    Thank you very much for referral of this nice woman.  I will continue to   follow along in her case.       ____________________________________     MD NORMA LAMA / NTS    DD:  05/03/2017 12:47:39  DT:  05/03/2017 16:25:26    D#:  [id number]  Job#:  [id number]

## 2017-05-04 ENCOUNTER — APPOINTMENT (OUTPATIENT)
Dept: RADIOLOGY | Facility: MEDICAL CENTER | Age: 62
DRG: 948 | End: 2017-05-04
Attending: HOSPITALIST
Payer: MEDICAID

## 2017-05-04 LAB
ANION GAP SERPL CALC-SCNC: 7 MMOL/L (ref 0–11.9)
BUN SERPL-MCNC: 6 MG/DL (ref 8–22)
CALCIUM SERPL-MCNC: 8.2 MG/DL (ref 8.5–10.5)
CHLORIDE SERPL-SCNC: 99 MMOL/L (ref 96–112)
CO2 SERPL-SCNC: 26 MMOL/L (ref 20–33)
CREAT SERPL-MCNC: 0.67 MG/DL (ref 0.5–1.4)
ERYTHROCYTE [DISTWIDTH] IN BLOOD BY AUTOMATED COUNT: 46.7 FL (ref 35.9–50)
FERRITIN SERPL-MCNC: 582.3 NG/ML (ref 10–291)
GFR SERPL CREATININE-BSD FRML MDRD: >60 ML/MIN/1.73 M 2
GLUCOSE BLD-MCNC: 118 MG/DL (ref 65–99)
GLUCOSE BLD-MCNC: 122 MG/DL (ref 65–99)
GLUCOSE BLD-MCNC: 123 MG/DL (ref 65–99)
GLUCOSE BLD-MCNC: 133 MG/DL (ref 65–99)
GLUCOSE SERPL-MCNC: 126 MG/DL (ref 65–99)
HCT VFR BLD AUTO: 25.2 % (ref 37–47)
HGB BLD-MCNC: 8.2 G/DL (ref 12–16)
IRON SATN MFR SERPL: 6 % (ref 15–55)
IRON SERPL-MCNC: 10 UG/DL (ref 40–170)
MCH RBC QN AUTO: 29.3 PG (ref 27–33)
MCHC RBC AUTO-ENTMCNC: 32.5 G/DL (ref 33.6–35)
MCV RBC AUTO: 90 FL (ref 81.4–97.8)
PLATELET # BLD AUTO: 411 K/UL (ref 164–446)
PMV BLD AUTO: 9.5 FL (ref 9–12.9)
POTASSIUM SERPL-SCNC: 3.5 MMOL/L (ref 3.6–5.5)
RBC # BLD AUTO: 2.8 M/UL (ref 4.2–5.4)
SODIUM SERPL-SCNC: 132 MMOL/L (ref 135–145)
TIBC SERPL-MCNC: 169 UG/DL (ref 250–450)
WBC # BLD AUTO: 7.5 K/UL (ref 4.8–10.8)

## 2017-05-04 PROCEDURE — 99233 SBSQ HOSP IP/OBS HIGH 50: CPT | Performed by: HOSPITALIST

## 2017-05-04 PROCEDURE — 700111 HCHG RX REV CODE 636 W/ 250 OVERRIDE (IP): Performed by: HOSPITALIST

## 2017-05-04 PROCEDURE — 88342 IMHCHEM/IMCYTCHM 1ST ANTB: CPT

## 2017-05-04 PROCEDURE — 0FB03ZX EXCISION OF LIVER, PERCUTANEOUS APPROACH, DIAGNOSTIC: ICD-10-PCS | Performed by: RADIOLOGY

## 2017-05-04 PROCEDURE — 85027 COMPLETE CBC AUTOMATED: CPT

## 2017-05-04 PROCEDURE — 700111 HCHG RX REV CODE 636 W/ 250 OVERRIDE (IP): Performed by: RADIOLOGY

## 2017-05-04 PROCEDURE — A9270 NON-COVERED ITEM OR SERVICE: HCPCS | Performed by: HOSPITALIST

## 2017-05-04 PROCEDURE — 47000 NEEDLE BIOPSY OF LIVER PERQ: CPT

## 2017-05-04 PROCEDURE — 82728 ASSAY OF FERRITIN: CPT

## 2017-05-04 PROCEDURE — 700101 HCHG RX REV CODE 250

## 2017-05-04 PROCEDURE — 80048 BASIC METABOLIC PNL TOTAL CA: CPT

## 2017-05-04 PROCEDURE — 700102 HCHG RX REV CODE 250 W/ 637 OVERRIDE(OP): Performed by: HOSPITALIST

## 2017-05-04 PROCEDURE — 82962 GLUCOSE BLOOD TEST: CPT | Mod: 91

## 2017-05-04 PROCEDURE — 88307 TISSUE EXAM BY PATHOLOGIST: CPT

## 2017-05-04 PROCEDURE — 700102 HCHG RX REV CODE 250 W/ 637 OVERRIDE(OP)

## 2017-05-04 PROCEDURE — 770009 HCHG ROOM/CARE - ONCOLOGY SEMI PRI*

## 2017-05-04 PROCEDURE — 700111 HCHG RX REV CODE 636 W/ 250 OVERRIDE (IP)

## 2017-05-04 PROCEDURE — 88341 IMHCHEM/IMCYTCHM EA ADD ANTB: CPT | Mod: 91

## 2017-05-04 PROCEDURE — A9270 NON-COVERED ITEM OR SERVICE: HCPCS

## 2017-05-04 PROCEDURE — 83550 IRON BINDING TEST: CPT

## 2017-05-04 PROCEDURE — 83540 ASSAY OF IRON: CPT

## 2017-05-04 RX ORDER — NALOXONE HYDROCHLORIDE 0.4 MG/ML
INJECTION, SOLUTION INTRAMUSCULAR; INTRAVENOUS; SUBCUTANEOUS
Status: DISPENSED
Start: 2017-05-04 | End: 2017-05-04

## 2017-05-04 RX ORDER — MIDAZOLAM HYDROCHLORIDE 1 MG/ML
INJECTION INTRAMUSCULAR; INTRAVENOUS
Status: COMPLETED
Start: 2017-05-04 | End: 2017-05-04

## 2017-05-04 RX ORDER — SODIUM CHLORIDE 9 MG/ML
500 INJECTION, SOLUTION INTRAVENOUS PRN
Status: DISCONTINUED | OUTPATIENT
Start: 2017-05-04 | End: 2017-05-06 | Stop reason: HOSPADM

## 2017-05-04 RX ORDER — FLUMAZENIL 0.1 MG/ML
INJECTION INTRAVENOUS
Status: COMPLETED
Start: 2017-05-04 | End: 2017-05-04

## 2017-05-04 RX ORDER — ONDANSETRON 2 MG/ML
4 INJECTION INTRAMUSCULAR; INTRAVENOUS PRN
Status: ACTIVE | OUTPATIENT
Start: 2017-05-04 | End: 2017-05-04

## 2017-05-04 RX ORDER — MIDAZOLAM HYDROCHLORIDE 1 MG/ML
.5-2 INJECTION INTRAMUSCULAR; INTRAVENOUS PRN
Status: ACTIVE | OUTPATIENT
Start: 2017-05-04 | End: 2017-05-04

## 2017-05-04 RX ADMIN — FENTANYL CITRATE 25 MCG: 50 INJECTION, SOLUTION INTRAMUSCULAR; INTRAVENOUS at 10:52

## 2017-05-04 RX ADMIN — HYDROMORPHONE HYDROCHLORIDE 0.5 MG: 1 INJECTION, SOLUTION INTRAMUSCULAR; INTRAVENOUS; SUBCUTANEOUS at 11:30

## 2017-05-04 RX ADMIN — STANDARDIZED SENNA CONCENTRATE AND DOCUSATE SODIUM 2 TABLET: 8.6; 5 TABLET, FILM COATED ORAL at 08:42

## 2017-05-04 RX ADMIN — CARVEDILOL 25 MG: 25 TABLET, FILM COATED ORAL at 08:42

## 2017-05-04 RX ADMIN — OXYCODONE HYDROCHLORIDE 10 MG: 10 TABLET ORAL at 18:38

## 2017-05-04 RX ADMIN — OXYCODONE HYDROCHLORIDE 10 MG: 10 TABLET ORAL at 14:35

## 2017-05-04 RX ADMIN — STANDARDIZED SENNA CONCENTRATE AND DOCUSATE SODIUM 2 TABLET: 8.6; 5 TABLET, FILM COATED ORAL at 20:38

## 2017-05-04 RX ADMIN — CARVEDILOL 25 MG: 25 TABLET, FILM COATED ORAL at 20:38

## 2017-05-04 RX ADMIN — FENTANYL CITRATE 50 MCG: 50 INJECTION, SOLUTION INTRAMUSCULAR; INTRAVENOUS at 10:32

## 2017-05-04 RX ADMIN — ENALAPRIL MALEATE 20 MG: 10 TABLET ORAL at 08:42

## 2017-05-04 RX ADMIN — OXYCODONE HYDROCHLORIDE 10 MG: 10 TABLET ORAL at 01:40

## 2017-05-04 RX ADMIN — HYDROMORPHONE HYDROCHLORIDE 0.5 MG: 1 INJECTION, SOLUTION INTRAMUSCULAR; INTRAVENOUS; SUBCUTANEOUS at 07:19

## 2017-05-04 RX ADMIN — MIDAZOLAM HYDROCHLORIDE 1 MG: 1 INJECTION, SOLUTION INTRAMUSCULAR; INTRAVENOUS at 10:52

## 2017-05-04 RX ADMIN — OXYCODONE HYDROCHLORIDE 10 MG: 10 TABLET ORAL at 22:08

## 2017-05-04 RX ADMIN — ATORVASTATIN CALCIUM 20 MG: 20 TABLET, FILM COATED ORAL at 20:38

## 2017-05-04 RX ADMIN — ENALAPRIL MALEATE 20 MG: 10 TABLET ORAL at 20:38

## 2017-05-04 RX ADMIN — FENTANYL CITRATE 25 MCG: 50 INJECTION, SOLUTION INTRAMUSCULAR; INTRAVENOUS at 11:01

## 2017-05-04 RX ADMIN — MIDAZOLAM HYDROCHLORIDE 1 MG: 1 INJECTION, SOLUTION INTRAMUSCULAR; INTRAVENOUS at 11:01

## 2017-05-04 RX ADMIN — OMEPRAZOLE 20 MG: 20 CAPSULE, DELAYED RELEASE ORAL at 08:42

## 2017-05-04 RX ADMIN — MIDAZOLAM HYDROCHLORIDE 1 MG: 1 INJECTION, SOLUTION INTRAMUSCULAR; INTRAVENOUS at 10:32

## 2017-05-04 RX ADMIN — MIDAZOLAM 1 MG: 1 INJECTION INTRAMUSCULAR; INTRAVENOUS at 10:32

## 2017-05-04 ASSESSMENT — ENCOUNTER SYMPTOMS
HEARTBURN: 0
HEADACHES: 0
NAUSEA: 0
ABDOMINAL PAIN: 1
PSYCHIATRIC NEGATIVE: 1
COUGH: 0
MUSCULOSKELETAL NEGATIVE: 1
CONSTITUTIONAL NEGATIVE: 1
DEPRESSION: 0
NEUROLOGICAL NEGATIVE: 1
CONSTIPATION: 0
MYALGIAS: 0
CARDIOVASCULAR NEGATIVE: 1
BLURRED VISION: 0
FEVER: 0
DIARRHEA: 0
RESPIRATORY NEGATIVE: 1
EYES NEGATIVE: 1

## 2017-05-04 ASSESSMENT — PAIN SCALES - GENERAL
PAINLEVEL_OUTOF10: 6
PAINLEVEL_OUTOF10: 5
PAINLEVEL_OUTOF10: 10
PAINLEVEL_OUTOF10: 10
PAINLEVEL_OUTOF10: 5
PAINLEVEL_OUTOF10: 0
PAINLEVEL_OUTOF10: 10
PAINLEVEL_OUTOF10: 3
PAINLEVEL_OUTOF10: 1

## 2017-05-04 NOTE — PROGRESS NOTES
Hospital Medicine Progress Note, Adult, Complex               Author: Robert Calvillo  Date & Time created: 5/4/2017  2:10 PM     Interval History:  CC: 3-4 months of unexplained abdominal pain  4/29 Patient troubled with 3-4 months of unexplained abdominal pain, last visit to [facility] showed concerns of new liver lesions and thickening of stomach wall.  She had presented back to Dr Santamaria's and coordination of biopsy was to be done but she had worsening pain again last night and came to ED again.  She is feeling better with the pain medications she has been given here.  She has a huge stool burden seen on CT and this could also be significantly contributing to her pain.  Lactulose scheduled started.  She will also need GI consult for UGI endoscopy for evaluation of her stomach given wall thickening - Dr Dias did a colonoscopy on her in 2/2017 - Will discuss further with GI about getting this done prior to dc once her stool burden has been alleviated.  4/30 Patient feeling better today, had large amount of bowel movements with lactulose overnight.  She states her abdominal pain is better and controlled with oral medications.  Will consult GI tomorrow as constipation resolved and pain still persists.  No acute complaints.  H/h dropped with hydration, no evidence of bleeding.  5/1 Patient up to chair when seen this am, pain about the same but controlled with current PO medications.  Dr Quintero consulted for GI and will take patient to endoscopy tomorrow for biopsies of the stomach lesion.  Dr Alvarez consulted for IP oncology recommendations.  D/w Dr Sue who ordered liver biopsy for further tissue - will hold off on this to see if this is needed after stomach biopsy.  5/2 EGD today, biopsy taken. CA 19-9 elev at 36.1. Pt still has some abd pain.   5/3 Fever overnight, resolved. Had BM in AM. Path still pending, but possibly will be negative. NPO midnight in case pt needs liver bx tomm.   5/4 EGD path shows only  intestinal metaplasia. Liver biopsy done today. Continues to have abd pain. Iron studies show mixed picture.     Review of Systems:  Review of Systems   Constitutional: Negative for fever.   HENT: Negative for congestion.    Eyes: Negative for blurred vision.   Respiratory: Negative for cough.    Cardiovascular: Negative for chest pain.   Gastrointestinal: Positive for abdominal pain. Negative for heartburn, nausea, diarrhea and constipation.   Genitourinary: Negative for dysuria.   Musculoskeletal: Negative for myalgias.   Neurological: Negative for headaches.   Psychiatric/Behavioral: Negative for depression.       Physical Exam:  Physical Exam   Constitutional: She is oriented to person, place, and time. She appears well-developed and well-nourished. No distress.   HENT:   Head: Normocephalic.   Eyes: Conjunctivae are normal.   Cardiovascular: Normal rate and regular rhythm.    Pulmonary/Chest: Effort normal and breath sounds normal. No respiratory distress.   Abdominal: Soft. Bowel sounds are normal. She exhibits no distension. There is tenderness. There is no rebound and no guarding.   Musculoskeletal: She exhibits no edema.   Neurological: She is alert and oriented to person, place, and time. No cranial nerve deficit.   Skin: Skin is dry. She is not diaphoretic. No erythema.   Psychiatric: She has a normal mood and affect. Her behavior is normal.   Nursing note and vitals reviewed.      Labs:        Invalid input(s): [id number]      Recent Labs      05/03/17   0350  05/04/17   0320   SODIUM  131*  132*   POTASSIUM  3.8  3.5*   CHLORIDE  99  99   CO2  26  26   BUN  7*  6*   CREATININE  0.71  0.67   CALCIUM  8.5  8.2*     Recent Labs      05/03/17   0350  05/04/17   0320   GLUCOSE  134*  126*     Recent Labs      05/03/17   0350  05/04/17   0320   RBC  2.89*  2.80*   HEMOGLOBIN  8.6*  8.2*   HEMATOCRIT  26.1*  25.2*   PLATELETCT  381  411   IRON   --   10*   FERRITIN   --   582.3*   TOTIRONBC   --   169*      Recent Labs      17   0350  17   0320   WBC  8.7  7.5           Hemodynamics:  Temp (24hrs), Av.9 °C (98.5 °F), Min:36.7 °C (98.1 °F), Max:37.2 °C (98.9 °F)  Temperature: 36.7 °C (98.1 °F)  Pulse  Av.8  Min: 73  Max: 118   Blood Pressure: 117/54 mmHg     Respiratory:    Respiration: 16, Pulse Oximetry: 93 %           Fluids:  No intake or output data in the 24 hours ending 17 1410     GI/Nutrition:  Orders Placed This Encounter   Procedures   • DIET ORDER     Standing Status: Standing      Number of Occurrences: 1      Standing Expiration Date:      Order Specific Question:  Diet:     Answer:  Diabetic [3]     Medical Decision Making, by Problem:  Active Hospital Problems    Diagnosis   • Anal carcinoma (CMS-HCC) [C21.0]  - concern for recurrence after treatments with stomach wall thickening and liver lesion,   - GI and oncology consulted   - s/p EGD, path intestinal metaplasia no h pylori   - if negative, will need liver biopsy    • Intractable abdominal pain [R10.9]  - better but persisting after resolution of consitpation  - no longer intractable but still requiring IV dialudid   - possibly 2/2 cancer   • Constipation [K59.00]  - resolved  - had BM 5/3/2017   • DMII (diabetes mellitus, type 2) (CMS-HCC) [E11.9]  - diabetic diet, ssi     • HTN (hypertension) [I10]coreg     • HLD (hyperlipidemia) [E78.5]statin   Anemia  - Iron studies show mixed picture     Labs reviewed and Medications reviewed  Ivan catheter: No Ivan        DVT prophylaxis - mechanical: SCDs

## 2017-05-04 NOTE — CARE PLAN
Problem: Venous Thromboembolism (VTW)/Deep Vein Thrombosis (DVT) Prevention:  Goal: Patient will participate in Venous Thrombosis (VTE)/Deep Vein Thrombosis (DVT)Prevention Measures  Outcome: PROGRESSING AS EXPECTED  Pt will be compliant with DVT prevention methods, including taking medications as directed.

## 2017-05-04 NOTE — PROGRESS NOTES
Gastroenterology Progress Note     Author: Arun Dias   Date & Time Created: 5/4/2017 2:06 PM    Interval History:  5/2/2017 - EGD revealed two benign-appearing antral erosions and benign appearing mild thickening of gastric folds.  Bx'd each.  5/3/2017 - No new symptoms.  Pathology pending.    Review of Systems:  Review of Systems   Constitutional: Negative.    HENT: Negative.    Eyes: Negative.    Respiratory: Negative.    Cardiovascular: Negative.    Gastrointestinal: Positive for abdominal pain.   Genitourinary: Negative.    Musculoskeletal: Negative.    Skin: Negative.    Neurological: Negative.    Endo/Heme/Allergies: Negative.    Psychiatric/Behavioral: Negative.        Physical Exam:  Physical Exam   Constitutional: She is oriented to person, place, and time. She appears well-developed and well-nourished.   HENT:   Head: Normocephalic.   Eyes: Conjunctivae are normal. Pupils are equal, round, and reactive to light.   Neck: Normal range of motion. Neck supple. No JVD present. No tracheal deviation present. No thyromegaly present.   Cardiovascular: Normal rate and regular rhythm.    Pulmonary/Chest: Effort normal and breath sounds normal. No respiratory distress. She has no wheezes. She has no rales. She exhibits no tenderness.   Abdominal: Soft. Bowel sounds are normal. She exhibits no distension and no mass. There is tenderness. There is no rebound and no guarding.   Musculoskeletal: She exhibits no edema or tenderness.   Lymphadenopathy:     She has no cervical adenopathy.   Neurological: She is alert and oriented to person, place, and time. No cranial nerve deficit. Coordination normal.   Skin: Skin is warm and dry. No rash noted. No erythema. No pallor.       Labs:        Invalid input(s): [id number]      Recent Labs      05/03/17   0350  05/04/17   0320   SODIUM  131*  132*   POTASSIUM  3.8  3.5*   CHLORIDE  99  99   CO2  26  26   BUN  7*  6*   CREATININE  0.71  0.67   CALCIUM  8.5  8.2*     Recent  Labs      17   0350  17   0320   GLUCOSE  134*  126*     Recent Labs      17   0350  17   0320   RBC  2.89*  2.80*   HEMOGLOBIN  8.6*  8.2*   HEMATOCRIT  26.1*  25.2*   PLATELETCT  381  411   IRON   --   10*   FERRITIN   --   582.3*   TOTIRONBC   --   169*     Recent Labs      17   0350  17   0320   WBC  8.7  7.5     Hemodynamics:  Temp (24hrs), Av.9 °C (98.5 °F), Min:36.7 °C (98.1 °F), Max:37.2 °C (98.9 °F)  Temperature: 36.7 °C (98.1 °F)  Pulse  Av.8  Min: 73  Max: 118   Blood Pressure: 117/54 mmHg     Respiratory:    Respiration: 16, Pulse Oximetry: 93 %           Fluids:  No intake or output data in the 24 hours ending 17 1406     GI/Nutrition:  Orders Placed This Encounter   Procedures   • DIET ORDER     Standing Status: Standing      Number of Occurrences: 1      Standing Expiration Date:      Order Specific Question:  Diet:     Answer:  Diabetic [3]     Medical Decision Making, by Problem:  Active Hospital Problems    Diagnosis   • Anal carcinoma (CMS-HCC) [C21.0]   • Intractable abdominal pain [R10.9]   • Constipation [K59.00]   • DMII (diabetes mellitus, type 2) (CMS-HCC) [E11.9]   • HTN (hypertension) [I10]   • HLD (hyperlipidemia) [E78.5]     2017  A. Gastric antrum:         Fragments of benign gastric mucosa one of which demonstrates          intestinal metaplasia with mild chronic inflammation.         Warthin-Starry stain, with appropriate controls, is negative for          H. pylori organisms.         No evidence of neoplasia identified.  B. Gastric body:         Fragments of benign ulcerated gastric mucosa         Warthin-Starry stain, with appropriate controls, is negative for          H. pylori organisms.         No evidence of neoplasia identified    IMPRESSION:  1.  Abnormal CT: Metastatic disease of the liver.  2.  Focal thickening of the greater curvature of the stomach on CT scan,    possible tumor.  EGD didn't suggest a malignant process.  Gastric internal metaplasia. (-)HP  3.  Generalized abdominal pain and tenderness.  4.  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  5.  History of remote breast and cervical cancer.  6.  History of adenomatous colon polyps.  7.  Other problems include hypertension, diabetes, hypercholesterolemia,    gastroesophageal reflux disease, asthma, and chronic pain.    Plan:  1. Await pathology  2. Continue supportive care    Core Measures

## 2017-05-04 NOTE — OR SURGEON
Immediate Post- Operative Note        PostOp Diagnosis: liver nodules      Procedure(s): segment 2 liver nodule biopsy      Estimated Blood Loss: Less than 5 ml        Complications: None            5/4/2017     11:07 AM     Jona Ross

## 2017-05-04 NOTE — PROGRESS NOTES
Care assumed at 0700. Patient resting in bed. Assessment as charted. C/O abdominal pain. NPO for liver biopsy today, aspirin held. Patient refusing bed alarm but gets up with no assist. Steady on feet. Will continue to monitor.

## 2017-05-04 NOTE — CARE PLAN
Problem: Pain Management  Goal: Pain level will decrease to patient’s comfort goal  Outcome: PROGRESSING SLOWER THAN EXPECTED  Patient reports lower abdominal pain stabbing in nature as a 10/10 frequently. IV pain medication given PRN per MAR.     Problem: Urinary Elimination:  Goal: Ability to reestablish a normal urinary elimination pattern will improve  Outcome: PROGRESSING AS EXPECTED  Patient voiding with bedside commode. Experiencing urgency

## 2017-05-04 NOTE — PROGRESS NOTES
CT Note:  Dr Ross completed liver biopsy.  Patient tolerated procedure well and remained hemodynamically stable.  Cores x 3 collected and taken to lab.  Report given to LOLA Prieto and patient taken to room via transport in stable condition.

## 2017-05-04 NOTE — DISCHARGE PLANNING
Met with patient to discuss Advance Directives - she would like to look at document prior to completing. Left packet and instructed patient to have nurse call  when she is ready.

## 2017-05-04 NOTE — CARE PLAN
Problem: Safety  Goal: Will remain free from injury  Outcome: PROGRESSING AS EXPECTED  Pt remains injury free this shift. Bed in lowest position. Call light within reach. Personal belongings within reach. Room free of clutter. Non-skid socks on feet.

## 2017-05-04 NOTE — PROGRESS NOTES
Pt resting with eyes closed. No s/s of pain or distress. Bed in lowest position. Call light and personal belongings within reach. Room free of clutter. Non-skid socks on feet. Will continue to monitor and provide care.

## 2017-05-05 ENCOUNTER — TELEPHONE (OUTPATIENT)
Dept: ONCOLOGY | Facility: MEDICAL CENTER | Age: 62
End: 2017-05-05

## 2017-05-05 ENCOUNTER — OUTPATIENT INFUSION SERVICES (OUTPATIENT)
Dept: ONCOLOGY | Facility: MEDICAL CENTER | Age: 62
End: 2017-05-05
Attending: INTERNAL MEDICINE
Payer: MEDICAID

## 2017-05-05 LAB
ANION GAP SERPL CALC-SCNC: 9 MMOL/L (ref 0–11.9)
BUN SERPL-MCNC: 7 MG/DL (ref 8–22)
CALCIUM SERPL-MCNC: 8.1 MG/DL (ref 8.5–10.5)
CHLORIDE SERPL-SCNC: 99 MMOL/L (ref 96–112)
CO2 SERPL-SCNC: 25 MMOL/L (ref 20–33)
CREAT SERPL-MCNC: 0.69 MG/DL (ref 0.5–1.4)
ERYTHROCYTE [DISTWIDTH] IN BLOOD BY AUTOMATED COUNT: 47.8 FL (ref 35.9–50)
GFR SERPL CREATININE-BSD FRML MDRD: >60 ML/MIN/1.73 M 2
GLUCOSE BLD-MCNC: 111 MG/DL (ref 65–99)
GLUCOSE BLD-MCNC: 126 MG/DL (ref 65–99)
GLUCOSE BLD-MCNC: 131 MG/DL (ref 65–99)
GLUCOSE SERPL-MCNC: 129 MG/DL (ref 65–99)
HCT VFR BLD AUTO: 25.4 % (ref 37–47)
HGB BLD-MCNC: 8.1 G/DL (ref 12–16)
MCH RBC QN AUTO: 28.7 PG (ref 27–33)
MCHC RBC AUTO-ENTMCNC: 31.9 G/DL (ref 33.6–35)
MCV RBC AUTO: 90.1 FL (ref 81.4–97.8)
PLATELET # BLD AUTO: 455 K/UL (ref 164–446)
PMV BLD AUTO: 9.5 FL (ref 9–12.9)
POTASSIUM SERPL-SCNC: 3.6 MMOL/L (ref 3.6–5.5)
RBC # BLD AUTO: 2.82 M/UL (ref 4.2–5.4)
SODIUM SERPL-SCNC: 133 MMOL/L (ref 135–145)
WBC # BLD AUTO: 7.9 K/UL (ref 4.8–10.8)

## 2017-05-05 PROCEDURE — 85027 COMPLETE CBC AUTOMATED: CPT

## 2017-05-05 PROCEDURE — A9270 NON-COVERED ITEM OR SERVICE: HCPCS | Performed by: HOSPITALIST

## 2017-05-05 PROCEDURE — 700102 HCHG RX REV CODE 250 W/ 637 OVERRIDE(OP)

## 2017-05-05 PROCEDURE — 770009 HCHG ROOM/CARE - ONCOLOGY SEMI PRI*

## 2017-05-05 PROCEDURE — A9270 NON-COVERED ITEM OR SERVICE: HCPCS

## 2017-05-05 PROCEDURE — 99233 SBSQ HOSP IP/OBS HIGH 50: CPT | Performed by: HOSPITALIST

## 2017-05-05 PROCEDURE — 700102 HCHG RX REV CODE 250 W/ 637 OVERRIDE(OP): Performed by: HOSPITALIST

## 2017-05-05 PROCEDURE — 700111 HCHG RX REV CODE 636 W/ 250 OVERRIDE (IP): Performed by: HOSPITALIST

## 2017-05-05 PROCEDURE — 80048 BASIC METABOLIC PNL TOTAL CA: CPT

## 2017-05-05 PROCEDURE — 82962 GLUCOSE BLOOD TEST: CPT

## 2017-05-05 RX ORDER — MORPHINE SULFATE 15 MG/1
15 TABLET, FILM COATED, EXTENDED RELEASE ORAL EVERY 12 HOURS
Status: DISCONTINUED | OUTPATIENT
Start: 2017-05-05 | End: 2017-05-06 | Stop reason: HOSPADM

## 2017-05-05 RX ORDER — OXYCODONE HYDROCHLORIDE 5 MG/1
15 TABLET ORAL
Status: DISCONTINUED | OUTPATIENT
Start: 2017-05-05 | End: 2017-05-06 | Stop reason: HOSPADM

## 2017-05-05 RX ADMIN — ASPIRIN 81 MG: 81 TABLET, CHEWABLE ORAL at 09:12

## 2017-05-05 RX ADMIN — OXYCODONE HYDROCHLORIDE 15 MG: 5 TABLET ORAL at 12:47

## 2017-05-05 RX ADMIN — ONDANSETRON 4 MG: 2 INJECTION, SOLUTION INTRAMUSCULAR; INTRAVENOUS at 20:46

## 2017-05-05 RX ADMIN — STANDARDIZED SENNA CONCENTRATE AND DOCUSATE SODIUM 2 TABLET: 8.6; 5 TABLET, FILM COATED ORAL at 09:13

## 2017-05-05 RX ADMIN — OXYCODONE HYDROCHLORIDE 5 MG: 5 TABLET ORAL at 01:53

## 2017-05-05 RX ADMIN — OXYCODONE HYDROCHLORIDE 10 MG: 10 TABLET ORAL at 05:58

## 2017-05-05 RX ADMIN — MORPHINE SULFATE 15 MG: 15 TABLET, EXTENDED RELEASE ORAL at 20:40

## 2017-05-05 RX ADMIN — CARVEDILOL 25 MG: 25 TABLET, FILM COATED ORAL at 20:41

## 2017-05-05 RX ADMIN — OXYCODONE HYDROCHLORIDE 10 MG: 10 TABLET ORAL at 09:12

## 2017-05-05 RX ADMIN — ENALAPRIL MALEATE 20 MG: 10 TABLET ORAL at 20:46

## 2017-05-05 RX ADMIN — ATORVASTATIN CALCIUM 20 MG: 20 TABLET, FILM COATED ORAL at 20:40

## 2017-05-05 RX ADMIN — STANDARDIZED SENNA CONCENTRATE AND DOCUSATE SODIUM 2 TABLET: 8.6; 5 TABLET, FILM COATED ORAL at 20:40

## 2017-05-05 RX ADMIN — OMEPRAZOLE 20 MG: 20 CAPSULE, DELAYED RELEASE ORAL at 09:13

## 2017-05-05 RX ADMIN — MORPHINE SULFATE 15 MG: 15 TABLET, EXTENDED RELEASE ORAL at 16:38

## 2017-05-05 ASSESSMENT — PAIN SCALES - GENERAL
PAINLEVEL_OUTOF10: 3
PAINLEVEL_OUTOF10: 2
PAINLEVEL_OUTOF10: 3
PAINLEVEL_OUTOF10: 0
PAINLEVEL_OUTOF10: 3
PAINLEVEL_OUTOF10: 2
PAINLEVEL_OUTOF10: 5
PAINLEVEL_OUTOF10: 0
PAINLEVEL_OUTOF10: 6

## 2017-05-05 ASSESSMENT — ENCOUNTER SYMPTOMS
DEPRESSION: 0
ABDOMINAL PAIN: 1
FEVER: 0
NAUSEA: 0
MYALGIAS: 0
COUGH: 0
DIARRHEA: 0
NERVOUS/ANXIOUS: 0
HEARTBURN: 0
HEADACHES: 0
BLURRED VISION: 0
CONSTIPATION: 0

## 2017-05-05 NOTE — PROGRESS NOTES
Received report from night shift RN and assumed care of this patient. Patient is awake and oriented. Reviewed POC with patient to work on pain control while waiting for liver biopsy results. Patient understood. Patient has a port to her upper left chest with positive blood return. 20 ml/hr NS infusing. BP was low at 101/46 so the blood pressure medications were held. See MAR.  notified. Patient calls for help when needed. Bed is in low, locked position. Treaded slipper socks on, call light and belongings are within reach.

## 2017-05-05 NOTE — TELEPHONE ENCOUNTER
Pt scheduled for Port flush in [facility]. Currently hospitalized. EOD as cancelled.

## 2017-05-05 NOTE — PROGRESS NOTES
Received bedside report from day RN and assumed care of pt at change of shift.  Pt A&0x4, medicated per MAR.  Port patent and + blood return noted.  Treaded socks on. Pt refuses bed alarm, stated she would call if needs to get out of bed.   See Epic for assessment.  Bed in lowest and locked position, call light within reach, all needs met at this time.

## 2017-05-05 NOTE — PROGRESS NOTES
Gastroenterology Progress Note     Author: Arun Dias   Date & Time Created: 5/5/2017 11:02 AM    Interval History:  5/2/2017 - EGD revealed two benign-appearing antral erosions and benign appearing mild thickening of gastric folds.  Bx'd each.  5/3/2017 - No new symptoms.  Pathology pending.  5/4/2017- Pathology pending  5/5/2017: Still have abdominal pain. Passing some stool. No fever, no chills no blood.    Review of Systems:  ROS  Constitutional: Negative.    HENT: Negative.    Eyes: Negative.    Respiratory: Negative.    Cardiovascular: Negative.    Gastrointestinal: Positive for abdominal pain.   Genitourinary: Negative.    Musculoskeletal: Negative.    Skin: Negative.    Neurological: Negative.    Endo/Heme/Allergies: Negative.    Psychiatric/Behavioral: Negative.    Physical Exam:  Physical Exam  Constitutional: She is oriented to person, place, and time. She appears well-developed and well-nourished.   HENT:    Head: Normocephalic.   Eyes: Conjunctivae are normal. Pupils are equal, round, and reactive to light.   Neck: Normal range of motion. Neck supple. No JVD present. No tracheal deviation present. No thyromegaly present.   Cardiovascular: Normal rate and regular rhythm.    Pulmonary/Chest: Effort normal and breath sounds normal. No respiratory distress. She has no wheezes. She has no rales. She exhibits no tenderness.   Abdominal: Soft. Bowel sounds are normal. She exhibits no distension and no mass. There is tenderness. There is no rebound and no guarding.   Musculoskeletal: She exhibits no edema or tenderness.   Lymphadenopathy:     She has no cervical adenopathy.   Neurological: She is alert and oriented to person, place, and time. No cranial nerve deficit. Coordination normal.   Skin: Skin is warm and dry. No rash noted. No erythema. No pallor    Labs:        Invalid input(s): [id number]      Recent Labs      05/03/17   0350  05/04/17   0320  05/05/17   0145   SODIUM  131*  132*  133*    POTASSIUM  3.8  3.5*  3.6   CHLORIDE  99  99  99   CO2  26  26  25   BUN  7*  6*  7*   CREATININE  0.71  0.67  0.69   CALCIUM  8.5  8.2*  8.1*     Recent Labs      17   0350  17   03217   0145   GLUCOSE  134*  126*  129*     Recent Labs      17   0350  17   03217   0145   RBC  2.89*  2.80*  2.82*   HEMOGLOBIN  8.6*  8.2*  8.1*   HEMATOCRIT  26.1*  25.2*  25.4*   PLATELETCT  381  411  455*   IRON   --   10*   --    FERRITIN   --   582.3*   --    TOTIRONBC   --   169*   --      Recent Labs      17   03517   0145   WBC  8.7  7.5  7.9     Hemodynamics:  Temp (24hrs), Av.6 °C (97.8 °F), Min:36.2 °C (97.2 °F), Max:37.1 °C (98.7 °F)  Temperature: 37.1 °C (98.7 °F)  Pulse  Av.9  Min: 73  Max: 118   Blood Pressure: 101/46 mmHg     Respiratory:    Respiration: 16, Pulse Oximetry: 91 %           Fluids:    Intake/Output Summary (Last 24 hours) at 17 1102  Last data filed at 17 2200   Gross per 24 hour   Intake    120 ml   Output      0 ml   Net    120 ml        GI/Nutrition:  Orders Placed This Encounter   Procedures   • DIET ORDER     Standing Status: Standing      Number of Occurrences: 1      Standing Expiration Date:      Order Specific Question:  Diet:     Answer:  Diabetic [3]     Medical Decision Making, by Problem:  Active Hospital Problems    Diagnosis   • Anal carcinoma (CMS-HCC) [C21.0]   • Intractable abdominal pain [R10.9]   • Constipation [K59.00]   • DMII (diabetes mellitus, type 2) (CMS-HCC) [E11.9]   • HTN (hypertension) [I10]   • HLD (hyperlipidemia) [E78.5]     2017  A. Gastric antrum:         Fragments of benign gastric mucosa one of which demonstrates          intestinal metaplasia with mild chronic inflammation.         Warthin-Starry stain, with appropriate controls, is negative for          H. pylori organisms.         No evidence of neoplasia identified.  B. Gastric body:         Fragments of benign  ulcerated gastric mucosa         Warthin-Starry stain, with appropriate controls, is negative for          H. pylori organisms.         No evidence of neoplasia identified    IMPRESSION:  1.  Abnormal CT: Metastatic disease of the liver.  2.  Focal thickening of the greater curvature of the stomach on CT scan,    possible tumor.  EGD didn't suggest a malignant process. Gastric internal metaplasia. (-)HP  3.  Generalized abdominal pain and tenderness.  4.  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  5.  History of remote breast and cervical cancer.  6.  History of adenomatous colon polyps.  7.  Other problems include hypertension, diabetes, hypercholesterolemia,    gastroesophageal reflux disease, asthma, and chronic pain.    Plan:  1. Await pathology  2. Continue supportive care  3. Consider increase stool softener if patient continues to take pain medications.    Core Measures

## 2017-05-05 NOTE — CARE PLAN
Problem: Infection  Goal: Will remain free from infection  Intervention: Assess signs and symptoms of infection  Pt shows no s/s of infection. Labs monitored, port site assessed. Dressing to port changed with sterile technique as was lifting up on bottom edge.       Problem: Knowledge Deficit  Goal: Knowledge of disease process/condition, treatment plan, diagnostic tests, and medications will improve  Intervention: Assess knowledge level of disease process/condition, treatment plan, diagnostic tests, and medications  Pt compliant with medications.  Pt understands awaiting liver biopsy results.

## 2017-05-05 NOTE — DISCHARGE PLANNING
Medical Social Work    Pt had liver biopsy yesterday.  Awaiting pathology results.  Pt would like to d/c home once medically cleared.    Plan:  SS will remain available to provide support and assist with d/c planning as needed.

## 2017-05-05 NOTE — PROGRESS NOTES
Hospital Medicine Progress Note, Adult, Complex               Author: Robert Calvillo  Date & Time created: 5/5/2017  1:51 PM     Interval History:  CC: 3-4 months of unexplained abdominal pain  4/29 Patient troubled with 3-4 months of unexplained abdominal pain, last visit to [facility] showed concerns of new liver lesions and thickening of stomach wall.  She had presented back to Dr Santamaria's and coordination of biopsy was to be done but she had worsening pain again last night and came to ED again.  She is feeling better with the pain medications she has been given here.  She has a huge stool burden seen on CT and this could also be significantly contributing to her pain.  Lactulose scheduled started.  She will also need GI consult for UGI endoscopy for evaluation of her stomach given wall thickening - Dr Dias did a colonoscopy on her in 2/2017 - Will discuss further with GI about getting this done prior to dc once her stool burden has been alleviated.  4/30 Patient feeling better today, had large amount of bowel movements with lactulose overnight.  She states her abdominal pain is better and controlled with oral medications.  Will consult GI tomorrow as constipation resolved and pain still persists.  No acute complaints.  H/h dropped with hydration, no evidence of bleeding.  5/1 Patient up to chair when seen this am, pain about the same but controlled with current PO medications.  Dr Quintero consulted for GI and will take patient to endoscopy tomorrow for biopsies of the stomach lesion.  Dr Alvarez consulted for IP oncology recommendations.  D/w Dr Sue who ordered liver biopsy for further tissue - will hold off on this to see if this is needed after stomach biopsy.  5/2 EGD today, biopsy taken. CA 19-9 elev at 36.1. Pt still has some abd pain.   5/3 Fever overnight, resolved. Had BM in AM. Path still pending, but possibly will be negative. NPO midnight in case pt needs liver bx tomm.   5/4 EGD path shows only  intestinal metaplasia. Liver biopsy done today. Continues to have abd pain. Iron studies show mixed picture.   5/5 Added holding parameters to BP meds. Held for hypotension. Added MS contin. Incr oxy to 15 PRN.     Review of Systems:  Review of Systems   Constitutional: Negative for fever.   HENT: Negative for congestion.    Eyes: Negative for blurred vision.   Respiratory: Negative for cough.    Cardiovascular: Negative for chest pain.   Gastrointestinal: Positive for abdominal pain. Negative for heartburn, nausea, diarrhea and constipation.   Musculoskeletal: Negative for myalgias.   Neurological: Negative for headaches.   Psychiatric/Behavioral: Negative for depression. The patient is not nervous/anxious.        Physical Exam:  Physical Exam   Constitutional: She is oriented to person, place, and time. She appears well-developed and well-nourished. No distress.   HENT:   Head: Normocephalic.   Eyes: Conjunctivae are normal.   Cardiovascular: Normal rate and regular rhythm.    Pulmonary/Chest: Effort normal and breath sounds normal. No respiratory distress. She has no wheezes.   Abdominal: Soft. Bowel sounds are normal. She exhibits no distension. There is tenderness. There is no rebound and no guarding.   Musculoskeletal: She exhibits no edema.   Neurological: She is alert and oriented to person, place, and time. No cranial nerve deficit.   Skin: Skin is dry. She is not diaphoretic. No erythema.   Psychiatric: She has a normal mood and affect. Her behavior is normal.   Nursing note and vitals reviewed.      Labs:        Invalid input(s): [id number]      Recent Labs      05/03/17   0350  05/04/17   0320  05/05/17   0145   SODIUM  131*  132*  133*   POTASSIUM  3.8  3.5*  3.6   CHLORIDE  99  99  99   CO2  26  26  25   BUN  7*  6*  7*   CREATININE  0.71  0.67  0.69   CALCIUM  8.5  8.2*  8.1*     Recent Labs      05/03/17   0350  05/04/17   0320  05/05/17   0145   GLUCOSE  134*  126*  129*     Recent Labs       17   0350  17   0320  17   0145   RBC  2.89*  2.80*  2.82*   HEMOGLOBIN  8.6*  8.2*  8.1*   HEMATOCRIT  26.1*  25.2*  25.4*   PLATELETCT  381  411  455*   IRON   --   10*   --    FERRITIN   --   582.3*   --    TOTIRONBC   --   169*   --      Recent Labs      17   0350  17   0320  17   0145   WBC  8.7  7.5  7.9           Hemodynamics:  Temp (24hrs), Av.6 °C (97.8 °F), Min:36.2 °C (97.2 °F), Max:37.1 °C (98.7 °F)  Temperature: 37.1 °C (98.7 °F)  Pulse  Av.9  Min: 73  Max: 118   Blood Pressure: 101/46 mmHg     Respiratory:    Respiration: 16, Pulse Oximetry: 91 %           Fluids:    Intake/Output Summary (Last 24 hours) at 17 1351  Last data filed at 17 2200   Gross per 24 hour   Intake    120 ml   Output      0 ml   Net    120 ml        GI/Nutrition:  Orders Placed This Encounter   Procedures   • DIET ORDER     Standing Status: Standing      Number of Occurrences: 1      Standing Expiration Date:      Order Specific Question:  Diet:     Answer:  Diabetic [3]     Medical Decision Making, by Problem:  Active Hospital Problems    Diagnosis   • Anal carcinoma (CMS-HCC) [C21.0]  - concern for recurrence after treatments with stomach wall thickening and liver lesion,   - GI and oncology consulted   - s/p EGD, path intestinal metaplasia no h pylori   - liver biopsy path pending   • Intractable abdominal pain [R10.9]  - better but persisting after resolution of consitpation  - no longer intractable but still requiring IV dialudid   - possibly 2/2 cancer   • Constipation [K59.00]  - resolved  - had BM 5/3/2017   • DMII (diabetes mellitus, type 2) (CMS-HCC) [E11.9]  - diabetic diet, ssi     • HTN (hypertension) [I10]  -coreg and enalapril w/ holding params      • HLD (hyperlipidemia) [E78.5]statin   Anemia  - Iron studies show mixed picture     Labs reviewed and Medications reviewed  Ivan catheter: No Ivan        DVT prophylaxis - mechanical: SCDs

## 2017-05-06 VITALS
RESPIRATION RATE: 18 BRPM | SYSTOLIC BLOOD PRESSURE: 102 MMHG | OXYGEN SATURATION: 90 % | DIASTOLIC BLOOD PRESSURE: 51 MMHG | TEMPERATURE: 97.8 F | HEART RATE: 86 BPM | BODY MASS INDEX: 30.61 KG/M2 | WEIGHT: 195 LBS | HEIGHT: 67 IN

## 2017-05-06 LAB
ANION GAP SERPL CALC-SCNC: 9 MMOL/L (ref 0–11.9)
BUN SERPL-MCNC: 6 MG/DL (ref 8–22)
CALCIUM SERPL-MCNC: 8.4 MG/DL (ref 8.5–10.5)
CHLORIDE SERPL-SCNC: 101 MMOL/L (ref 96–112)
CO2 SERPL-SCNC: 26 MMOL/L (ref 20–33)
CREAT SERPL-MCNC: 0.76 MG/DL (ref 0.5–1.4)
ERYTHROCYTE [DISTWIDTH] IN BLOOD BY AUTOMATED COUNT: 47.7 FL (ref 35.9–50)
GFR SERPL CREATININE-BSD FRML MDRD: >60 ML/MIN/1.73 M 2
GLUCOSE BLD-MCNC: 117 MG/DL (ref 65–99)
GLUCOSE BLD-MCNC: 125 MG/DL (ref 65–99)
GLUCOSE BLD-MCNC: 132 MG/DL (ref 65–99)
GLUCOSE SERPL-MCNC: 113 MG/DL (ref 65–99)
HCT VFR BLD AUTO: 25.3 % (ref 37–47)
HGB BLD-MCNC: 8.2 G/DL (ref 12–16)
MCH RBC QN AUTO: 29.3 PG (ref 27–33)
MCHC RBC AUTO-ENTMCNC: 32.4 G/DL (ref 33.6–35)
MCV RBC AUTO: 90.4 FL (ref 81.4–97.8)
PLATELET # BLD AUTO: 491 K/UL (ref 164–446)
PMV BLD AUTO: 9.3 FL (ref 9–12.9)
POTASSIUM SERPL-SCNC: 3.8 MMOL/L (ref 3.6–5.5)
RBC # BLD AUTO: 2.8 M/UL (ref 4.2–5.4)
SODIUM SERPL-SCNC: 136 MMOL/L (ref 135–145)
WBC # BLD AUTO: 8.9 K/UL (ref 4.8–10.8)

## 2017-05-06 PROCEDURE — A9270 NON-COVERED ITEM OR SERVICE: HCPCS | Performed by: HOSPITALIST

## 2017-05-06 PROCEDURE — 700102 HCHG RX REV CODE 250 W/ 637 OVERRIDE(OP): Performed by: HOSPITALIST

## 2017-05-06 PROCEDURE — 80048 BASIC METABOLIC PNL TOTAL CA: CPT

## 2017-05-06 PROCEDURE — 3E0234Z INTRODUCTION OF SERUM, TOXOID AND VACCINE INTO MUSCLE, PERCUTANEOUS APPROACH: ICD-10-PCS | Performed by: HOSPITALIST

## 2017-05-06 PROCEDURE — 99239 HOSP IP/OBS DSCHRG MGMT >30: CPT | Performed by: HOSPITALIST

## 2017-05-06 PROCEDURE — 82962 GLUCOSE BLOOD TEST: CPT

## 2017-05-06 PROCEDURE — 700102 HCHG RX REV CODE 250 W/ 637 OVERRIDE(OP)

## 2017-05-06 PROCEDURE — 90471 IMMUNIZATION ADMIN: CPT

## 2017-05-06 PROCEDURE — 700111 HCHG RX REV CODE 636 W/ 250 OVERRIDE (IP): Performed by: HOSPITALIST

## 2017-05-06 PROCEDURE — 85027 COMPLETE CBC AUTOMATED: CPT

## 2017-05-06 PROCEDURE — 90732 PPSV23 VACC 2 YRS+ SUBQ/IM: CPT | Performed by: HOSPITALIST

## 2017-05-06 PROCEDURE — A9270 NON-COVERED ITEM OR SERVICE: HCPCS

## 2017-05-06 RX ORDER — MORPHINE SULFATE 15 MG/1
15 TABLET, FILM COATED, EXTENDED RELEASE ORAL 3 TIMES DAILY
Qty: 90 TAB | Refills: 0 | Status: ON HOLD | OUTPATIENT
Start: 2017-05-06 | End: 2017-10-23

## 2017-05-06 RX ORDER — POLYETHYLENE GLYCOL 3350 17 G/17G
17 POWDER, FOR SOLUTION ORAL DAILY
Qty: 1 BOTTLE | Refills: 3 | Status: ON HOLD | COMMUNITY
End: 2017-06-19

## 2017-05-06 RX ORDER — POLYETHYLENE GLYCOL 3350 17 G/17G
1 POWDER, FOR SOLUTION ORAL DAILY
Status: DISCONTINUED | OUTPATIENT
Start: 2017-05-06 | End: 2017-05-06 | Stop reason: HOSPADM

## 2017-05-06 RX ADMIN — OXYCODONE HYDROCHLORIDE 5 MG: 5 TABLET ORAL at 00:07

## 2017-05-06 RX ADMIN — STANDARDIZED SENNA CONCENTRATE AND DOCUSATE SODIUM 2 TABLET: 8.6; 5 TABLET, FILM COATED ORAL at 08:25

## 2017-05-06 RX ADMIN — MORPHINE SULFATE 15 MG: 15 TABLET, EXTENDED RELEASE ORAL at 08:25

## 2017-05-06 RX ADMIN — HEPARIN 500 UNITS: 100 SYRINGE at 15:24

## 2017-05-06 RX ADMIN — CARVEDILOL 25 MG: 25 TABLET, FILM COATED ORAL at 08:25

## 2017-05-06 RX ADMIN — ASPIRIN 81 MG: 81 TABLET, CHEWABLE ORAL at 08:25

## 2017-05-06 RX ADMIN — OXYCODONE HYDROCHLORIDE 5 MG: 5 TABLET ORAL at 11:57

## 2017-05-06 RX ADMIN — OMEPRAZOLE 20 MG: 20 CAPSULE, DELAYED RELEASE ORAL at 08:25

## 2017-05-06 RX ADMIN — PNEUMOCOCCAL VACCINE POLYVALENT 25 MCG
25; 25; 25; 25; 25; 25; 25; 25; 25; 25; 25; 25; 25; 25; 25; 25; 25; 25; 25; 25; 25; 25; 25 INJECTION, SOLUTION INTRAMUSCULAR; SUBCUTANEOUS at 14:55

## 2017-05-06 RX ADMIN — POLYETHYLENE GLYCOL 3350 1 PACKET: 17 POWDER, FOR SOLUTION ORAL at 11:57

## 2017-05-06 ASSESSMENT — PAIN SCALES - GENERAL
PAINLEVEL_OUTOF10: 2
PAINLEVEL_OUTOF10: 0
PAINLEVEL_OUTOF10: 2
PAINLEVEL_OUTOF10: 6
PAINLEVEL_OUTOF10: 4
PAINLEVEL_OUTOF10: 6
PAINLEVEL_OUTOF10: 5

## 2017-05-06 NOTE — PROGRESS NOTES
Received report from day RN and assumed care of pt at change of shift.  Pt had visitors, in good spirits.    Medicated per MAR. Pt stated will call when needs to ambulate. Port patent, + blood return.   Bed in lowest position, call light within reach, all needs met. VSS.

## 2017-05-06 NOTE — CARE PLAN
Problem: Safety  Goal: Will remain free from falls  Outcome: PROGRESSING AS EXPECTED  Bed in low position. Mobility assessed. Bed locked. Non slip socks.     Problem: Pain Management  Goal: Pain level will decrease to patient’s comfort goal  Outcome: PROGRESSING AS EXPECTED  Pain well managed with ER morphine. Comfort at rest.

## 2017-05-06 NOTE — PROGRESS NOTES
Gastroenterology Progress Note     Author: Arun Dias   Date & Time Created: 5/6/2017 10:10 AM    Interval History:  5/2/2017 - EGD revealed two benign-appearing antral erosions and benign appearing mild thickening of gastric folds.  Bx'd each.  5/3/2017 - No new symptoms.  Pathology pending.  5/4/2017- Pathology pending  5/5/2017- Still have abdominal pain. Passing some stool. No fever, no chills no blood  5/6/2017- Abdominal pain still, slightly improved.    Review of Systems:  ROS  Constitutional: Negative.     HENT: Negative.     Eyes: Negative.     Respiratory: Negative.     Cardiovascular: Negative.     Gastrointestinal: Positive for abdominal pain.    Genitourinary: Negative.     Musculoskeletal: Negative.     Skin: Negative.     Neurological: Negative.     Endo/Heme/Allergies: Negative.     Psychiatric/Behavioral: Negative.    Physical Exam:  Physical Exam  Constitutional: She is oriented to person, place, and time. She appears well-developed and well-nourished.    HENT:    Head: Normocephalic.    Eyes: Conjunctivae are normal. Pupils are equal, round, and reactive to light.    Neck: Normal range of motion. Neck supple. No JVD present. No tracheal deviation present. No thyromegaly present.    Cardiovascular: Normal rate and regular rhythm.     Pulmonary/Chest: Effort normal and breath sounds normal. No respiratory distress. She has no wheezes. She has no rales. She exhibits no tenderness.    Abdominal: Soft. Bowel sounds are normal. She exhibits no distension and no mass. There is tenderness. There is no rebound and no guarding.   Musculoskeletal: She exhibits no edema or tenderness.   Lymphadenopathy:     She has no cervical adenopathy.   Neurological: She is alert and oriented to person, place, and time. No cranial nerve deficit. Coordination normal.    Skin: Skin is warm and dry. No rash noted. No erythema. No pallor    Labs:        Invalid input(s): [id number]      Recent Labs      05/04/17   0320   17   0425   SODIUM  132*  133*  136   POTASSIUM  3.5*  3.6  3.8   CHLORIDE  99  99  101   CO2  26  25  26   BUN  6*  7*  6*   CREATININE  0.67  0.69  0.76   CALCIUM  8.2*  8.1*  8.4*     Recent Labs      17   0425   GLUCOSE  126*  129*  113*     Recent Labs      17   0425   RBC  2.80*  2.82*  2.80*   HEMOGLOBIN  8.2*  8.1*  8.2*   HEMATOCRIT  25.2*  25.4*  25.3*   PLATELETCT  411  455*  491*   IRON  10*   --    --    FERRITIN  582.3*   --    --    TOTIRONBC  169*   --    --      Recent Labs      17   0425   WBC  7.5  7.9  8.9     Hemodynamics:  Temp (24hrs), Av.2 °C (98.9 °F), Min:36.6 °C (97.8 °F), Max:37.9 °C (100.3 °F)  Temperature: 36.6 °C (97.8 °F)  Pulse  Av  Min: 73  Max: 118   Blood Pressure: 102/51 mmHg     Respiratory:    Respiration: 18, Pulse Oximetry: 90 %           Fluids:    Intake/Output Summary (Last 24 hours) at 17 1010  Last data filed at 17   Gross per 24 hour   Intake    240 ml   Output      0 ml   Net    240 ml        GI/Nutrition:  Orders Placed This Encounter   Procedures   • DIET ORDER     Standing Status: Standing      Number of Occurrences: 1      Standing Expiration Date:      Order Specific Question:  Diet:     Answer:  Diabetic [3]     Medical Decision Making, by Problem:  Active Hospital Problems    Diagnosis   • Anal carcinoma (CMS-HCC) [C21.0]   • Intractable abdominal pain [R10.9]   • Constipation [K59.00]   • DMII (diabetes mellitus, type 2) (CMS-HCC) [E11.9]   • HTN (hypertension) [I10]   • HLD (hyperlipidemia) [E78.5]     2017  A. Gastric antrum:         Fragments of benign gastric mucosa one of which demonstrates          intestinal metaplasia with mild chronic inflammation.         Warthin-Starry stain, with appropriate controls, is negative for          H. pylori organisms.         No evidence of neoplasia  identified.  B. Gastric body:         Fragments of benign ulcerated gastric mucosa         Warthin-Starry stain, with appropriate controls, is negative for          H. pylori organisms.         No evidence of neoplasia identified    Liver Biopsy 5/4/2017  FINAL DIAGNOSIS:  A. 3 cores, liver biopsy segment 2:         Histologically and immunohistochemically consistent with          metastatic squamous cell carcinoma in liver.    IMPRESSION:  1.  Abnormal CT: Metastatic disease of the liver. Confirmed SCC on biopsy  2.  Focal thickening of the greater curvature of the stomach on CT scan,    possible tumor.  EGD didn't suggest a malignant process. Gastric internal metaplasia. (-)HP  3.  Generalized abdominal pain and tenderness.  4.  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  5.  History of remote breast and cervical cancer.  6.  History of adenomatous colon polyps.  7.  Other problems include hypertension, diabetes, hypercholesterolemia,    gastroesophageal reflux disease, asthma, and chronic pain.    Plan:  1. Defer metastatic disease to Oncology    GI TO SIGN OFF / STAND BY - Thank you very much for allowing me to participate in the care of your patient.  Please feel free to contact me anytime at [phone]    [employer] Measures

## 2017-05-06 NOTE — DISCHARGE INSTRUCTIONS
Discharge Instructions    Discharged to home by car with relative. Discharged via wheelchair, hospital escort: Yes.  Special equipment needed: Not Applicable    Be sure to schedule a follow-up appointment with your primary care doctor or any specialists as instructed.     Discharge Plan:   Influenza Vaccine Indication: Patient Refuses    I understand that a diet low in cholesterol, fat, and sodium is recommended for good health. Unless I have been given specific instructions below for another diet, I accept this instruction as my diet prescription.   Other diet: diabetic    Special Instructions: None    · Is patient discharged on Warfarin / Coumadin?   No     · Is patient Post Blood Transfusion?  No    Depression / Suicide Risk    As you are discharged from this [facility] facility, it is important to learn how to keep safe from harming yourself.    Recognize the warning signs:  · Abrupt changes in personality, positive or negative- including increase in energy   · Giving away possessions  · Change in eating patterns- significant weight changes-  positive or negative  · Change in sleeping patterns- unable to sleep or sleeping all the time   · Unwillingness or inability to communicate  · Depression  · Unusual sadness, discouragement and loneliness  · Talk of wanting to die  · Neglect of personal appearance   · Rebelliousness- reckless behavior  · Withdrawal from people/activities they love  · Confusion- inability to concentrate     If you or a loved one observes any of these behaviors or has concerns about self-harm, here's what you can do:  · Talk about it- your feelings and reasons for harming yourself  · Remove any means that you might use to hurt yourself (examples: pills, rope, extension cords, firearm)  · Get professional help from the community (Mental Health, Substance Abuse, psychological counseling)  · Do not be alone:Call your Safe Contact- someone whom you trust who will be there for you.  · Call your  local CRISIS HOTLINE 732-9109 or 345-491-7094  · Call your local Children's Mobile Crisis Response Team [place] (324) 851-2582 or www.IActive  · Call the toll free National Suicide Prevention Hotlines   · National Suicide Prevention Lifeline 964-471-CXSS (7693)  · National iDoc24 Line Network 800-SUICIDE (256-1520)

## 2017-05-06 NOTE — PROGRESS NOTES
Bedside report received from night shift, assumed care of pt at 0715. Pt in bed resting comfortably with no s/sx of pain or discomfort.  Pt A&O X 4. Denying pain at this time.  Standby assist. Pt calls appropriately for medication or assistance as needed. Call light within reach, all appropriate fall precautions in place and personal belongings within reach; hourly rounding in place. No needs at this time. See flowsheets for further assessment details.

## 2017-05-06 NOTE — DISCHARGE SUMMARY
Hospital Medicine Discharge Note     Admit Date:  4/28/2017       Discharge Date:   5/6/2017    Attending Physician:  Robert Calvillo     Diagnoses (includes active and resolved):   Active Problems:    Anal carcinoma (CMS-HCC) POA: Yes    DMII (diabetes mellitus, type 2) (CMS-HCC) POA: Yes    HLD (hyperlipidemia) POA: Yes    HTN (hypertension) POA: Yes    Intractable abdominal pain POA: Unknown    Constipation POA: Unknown  Resolved Problems:    * No resolved hospital problems. *     Also:   Anemia     Hospital Summary (Brief Narrative):       61-year-old female w/h/o multiple cancers s/p treatment for rectal cancer in August p/w abdominal pain.  She was seen at [facility] and imaging apparently found new lesions in her abdomen including liver masses.  She was seen by Dr. Sue yesterday and plans were to continue outpatient workup for a potential recurrent metastatic disease.    However, given her intractable abdominal pain, and she was admitted. GI was consulted. Patient was taken for EGD and had biopsy. The biopsy eventually returned benign. H pylori was negative. Patient then went for liver nodule biopsy. This showed squamous carcinoma consistent with metastasis from her anal cell carcinoma. Her pain was improved with long-acting MS Contin. She was thus able to be discharged home. She is to follow-up with Dr. Sue outpatient for further determination of treatment.   Narcotic history was researched for this patient. The last fill was in 4/2017.     Consultants:      yvan Quintero    Procedures:        segment 2 liver nodule biopsy  Esophagogastroduodenoscopy with biopsies    Discharge Medications:           Medication List      START taking these medications       Instructions    morphine ER 15 MG Tbcr tablet   Last time this was given:  15 mg on 5/6/2017  8:25 AM   Commonly known as:  MS CONTIN    Take 1 Tab by mouth 3 times a day.   Dose:  15 mg         CONTINUE taking these medications        Instructions    aspirin 81 MG Chew chewable tablet   Last time this was given:  81 mg on 5/6/2017  8:25 AM   Commonly known as:  ASA   Next Dose Due:  Tomorrow am      Take 81 mg by mouth every day.   Dose:  81 mg       atorvastatin 20 MG Tabs   Last time this was given:  20 mg on 5/5/2017  8:40 PM   Commonly known as:  LIPITOR   Next Dose Due:  Tonight      Take 20 mg by mouth every evening.   Dose:  20 mg       BREO ELLIPTA 100-25 MCG/INH Aepb   Generic drug:  Fluticasone Furoate-Vilanterol   Next Dose Due:  As needed      Inhale 1 Puff by mouth every day.   Dose:  1 Puff       carvedilol 25 MG Tabs   Last time this was given:  25 mg on 5/6/2017  8:25 AM   Commonly known as:  COREG   Next Dose Due:  Tonight around 8pm      Take 25 mg by mouth 2 times a day.   Dose:  25 mg       enalapril 20 MG tablet   Last time this was given:  20 mg on 5/5/2017  8:46 PM   Commonly known as:  VASOTEC   Next Dose Due:  Tonight      Take 20 mg by mouth 2 times a day.   Dose:  20 mg       metformin 500 MG Tabs   Commonly known as:  GLUCOPHAGE   Next Dose Due:  Daily      Take 500 mg by mouth every day.   Dose:  500 mg       oxycodone immediate-release 5 MG Tabs   Last time this was given:  5 mg on 5/6/2017 11:57 AM   Commonly known as:  ROXICODONE   Next Dose Due:  Around 8pm if needed    Take 1 Tab by mouth every 8 hours as needed for Severe Pain.   Dose:  5 mg       oyster shell calcium/vitamin D 250-125 MG-UNIT Tabs tablet   Next Dose Due:  Morning and night      Take 1 Tab by mouth 2 times a day.   Dose:  1 Tab       pantoprazole 40 MG Tbec   Commonly known as:  PROTONIX   Next Dose Due:  Daily      Take 40 mg by mouth every day.   Dose:  40 mg       polyethylene glycol 3350 Powd   Commonly known as:  MIRALAX   Next Dose Due:  Tomorrow      Take 17 g by mouth every day.   Dose:  17 g           Disposition:   Discharge home    Diet:   Diabetic    Activity:   As tolerated    Code status:   Full code    Primary Care Provider:     REMY Del Rio    Follow up appointment details :      PCP in 2 weeks  No follow-up provider specified.  Future Appointments  Date Time Provider Department Center   9/5/2017 10:00 AM KIEL Torres None       Pending Studies:        None    Time spent on discharge day patient visit: 46 minutes    #################################################    Interval history/exam for day of discharge:    Filed Vitals:    05/05/17 2039 05/06/17 0005 05/06/17 0400 05/06/17 0800   BP: 128/64  105/49 102/51   Pulse: 96  83 86   Temp: 37.9 °C (100.3 °F) 37.4 °C (99.4 °F) 36.6 °C (97.8 °F) 36.6 °C (97.8 °F)   Resp: 18  18 18   Height:       Weight:       SpO2: 93%  92% 90%     Weight/BMI: Body mass index is 30.53 kg/(m^2).  Pulse Oximetry: 90 %, O2 (LPM): 0, O2 Delivery: None (Room Air)    General:  NAD  CVS:  RRR  PULM:  CTAB, no respiratory distress  Abd: Distended with some tenderness to palpation but improved    Most Recent Labs:    Lab Results   Component Value Date/Time    WBC 8.9 05/06/2017 04:25 AM    RBC 2.80* 05/06/2017 04:25 AM    HEMOGLOBIN 8.2* 05/06/2017 04:25 AM    HEMATOCRIT 25.3* 05/06/2017 04:25 AM    MCV 90.4 05/06/2017 04:25 AM    MCH 29.3 05/06/2017 04:25 AM    MCHC 32.4* 05/06/2017 04:25 AM    MPV 9.3 05/06/2017 04:25 AM    NEUTROPHILS-POLYS 74.00* 05/01/2017 02:40 AM    LYMPHOCYTES 13.20* 05/01/2017 02:40 AM    MONOCYTES 10.90 05/01/2017 02:40 AM    EOSINOPHILS 1.50 05/01/2017 02:40 AM    BASOPHILS 0.10 05/01/2017 02:40 AM    HYPOCHROMIA 1+ 08/24/2016 01:26 AM    ANISOCYTOSIS 1+ 08/24/2016 01:26 AM      Lab Results   Component Value Date/Time    SODIUM 136 05/06/2017 04:25 AM    POTASSIUM 3.8 05/06/2017 04:25 AM    CHLORIDE 101 05/06/2017 04:25 AM    CO2 26 05/06/2017 04:25 AM    GLUCOSE 113* 05/06/2017 04:25 AM    BUN 6* 05/06/2017 04:25 AM    CREATININE 0.76 05/06/2017 04:25 AM      Lab Results   Component Value Date/Time    ALT(SGPT) 11 04/30/2017 03:10 AM    AST(SGOT) 14 04/30/2017  03:10 AM    ALKALINE PHOSPHATASE 91 04/30/2017 03:10 AM    TOTAL BILIRUBIN 0.6 04/30/2017 03:10 AM    LIPASE 22 04/28/2017 09:16 PM    ALBUMIN 3.0* 04/30/2017 03:10 AM    GLOBULIN 3.2 04/30/2017 03:10 AM    INR 1.15* 04/28/2017 09:16 PM     Lab Results   Component Value Date/Time    PT 15.1* 04/28/2017 09:16 PM    INR 1.15* 04/28/2017 09:16 PM        Imaging/ Testing:      CT-NEEDLE BX-LIVER   Final Result      CT-guided segment 2 hepatic nodule biopsy.         CT-ABDOMEN-PELVIS WITH   Final Result      1.  New hepatic metastases.      2.  Fluid or soft tissue mass along the greater curvature of the stomach.      3.  No evidence of bowel dilatation.      4.  Stranding in the mesentery with trace ascites.      5.  Stable nonspecific 15 mm right adrenal gland nodule.          Instructions:      The patient was instructed to return to the ER in the event of worsening symptoms. I have counseled the patient on the importance of compliance and the patient has agreed to proceed with all medical recommendations and follow up plan indicated above.   The patient understands that all medications come with benefits and risks. Risks may include permanent injury or death and these risks can be minimized with close reassessment and monitoring.

## 2017-05-06 NOTE — PROGRESS NOTES
Pain not well managed with Oxy 10. Oxy 15 given with positive results. ER morphine added to MAR .patient educated regarding new medication.

## 2017-05-06 NOTE — PROGRESS NOTES
Prescriptions given to patient. Reviewed discharge instructions. Patient to make appointment to follow up. Patient verbalizes understanding. Port removed. Patient discharged home with family by wheelchair with transport. Patient verbalizes understanding of follow up plan. Patient verbalizes understanding of new prescriptions.

## 2017-05-07 ENCOUNTER — PATIENT OUTREACH (OUTPATIENT)
Dept: HEALTH INFORMATION MANAGEMENT | Facility: OTHER | Age: 62
End: 2017-05-07

## 2017-05-07 NOTE — PROGRESS NOTES
· Placed discharge outreach phone call to patient s/p hospital discharge 5/6/17.  Left voicemail with my contact information and instructions to return my phone call.    · 5/9/17 at 3:34 PM--Second attempt at discharge outreach, spoke with pt, discharge outreach completed.

## 2017-05-11 ENCOUNTER — HOSPITAL ENCOUNTER (OUTPATIENT)
Facility: MEDICAL CENTER | Age: 62
End: 2017-05-11
Attending: INTERNAL MEDICINE
Payer: MEDICAID

## 2017-05-11 ENCOUNTER — HOSPITAL ENCOUNTER (OUTPATIENT)
Dept: CARDIOLOGY | Facility: MEDICAL CENTER | Age: 62
End: 2017-05-11
Attending: INTERNAL MEDICINE
Payer: MEDICAID

## 2017-05-11 PROBLEM — Z00.6 RESEARCH STUDY PATIENT: Status: ACTIVE | Noted: 2017-05-11

## 2017-05-11 LAB
ALBUMIN SERPL BCP-MCNC: 3.2 G/DL (ref 3.2–4.9)
ALBUMIN/GLOB SERPL: 0.7 G/DL
ALP SERPL-CCNC: 94 U/L (ref 30–99)
ALT SERPL-CCNC: 10 U/L (ref 2–50)
ANION GAP SERPL CALC-SCNC: 9 MMOL/L (ref 0–11.9)
AST SERPL-CCNC: 19 U/L (ref 12–45)
BASOPHILS # BLD AUTO: 0.4 % (ref 0–1.8)
BASOPHILS # BLD: 0.03 K/UL (ref 0–0.12)
BILIRUB SERPL-MCNC: 0.5 MG/DL (ref 0.1–1.5)
BUN SERPL-MCNC: 4 MG/DL (ref 8–22)
CALCIUM SERPL-MCNC: 9.5 MG/DL (ref 8.5–10.5)
CHLORIDE SERPL-SCNC: 101 MMOL/L (ref 96–112)
CO2 SERPL-SCNC: 28 MMOL/L (ref 20–33)
CREAT SERPL-MCNC: 0.62 MG/DL (ref 0.5–1.4)
EKG IMPRESSION: NORMAL
EOSINOPHIL # BLD AUTO: 0.05 K/UL (ref 0–0.51)
EOSINOPHIL NFR BLD: 0.7 % (ref 0–6.9)
ERYTHROCYTE [DISTWIDTH] IN BLOOD BY AUTOMATED COUNT: 51.2 FL (ref 35.9–50)
GFR SERPL CREATININE-BSD FRML MDRD: >60 ML/MIN/1.73 M 2
GLOBULIN SER CALC-MCNC: 4.6 G/DL (ref 1.9–3.5)
GLUCOSE SERPL-MCNC: 103 MG/DL (ref 65–99)
HCT VFR BLD AUTO: 30.3 % (ref 37–47)
HGB BLD-MCNC: 9.3 G/DL (ref 12–16)
IMM GRANULOCYTES # BLD AUTO: 0.05 K/UL (ref 0–0.11)
IMM GRANULOCYTES NFR BLD AUTO: 0.7 % (ref 0–0.9)
LDH SERPL-CCNC: 267 U/L (ref 107–266)
LYMPHOCYTES # BLD AUTO: 0.81 K/UL (ref 1–4.8)
LYMPHOCYTES NFR BLD: 10.9 % (ref 22–41)
MAGNESIUM SERPL-MCNC: 2 MG/DL (ref 1.5–2.5)
MCH RBC QN AUTO: 28.6 PG (ref 27–33)
MCHC RBC AUTO-ENTMCNC: 30.7 G/DL (ref 33.6–35)
MCV RBC AUTO: 93.2 FL (ref 81.4–97.8)
MONOCYTES # BLD AUTO: 0.63 K/UL (ref 0–0.85)
MONOCYTES NFR BLD AUTO: 8.5 % (ref 0–13.4)
NEUTROPHILS # BLD AUTO: 5.87 K/UL (ref 2–7.15)
NEUTROPHILS NFR BLD: 78.8 % (ref 44–72)
NRBC # BLD AUTO: 0.03 K/UL
NRBC BLD AUTO-RTO: 0.4 /100 WBC
PHOSPHATE SERPL-MCNC: 3.1 MG/DL (ref 2.5–4.5)
PLATELET # BLD AUTO: 717 K/UL (ref 164–446)
PMV BLD AUTO: 9.9 FL (ref 9–12.9)
POTASSIUM SERPL-SCNC: 4 MMOL/L (ref 3.6–5.5)
PROT SERPL-MCNC: 7.8 G/DL (ref 6–8.2)
RBC # BLD AUTO: 3.25 M/UL (ref 4.2–5.4)
SODIUM SERPL-SCNC: 138 MMOL/L (ref 135–145)
WBC # BLD AUTO: 7.4 K/UL (ref 4.8–10.8)

## 2017-05-11 PROCEDURE — 93005 ELECTROCARDIOGRAM TRACING: CPT | Performed by: NURSE PRACTITIONER

## 2017-05-11 PROCEDURE — 93010 ELECTROCARDIOGRAM REPORT: CPT | Performed by: INTERNAL MEDICINE

## 2017-05-11 PROCEDURE — 83615 LACTATE (LD) (LDH) ENZYME: CPT

## 2017-05-11 PROCEDURE — 84100 ASSAY OF PHOSPHORUS: CPT

## 2017-05-11 PROCEDURE — 80053 COMPREHEN METABOLIC PANEL: CPT

## 2017-05-11 PROCEDURE — 85025 COMPLETE CBC W/AUTO DIFF WBC: CPT

## 2017-05-11 PROCEDURE — 83735 ASSAY OF MAGNESIUM: CPT

## 2017-05-25 ENCOUNTER — HOSPITAL ENCOUNTER (OUTPATIENT)
Dept: RADIOLOGY | Facility: MEDICAL CENTER | Age: 62
End: 2017-05-25
Attending: INTERNAL MEDICINE
Payer: MEDICAID

## 2017-05-25 DIAGNOSIS — C21.0 MALIGNANT NEOPLASM OF ANUS (HCC): ICD-10-CM

## 2017-05-25 PROCEDURE — 71260 CT THORAX DX C+: CPT

## 2017-05-25 PROCEDURE — 700117 HCHG RX CONTRAST REV CODE 255: Performed by: INTERNAL MEDICINE

## 2017-05-25 RX ADMIN — IOHEXOL 100 ML: 350 INJECTION, SOLUTION INTRAVENOUS at 11:23

## 2017-06-05 ENCOUNTER — HOSPITAL ENCOUNTER (OUTPATIENT)
Dept: CARDIOLOGY | Facility: MEDICAL CENTER | Age: 62
End: 2017-06-05
Attending: INTERNAL MEDICINE
Payer: MEDICAID

## 2017-06-05 PROCEDURE — 93010 ELECTROCARDIOGRAM REPORT: CPT | Performed by: INTERNAL MEDICINE

## 2017-06-05 PROCEDURE — 93005 ELECTROCARDIOGRAM TRACING: CPT | Performed by: INTERNAL MEDICINE

## 2017-06-06 LAB — EKG IMPRESSION: NORMAL

## 2017-06-13 ENCOUNTER — HOSPITAL ENCOUNTER (OUTPATIENT)
Dept: LAB | Facility: MEDICAL CENTER | Age: 62
End: 2017-06-13
Attending: INTERNAL MEDICINE
Payer: MEDICAID

## 2017-06-13 LAB
ALBUMIN SERPL BCP-MCNC: 4.1 G/DL (ref 3.2–4.9)
ALBUMIN/GLOB SERPL: 0.9 G/DL
ALP SERPL-CCNC: 137 U/L (ref 30–99)
ALT SERPL-CCNC: 13 U/L (ref 2–50)
ANION GAP SERPL CALC-SCNC: 9 MMOL/L (ref 0–11.9)
AST SERPL-CCNC: 19 U/L (ref 12–45)
BASOPHILS # BLD AUTO: 0.5 % (ref 0–1.8)
BASOPHILS # BLD: 0.03 K/UL (ref 0–0.12)
BILIRUB SERPL-MCNC: 0.7 MG/DL (ref 0.1–1.5)
BUN SERPL-MCNC: 9 MG/DL (ref 8–22)
CALCIUM SERPL-MCNC: 9.9 MG/DL (ref 8.5–10.5)
CHLORIDE SERPL-SCNC: 99 MMOL/L (ref 96–112)
CO2 SERPL-SCNC: 29 MMOL/L (ref 20–33)
CREAT SERPL-MCNC: 0.7 MG/DL (ref 0.5–1.4)
EOSINOPHIL # BLD AUTO: 0.04 K/UL (ref 0–0.51)
EOSINOPHIL NFR BLD: 0.6 % (ref 0–6.9)
ERYTHROCYTE [DISTWIDTH] IN BLOOD BY AUTOMATED COUNT: 50.5 FL (ref 35.9–50)
GFR SERPL CREATININE-BSD FRML MDRD: >60 ML/MIN/1.73 M 2
GLOBULIN SER CALC-MCNC: 4.8 G/DL (ref 1.9–3.5)
GLUCOSE SERPL-MCNC: 145 MG/DL (ref 65–99)
HCT VFR BLD AUTO: 37.2 % (ref 37–47)
HGB BLD-MCNC: 11.7 G/DL (ref 12–16)
IMM GRANULOCYTES # BLD AUTO: 0.01 K/UL (ref 0–0.11)
IMM GRANULOCYTES NFR BLD AUTO: 0.2 % (ref 0–0.9)
LDH SERPL-CCNC: 224 U/L (ref 107–266)
LYMPHOCYTES # BLD AUTO: 1.22 K/UL (ref 1–4.8)
LYMPHOCYTES NFR BLD: 18.4 % (ref 22–41)
MAGNESIUM SERPL-MCNC: 1.8 MG/DL (ref 1.5–2.5)
MCH RBC QN AUTO: 28.4 PG (ref 27–33)
MCHC RBC AUTO-ENTMCNC: 31.5 G/DL (ref 33.6–35)
MCV RBC AUTO: 90.3 FL (ref 81.4–97.8)
MONOCYTES # BLD AUTO: 0.52 K/UL (ref 0–0.85)
MONOCYTES NFR BLD AUTO: 7.9 % (ref 0–13.4)
NEUTROPHILS # BLD AUTO: 4.8 K/UL (ref 2–7.15)
NEUTROPHILS NFR BLD: 72.4 % (ref 44–72)
NRBC # BLD AUTO: 0 K/UL
NRBC BLD AUTO-RTO: 0 /100 WBC
PHOSPHATE SERPL-MCNC: 3.1 MG/DL (ref 2.5–4.5)
PLATELET # BLD AUTO: 430 K/UL (ref 164–446)
PMV BLD AUTO: 9.7 FL (ref 9–12.9)
POTASSIUM SERPL-SCNC: 4.3 MMOL/L (ref 3.6–5.5)
PROT SERPL-MCNC: 8.9 G/DL (ref 6–8.2)
RBC # BLD AUTO: 4.12 M/UL (ref 4.2–5.4)
SODIUM SERPL-SCNC: 137 MMOL/L (ref 135–145)
WBC # BLD AUTO: 6.6 K/UL (ref 4.8–10.8)

## 2017-06-13 PROCEDURE — 83615 LACTATE (LD) (LDH) ENZYME: CPT

## 2017-06-13 PROCEDURE — 85025 COMPLETE CBC W/AUTO DIFF WBC: CPT

## 2017-06-13 PROCEDURE — 36415 COLL VENOUS BLD VENIPUNCTURE: CPT

## 2017-06-13 PROCEDURE — 80053 COMPREHEN METABOLIC PANEL: CPT

## 2017-06-13 PROCEDURE — 84100 ASSAY OF PHOSPHORUS: CPT

## 2017-06-13 PROCEDURE — 83735 ASSAY OF MAGNESIUM: CPT

## 2017-06-18 ENCOUNTER — HOSPITAL ENCOUNTER (OUTPATIENT)
Facility: MEDICAL CENTER | Age: 62
End: 2017-06-19
Attending: EMERGENCY MEDICINE | Admitting: HOSPITALIST
Payer: MEDICAID

## 2017-06-18 ENCOUNTER — APPOINTMENT (OUTPATIENT)
Dept: RADIOLOGY | Facility: MEDICAL CENTER | Age: 62
End: 2017-06-18
Attending: EMERGENCY MEDICINE
Payer: MEDICAID

## 2017-06-18 ENCOUNTER — RESOLUTE PROFESSIONAL BILLING HOSPITAL PROF FEE (OUTPATIENT)
Dept: HOSPITALIST | Facility: MEDICAL CENTER | Age: 62
End: 2017-06-18
Payer: MEDICAID

## 2017-06-18 DIAGNOSIS — R11.10 VOMITING AND DIARRHEA: ICD-10-CM

## 2017-06-18 DIAGNOSIS — C80.1 CANCER (HCC): ICD-10-CM

## 2017-06-18 DIAGNOSIS — R19.7 VOMITING AND DIARRHEA: ICD-10-CM

## 2017-06-18 DIAGNOSIS — T50.905A ADVERSE DRUG EFFECT, INITIAL ENCOUNTER: ICD-10-CM

## 2017-06-18 DIAGNOSIS — E86.0 DEHYDRATION: ICD-10-CM

## 2017-06-18 PROBLEM — E87.6 HYPOKALEMIA: Status: ACTIVE | Noted: 2017-06-18

## 2017-06-18 PROBLEM — C20 RECTAL CANCER (HCC): Status: ACTIVE | Noted: 2017-06-18

## 2017-06-18 PROBLEM — E87.20 LACTIC ACIDOSIS: Status: ACTIVE | Noted: 2017-06-18

## 2017-06-18 PROBLEM — C50.919 BREAST CANCER (HCC): Status: ACTIVE | Noted: 2017-06-18

## 2017-06-18 LAB
ALBUMIN SERPL BCP-MCNC: 3.6 G/DL (ref 3.2–4.9)
ALBUMIN/GLOB SERPL: 0.8 G/DL
ALP SERPL-CCNC: 156 U/L (ref 30–99)
ALT SERPL-CCNC: 10 U/L (ref 2–50)
ANION GAP SERPL CALC-SCNC: 11 MMOL/L (ref 0–11.9)
APTT PPP: 28.8 SEC (ref 24.7–36)
AST SERPL-CCNC: 17 U/L (ref 12–45)
BASOPHILS # BLD AUTO: 0.3 % (ref 0–1.8)
BASOPHILS # BLD: 0.02 K/UL (ref 0–0.12)
BILIRUB SERPL-MCNC: 1.3 MG/DL (ref 0.1–1.5)
BUN SERPL-MCNC: 22 MG/DL (ref 8–22)
CALCIUM SERPL-MCNC: 9.4 MG/DL (ref 8.5–10.5)
CHLORIDE SERPL-SCNC: 103 MMOL/L (ref 96–112)
CO2 SERPL-SCNC: 26 MMOL/L (ref 20–33)
CREAT SERPL-MCNC: 0.78 MG/DL (ref 0.5–1.4)
EOSINOPHIL # BLD AUTO: 0.01 K/UL (ref 0–0.51)
EOSINOPHIL NFR BLD: 0.1 % (ref 0–6.9)
ERYTHROCYTE [DISTWIDTH] IN BLOOD BY AUTOMATED COUNT: 48.1 FL (ref 35.9–50)
GFR SERPL CREATININE-BSD FRML MDRD: >60 ML/MIN/1.73 M 2
GLOBULIN SER CALC-MCNC: 4.6 G/DL (ref 1.9–3.5)
GLUCOSE SERPL-MCNC: 141 MG/DL (ref 65–99)
HCT VFR BLD AUTO: 39 % (ref 37–47)
HGB BLD-MCNC: 12.5 G/DL (ref 12–16)
IMM GRANULOCYTES # BLD AUTO: 0.01 K/UL (ref 0–0.11)
IMM GRANULOCYTES NFR BLD AUTO: 0.1 % (ref 0–0.9)
INR PPP: 1.11 (ref 0.87–1.13)
LACTATE BLD-SCNC: 2.9 MMOL/L (ref 0.5–2)
LIPASE SERPL-CCNC: 32 U/L (ref 11–82)
LYMPHOCYTES # BLD AUTO: 1 K/UL (ref 1–4.8)
LYMPHOCYTES NFR BLD: 14.9 % (ref 22–41)
MCH RBC QN AUTO: 28.1 PG (ref 27–33)
MCHC RBC AUTO-ENTMCNC: 32.1 G/DL (ref 33.6–35)
MCV RBC AUTO: 87.6 FL (ref 81.4–97.8)
MONOCYTES # BLD AUTO: 0.28 K/UL (ref 0–0.85)
MONOCYTES NFR BLD AUTO: 4.2 % (ref 0–13.4)
NEUTROPHILS # BLD AUTO: 5.38 K/UL (ref 2–7.15)
NEUTROPHILS NFR BLD: 80.4 % (ref 44–72)
NRBC # BLD AUTO: 0 K/UL
NRBC BLD AUTO-RTO: 0 /100 WBC
PLATELET # BLD AUTO: 367 K/UL (ref 164–446)
PMV BLD AUTO: 9.6 FL (ref 9–12.9)
POTASSIUM SERPL-SCNC: 3.3 MMOL/L (ref 3.6–5.5)
PROT SERPL-MCNC: 8.2 G/DL (ref 6–8.2)
PROTHROMBIN TIME: 14.7 SEC (ref 12–14.6)
RBC # BLD AUTO: 4.45 M/UL (ref 4.2–5.4)
SODIUM SERPL-SCNC: 140 MMOL/L (ref 135–145)
WBC # BLD AUTO: 6.7 K/UL (ref 4.8–10.8)

## 2017-06-18 PROCEDURE — 700105 HCHG RX REV CODE 258: Performed by: EMERGENCY MEDICINE

## 2017-06-18 PROCEDURE — 85610 PROTHROMBIN TIME: CPT

## 2017-06-18 PROCEDURE — 83690 ASSAY OF LIPASE: CPT

## 2017-06-18 PROCEDURE — 85730 THROMBOPLASTIN TIME PARTIAL: CPT

## 2017-06-18 PROCEDURE — A9270 NON-COVERED ITEM OR SERVICE: HCPCS | Performed by: HOSPITALIST

## 2017-06-18 PROCEDURE — 99220 PR INITIAL OBSERVATION CARE,LEVL III: CPT | Performed by: HOSPITALIST

## 2017-06-18 PROCEDURE — 99285 EMERGENCY DEPT VISIT HI MDM: CPT

## 2017-06-18 PROCEDURE — G0378 HOSPITAL OBSERVATION PER HR: HCPCS

## 2017-06-18 PROCEDURE — 96361 HYDRATE IV INFUSION ADD-ON: CPT

## 2017-06-18 PROCEDURE — 700111 HCHG RX REV CODE 636 W/ 250 OVERRIDE (IP): Performed by: EMERGENCY MEDICINE

## 2017-06-18 PROCEDURE — 83605 ASSAY OF LACTIC ACID: CPT

## 2017-06-18 PROCEDURE — 80053 COMPREHEN METABOLIC PANEL: CPT

## 2017-06-18 PROCEDURE — 700111 HCHG RX REV CODE 636 W/ 250 OVERRIDE (IP): Performed by: HOSPITALIST

## 2017-06-18 PROCEDURE — 96374 THER/PROPH/DIAG INJ IV PUSH: CPT

## 2017-06-18 PROCEDURE — 71010 DX-CHEST-PORTABLE (1 VIEW): CPT

## 2017-06-18 PROCEDURE — 700102 HCHG RX REV CODE 250 W/ 637 OVERRIDE(OP): Performed by: HOSPITALIST

## 2017-06-18 PROCEDURE — 93005 ELECTROCARDIOGRAM TRACING: CPT | Performed by: EMERGENCY MEDICINE

## 2017-06-18 PROCEDURE — 85025 COMPLETE CBC W/AUTO DIFF WBC: CPT

## 2017-06-18 PROCEDURE — 700101 HCHG RX REV CODE 250: Performed by: HOSPITALIST

## 2017-06-18 PROCEDURE — 96375 TX/PRO/DX INJ NEW DRUG ADDON: CPT

## 2017-06-18 RX ORDER — SODIUM CHLORIDE 9 MG/ML
1000 INJECTION, SOLUTION INTRAVENOUS ONCE
Status: COMPLETED | OUTPATIENT
Start: 2017-06-18 | End: 2017-06-18

## 2017-06-18 RX ORDER — POLYETHYLENE GLYCOL 3350 17 G/17G
1 POWDER, FOR SOLUTION ORAL
Status: DISCONTINUED | OUTPATIENT
Start: 2017-06-18 | End: 2017-06-19 | Stop reason: HOSPADM

## 2017-06-18 RX ORDER — MORPHINE SULFATE 15 MG/1
15 TABLET, FILM COATED, EXTENDED RELEASE ORAL 3 TIMES DAILY
Status: DISCONTINUED | OUTPATIENT
Start: 2017-06-18 | End: 2017-06-19 | Stop reason: HOSPADM

## 2017-06-18 RX ORDER — ONDANSETRON 2 MG/ML
8 INJECTION INTRAMUSCULAR; INTRAVENOUS ONCE
Status: COMPLETED | OUTPATIENT
Start: 2017-06-18 | End: 2017-06-18

## 2017-06-18 RX ORDER — OMEPRAZOLE 20 MG/1
20 CAPSULE, DELAYED RELEASE ORAL DAILY
Status: DISCONTINUED | OUTPATIENT
Start: 2017-06-19 | End: 2017-06-19 | Stop reason: HOSPADM

## 2017-06-18 RX ORDER — SODIUM CHLORIDE AND POTASSIUM CHLORIDE 150; 900 MG/100ML; MG/100ML
INJECTION, SOLUTION INTRAVENOUS CONTINUOUS
Status: DISCONTINUED | OUTPATIENT
Start: 2017-06-18 | End: 2017-06-19 | Stop reason: HOSPADM

## 2017-06-18 RX ORDER — ENALAPRIL MALEATE 10 MG/1
20 TABLET ORAL 2 TIMES DAILY
Status: DISCONTINUED | OUTPATIENT
Start: 2017-06-18 | End: 2017-06-19 | Stop reason: HOSPADM

## 2017-06-18 RX ORDER — CARVEDILOL 25 MG/1
25 TABLET ORAL 2 TIMES DAILY WITH MEALS
Status: DISCONTINUED | OUTPATIENT
Start: 2017-06-19 | End: 2017-06-19 | Stop reason: HOSPADM

## 2017-06-18 RX ORDER — ONDANSETRON 4 MG/1
4 TABLET, ORALLY DISINTEGRATING ORAL EVERY 4 HOURS PRN
Status: DISCONTINUED | OUTPATIENT
Start: 2017-06-18 | End: 2017-06-19 | Stop reason: HOSPADM

## 2017-06-18 RX ORDER — ASPIRIN 81 MG/1
81 TABLET, CHEWABLE ORAL DAILY
Status: DISCONTINUED | OUTPATIENT
Start: 2017-06-19 | End: 2017-06-19 | Stop reason: HOSPADM

## 2017-06-18 RX ORDER — PROMETHAZINE HYDROCHLORIDE 25 MG/1
12.5-25 TABLET ORAL EVERY 4 HOURS PRN
Status: DISCONTINUED | OUTPATIENT
Start: 2017-06-18 | End: 2017-06-19 | Stop reason: HOSPADM

## 2017-06-18 RX ORDER — BISACODYL 10 MG
10 SUPPOSITORY, RECTAL RECTAL
Status: DISCONTINUED | OUTPATIENT
Start: 2017-06-18 | End: 2017-06-19 | Stop reason: HOSPADM

## 2017-06-18 RX ORDER — AMOXICILLIN 250 MG
2 CAPSULE ORAL 2 TIMES DAILY
Status: DISCONTINUED | OUTPATIENT
Start: 2017-06-18 | End: 2017-06-19 | Stop reason: HOSPADM

## 2017-06-18 RX ORDER — ATORVASTATIN CALCIUM 20 MG/1
20 TABLET, FILM COATED ORAL NIGHTLY
Status: DISCONTINUED | OUTPATIENT
Start: 2017-06-18 | End: 2017-06-19 | Stop reason: HOSPADM

## 2017-06-18 RX ORDER — PROMETHAZINE HYDROCHLORIDE 25 MG/1
12.5-25 SUPPOSITORY RECTAL EVERY 4 HOURS PRN
Status: DISCONTINUED | OUTPATIENT
Start: 2017-06-18 | End: 2017-06-19 | Stop reason: HOSPADM

## 2017-06-18 RX ORDER — ONDANSETRON 2 MG/ML
4 INJECTION INTRAMUSCULAR; INTRAVENOUS EVERY 4 HOURS PRN
Status: DISCONTINUED | OUTPATIENT
Start: 2017-06-18 | End: 2017-06-19 | Stop reason: HOSPADM

## 2017-06-18 RX ORDER — HEPARIN SODIUM 5000 [USP'U]/ML
5000 INJECTION, SOLUTION INTRAVENOUS; SUBCUTANEOUS EVERY 8 HOURS
Status: DISCONTINUED | OUTPATIENT
Start: 2017-06-18 | End: 2017-06-19 | Stop reason: HOSPADM

## 2017-06-18 RX ORDER — OXYCODONE HYDROCHLORIDE 5 MG/1
5 TABLET ORAL EVERY 8 HOURS PRN
Status: DISCONTINUED | OUTPATIENT
Start: 2017-06-18 | End: 2017-06-19 | Stop reason: HOSPADM

## 2017-06-18 RX ADMIN — SODIUM CHLORIDE 1000 ML: 9 INJECTION, SOLUTION INTRAVENOUS at 17:56

## 2017-06-18 RX ADMIN — ONDANSETRON 8 MG: 2 INJECTION INTRAMUSCULAR; INTRAVENOUS at 17:54

## 2017-06-18 RX ADMIN — MORPHINE SULFATE 15 MG: 15 TABLET, EXTENDED RELEASE ORAL at 23:02

## 2017-06-18 RX ADMIN — ONDANSETRON 4 MG: 4 TABLET, ORALLY DISINTEGRATING ORAL at 23:01

## 2017-06-18 RX ADMIN — POTASSIUM CHLORIDE AND SODIUM CHLORIDE: 900; 150 INJECTION, SOLUTION INTRAVENOUS at 21:35

## 2017-06-18 RX ADMIN — SODIUM CHLORIDE 1000 ML: 9 INJECTION, SOLUTION INTRAVENOUS at 19:01

## 2017-06-18 RX ADMIN — HEPARIN SODIUM 5000 UNITS: 5000 INJECTION, SOLUTION INTRAVENOUS; SUBCUTANEOUS at 23:04

## 2017-06-18 RX ADMIN — ATORVASTATIN CALCIUM 20 MG: 20 TABLET, FILM COATED ORAL at 23:02

## 2017-06-18 RX ADMIN — PROCHLORPERAZINE EDISYLATE 10 MG: 5 INJECTION INTRAMUSCULAR; INTRAVENOUS at 21:02

## 2017-06-18 RX ADMIN — ENALAPRIL MALEATE 20 MG: 10 TABLET ORAL at 23:03

## 2017-06-18 ASSESSMENT — LIFESTYLE VARIABLES
ALCOHOL_USE: NO
EVER_SMOKED: YES

## 2017-06-18 ASSESSMENT — PAIN SCALES - GENERAL
PAINLEVEL_OUTOF10: 0
PAINLEVEL_OUTOF10: 2
PAINLEVEL_OUTOF10: 2

## 2017-06-18 NOTE — IP AVS SNAPSHOT
" <p align=\"LEFT\"><IMG SRC=\"//[email or web address]$/[email or web address]\" alt=\"Image\" WIDTH=\"50%\" HEIGHT=\"200\" BORDER=\"\"></p>                   Name:Marilyn Strange  Medical Record Number:[id number]  CSN: [id number]    YOB: 1955   Age: 61 y.o.  Sex: female  HT:1.702 m (5' 7\") WT: 79.379 kg (175 lb)          Admit Date: 6/18/2017     Discharge Date:   Today's Date: 6/19/2017  Attending Doctor:  Chris Hael M.D.                  Allergies:  Review of patient's allergies indicates no known allergies.          Your appointments     Jun 23, 2017  1:30 PM   EST Port Flush / Central Line Care with RN 5   Infusion Services ([facility])    [address] [place] [address] [address]  [facility] [id number] [phone]            Sep 05, 2017 10:00 AM   Follow Up with Jose Raul Magdaleno M.D.   [facility] Radiation Therapy (--)    [address] [place] [address]   [phone]              Follow-up Information     1. Follow up with REMY Del Rio.    Specialty:  Family Medicine    Contact information    [address]  [facility] [id number] [phone]          2. Follow up with Alfa Sue M.D.. Schedule an appointment as soon as possible for a visit in 1 week.    Specialty:  Oncology    Why:  follow up    Contact information    [address]  [address]  [facility] [id number] [phone]           Medication List      Take these Medications        Instructions    aspirin 81 MG Chew chewable tablet   Commonly known as:  ASA    Take 81 mg by mouth every day.   Dose:  81 mg       atorvastatin 20 MG Tabs   Commonly known as:  LIPITOR    Take 20 mg by mouth every evening.   Dose:  20 mg       BREO ELLIPTA 100-25 MCG/INH Aepb   Generic drug:  Fluticasone Furoate-Vilanterol    Inhale 1 Puff by mouth every day.   Dose:  1 Puff       carvedilol 25 MG Tabs   Commonly known as:  COREG    Take 25 mg by mouth 2 times a day.   Dose:  25 mg       enalapril 20 MG tablet   Commonly known as:  VASOTEC    Take 20 mg by mouth 2 times a day.   Dose:  " 20 mg       metformin 500 MG Tabs   Commonly known as:  GLUCOPHAGE    Take 500 mg by mouth every day.   Dose:  500 mg       morphine ER 15 MG Tbcr tablet   Commonly known as:  MS CONTIN    Take 1 Tab by mouth 3 times a day.   Dose:  15 mg       NON SPECIFIED    Take 2 Caps by mouth every bedtime. Investigational medication [id number]   Dose:  2 Cap       ondansetron 4 MG Tbdp   Commonly known as:  ZOFRAN ODT    Take 1 Tab by mouth every four hours as needed for Nausea/Vomiting (give PO if no IV route available).   Dose:  4 mg       oxycodone immediate-release 5 MG Tabs   Commonly known as:  ROXICODONE    Take 1 Tab by mouth every 8 hours as needed for Severe Pain.   Dose:  5 mg       oyster shell calcium/vitamin D 250-125 MG-UNIT Tabs tablet    Take 1 Tab by mouth 2 times a day.   Dose:  1 Tab       pantoprazole 40 MG Tbec   Commonly known as:  PROTONIX    Take 40 mg by mouth every day.   Dose:  40 mg       * promethazine 12.5 MG Supp   Commonly known as:  PHENERGAN    Insert 2 Suppositories in rectum every 6 hours as needed for Nausea/Vomiting (if no relief from ondansetron or if ondansetron is not ordered or if unable to tolerate PO).   Dose:  25 mg       * promethazine 12.5 MG tablet   Commonly known as:  PHENERGAN    Take 2 Tabs by mouth every 6 hours as needed for Nausea/Vomiting (if no relief from ondansetron or if ondansetron is not ordered).   Dose:  25 mg       * Notice:  This list has 2 medication(s) that are the same as other medications prescribed for you. Read the directions carefully, and ask your doctor or other care provider to review them with you.

## 2017-06-18 NOTE — IP AVS SNAPSHOT
C2FO Access Code: [email or web address]  Expires: 7/13/2017  1:26 PM    C2FO  A secure, online tool to manage your health information     [employer]’s C2FO® is a secure, online tool that connects you to your personalized health information from the privacy of your home -- day or night - making it very easy for you to manage your healthcare. Once the activation process is completed, you can even access your medical information using the C2FO isidoro, which is available for free in the Apple Isidoro store or Google Play store.     C2FO provides the following levels of access (as shown below):   My Chart Features   [facility] Primary Care Doctor [facility]  Specialists [facility]  Urgent  Care Non-[facility]  Primary Care  Doctor   Email your healthcare team securely and privately 24/7 X X X X   Manage appointments: schedule your next appointment; view details of past/upcoming appointments X      Request prescription refills. X      View recent personal medical records, including lab and immunizations X X X X   View health record, including health history, allergies, medications X X X X   Read reports about your outpatient visits, procedures, consult and ER notes X X X X   See your discharge summary, which is a recap of your hospital and/or ER visit that includes your diagnosis, lab results, and care plan. X X       How to register for C2FO:  1. Go to  [email or web address].  2. Click on the Sign Up Now box, which takes you to the New Member Sign Up page. You will need to provide the following information:  a. Enter your C2FO Access Code exactly as it appears at the top of this page. (You will not need to use this code after you’ve completed the sign-up process. If you do not sign up before the expiration date, you must request a new code.)   b. Enter your date of birth.   c. Enter your home email address.   d. Click Submit, and follow the next screen’s instructions.  3. Create a C2FO ID. This will be your [employer]  login ID and cannot be changed, so think of one that is secure and easy to remember.  4. Create a "[employer] password. You can change your password at any time.  5. Enter your Password Reset Question and Answer. This can be used at a later time if you forget your password.   6. Enter your e-mail address. This allows you to receive e-mail notifications when new information is available in "[employer].  7. Click Sign Up. You can now view your health information.    For assistance activating your "[employer] account, call [phone]

## 2017-06-18 NOTE — IP AVS SNAPSHOT
6/19/2017    Charissahortencia Cox Strange  [address]  [place] [address]    Dear Marilyn:    [facility] wants to ensure your discharge home is safe and you or your loved ones have had all of your questions answered regarding your care after you leave the hospital.    Below is a list of resources and contact information should you have any questions regarding your hospital stay, follow-up instructions, or active medical symptoms.    Questions or Concerns Regarding… Contact   Medical Questions Related to Your Discharge  (7 days a week, 8am-5pm) Contact a Nurse Care Coordinator   [phone]   Medical Questions Not Related to Your Discharge  (24 hours a day / 7 days a week)  Contact the Nurse Health Line   [phone]    Medications or Discharge Instructions Refer to your discharge packet   or contact your [facility] Primary Care Provider   [phone]   Follow-up Appointment(s) Schedule your appointment via [employer]   or contact Scheduling [phone]   Billing Review your statement via [employer]  or contact Billing [phone]   Medical Records Review your records via [employer]   or contact Medical Records [phone]     You may receive a telephone call within two days of discharge. This call is to make certain you understand your discharge instructions and have the opportunity to have any questions answered. You can also easily access your medical information, test results and upcoming appointments via the [employer] free online health management tool. You can learn more and sign up at [employer]. For assistance setting up your [employer] account, please call [phone].    Once again, we want to ensure your discharge home is safe and that you have a clear understanding of any next steps in your care. If you have any questions or concerns, please do not hesitate to contact us, we are here for you. Thank you for choosing [facility] for your healthcare needs.    Sincerely,    Your [facility] Healthcare Team

## 2017-06-18 NOTE — IP AVS SNAPSHOT
" Home Care Instructions                                                                                                                  Name:Marilyn Strange  Medical Record Number:[id number]  CSN: [id number]    YOB: 1955   Age: 61 y.o.  Sex: female  HT:1.702 m (5' 7\") WT: 79.379 kg (175 lb)          Admit Date: 6/18/2017     Discharge Date:   Today's Date: 6/19/2017  Attending Doctor:  Chris Hale M.D.                  Allergies:  Review of patient's allergies indicates no known allergies.            Discharge Instructions       Discharge Instructions    Discharged to home by car with relative. Discharged via wheelchair, hospital escort: Yes.  Special equipment needed: Not Applicable    Be sure to schedule a follow-up appointment with your primary care doctor or any specialists as instructed.     Discharge Plan:   Diet Plan: Discussed  Activity Level: Discussed  Confirmed Follow up Appointment: Patient to Call and Schedule Appointment  Confirmed Symptoms Management: Discussed  Medication Reconciliation Updated: Yes  Influenza Vaccine Indication: Not indicated: Previously immunized this influenza season and > 8 years of age    I understand that a diet low in cholesterol, fat, and sodium is recommended for good health. Unless I have been given specific instructions below for another diet, I accept this instruction as my diet prescription.   Other diet: Return to regular diet when no longer nauseas    Special Instructions:   See Dr. Sue in next 1-2 weeks.  Heart healthy diet.  Activity as able.    · Is patient discharged on Warfarin / Coumadin?   No     · Is patient Post Blood Transfusion?  No    Depression / Suicide Risk    As you are discharged from this [facility] facility, it is important to learn how to keep safe from harming yourself.    Recognize the warning signs:  · Abrupt changes in personality, positive or negative- including increase in energy   · Giving away possessions  · Change in eating " patterns- significant weight changes-  positive or negative  · Change in sleeping patterns- unable to sleep or sleeping all the time   · Unwillingness or inability to communicate  · Depression  · Unusual sadness, discouragement and loneliness  · Talk of wanting to die  · Neglect of personal appearance   · Rebelliousness- reckless behavior  · Withdrawal from people/activities they love  · Confusion- inability to concentrate     If you or a loved one observes any of these behaviors or has concerns about self-harm, here's what you can do:  · Talk about it- your feelings and reasons for harming yourself  · Remove any means that you might use to hurt yourself (examples: pills, rope, extension cords, firearm)  · Get professional help from the community (Mental Health, Substance Abuse, psychological counseling)  · Do not be alone:Call your Safe Contact- someone whom you trust who will be there for you.  · Call your local CRISIS HOTLINE 667-4185 or 577-251-1083  · Call your local Children's Mobile Crisis Response Team [place] (120) 231-8338 or wwwD'Shane Services  · Call the toll free National Suicide Prevention Hotlines   · National Suicide Prevention Lifeline 942-846-EZYF (8985)  · National Hope Line Network 800-SUICIDE (827-2734)    Dehydration, Adult  Dehydration means your body does not have as much fluid as it needs. Your kidneys, brain, and heart will not work properly without the right amount of fluids and salt.   HOME CARE  · Ask your doctor how to replace body fluid losses (rehydrate).  · Drink enough fluids to keep your pee (urine) clear or pale yellow.  · Drink small amounts of fluids often if you feel sick to your stomach (nauseous) or throw up (vomit).  · Eat like you normally do.  · Avoid:  ¨ Foods or drinks high in sugar.  ¨ Bubbly (carbonated) drinks.  ¨ Juice.  ¨ Very hot or cold fluids.  ¨ Drinks with caffeine.  ¨ Fatty, greasy foods.  ¨ Alcohol.  ¨ Tobacco.  ¨ Eating too much.  ¨ Gelatin  desserts.  · Wash your hands to avoid spreading germs (bacteria, viruses).  · Only take medicine as told by your doctor.  · Keep all doctor visits as told.  GET HELP RIGHT AWAY IF:   · You cannot drink something without throwing up.  · You get worse even with treatment.  · Your vomit has blood in it or looks greenish.  · Your poop (stool) has blood in it or looks black and tarry.  · You have not peed in 6 to 8 hours.  · You pee a small amount of very dark pee.  · You have a fever.  · You pass out (faint).  · You have belly (abdominal) pain that gets worse or stays in one spot (localizes).  · You have a rash, stiff neck, or bad headache.  · You get easily annoyed, sleepy, or are hard to wake up.  · You feel weak, dizzy, or very thirsty.  MAKE SURE YOU:   · Understand these instructions.  · Will watch your condition.  · Will get help right away if you are not doing well or get worse.     This information is not intended to replace advice given to you by your health care provider. Make sure you discuss any questions you have with your health care provider.     Document Released: 10/14/2010 Document Revised: 03/11/2013 Document Reviewed: 08/06/2012  [employer] Interactive Patient Education ©2016 [employer].      Your appointments     Jun 23, 2017  1:30 PM   EST Port Flush / Central Line Care with RN 5   Infusion Services ([facility])    [address]  [facility] [address]   [phone]            Sep 05, 2017 10:00 AM   Follow Up with Jose Raul Magdaleno M.D.   [facility] Radiation Therapy (--)    [address] [facility] [id number] [phone]              Follow-up Information     1. Follow up with REMY Del Rio.    Specialty:  Family Medicine    Contact information    [address]  [address]  [place] [id number]  [phone]          2. Follow up with Alfa Sue M.D.. Schedule an appointment as soon as possible for a visit in 1 week.    Specialty:  Oncology    Why:  follow up    Contact  information    [address] [place] [address] [address]  [place] [address]  [phone]           Discharge Medication Instructions:    Below are the medications your physician expects you to take upon discharge:    Review all your home medications and newly ordered medications with your doctor and/or pharmacist. Follow medication instructions as directed by your doctor and/or pharmacist.    Please keep your medication list with you and share with your physician.               Medication List      START taking these medications        Instructions    Morning Afternoon Evening Bedtime    ondansetron 4 MG Tbdp   Last time this was given:  4 mg on 6/18/2017 11:01 PM   Commonly known as:  ZOFRAN ODT        Take 1 Tab by mouth every four hours as needed for Nausea/Vomiting (give PO if no IV route available).   Dose:  4 mg                        * promethazine 12.5 MG Supp   Commonly known as:  PHENERGAN        Insert 2 Suppositories in rectum every 6 hours as needed for Nausea/Vomiting (if no relief from ondansetron or if ondansetron is not ordered or if unable to tolerate PO).   Dose:  25 mg                        * promethazine 12.5 MG tablet   Commonly known as:  PHENERGAN        Take 2 Tabs by mouth every 6 hours as needed for Nausea/Vomiting (if no relief from ondansetron or if ondansetron is not ordered).   Dose:  25 mg                        * Notice:  This list has 2 medication(s) that are the same as other medications prescribed for you. Read the directions carefully, and ask your doctor or other care provider to review them with you.      CONTINUE taking these medications        Instructions    Morning Afternoon Evening Bedtime    aspirin 81 MG Chew chewable tablet   Last time this was given:  81 mg on 6/19/2017  8:59 AM   Commonly known as:  ASA        Take 81 mg by mouth every day.   Dose:  81 mg                        atorvastatin 20 MG Tabs   Last time this was given:  20 mg on 6/18/2017 11:02 PM   Commonly known as:   LIPITOR        Take 20 mg by mouth every evening.   Dose:  20 mg                        BREO ELLIPTA 100-25 MCG/INH Aepb   Generic drug:  Fluticasone Furoate-Vilanterol        Inhale 1 Puff by mouth every day.   Dose:  1 Puff                        carvedilol 25 MG Tabs   Last time this was given:  25 mg on 6/19/2017  7:38 AM   Commonly known as:  COREG        Take 25 mg by mouth 2 times a day.   Dose:  25 mg                        enalapril 20 MG tablet   Last time this was given:  20 mg on 6/19/2017  8:59 AM   Commonly known as:  VASOTEC        Take 20 mg by mouth 2 times a day.   Dose:  20 mg                        metformin 500 MG Tabs   Last time this was given:  500 mg on 6/19/2017  8:59 AM   Commonly known as:  GLUCOPHAGE        Take 500 mg by mouth every day.   Dose:  500 mg                        morphine ER 15 MG Tbcr tablet   Last time this was given:  15 mg on 6/19/2017  8:59 AM   Commonly known as:  MS CONTIN        Take 1 Tab by mouth 3 times a day.   Dose:  15 mg                        NON SPECIFIED        Take 2 Caps by mouth every bedtime. Investigational medication [id number]   Dose:  2 Cap                        oxycodone immediate-release 5 MG Tabs   Commonly known as:  ROXICODONE        Take 1 Tab by mouth every 8 hours as needed for Severe Pain.   Dose:  5 mg                        oyster shell calcium/vitamin D 250-125 MG-UNIT Tabs tablet        Take 1 Tab by mouth 2 times a day.   Dose:  1 Tab                        pantoprazole 40 MG Tbec   Commonly known as:  PROTONIX        Take 40 mg by mouth every day.   Dose:  40 mg                          STOP taking these medications     polyethylene glycol 3350 Powd   Commonly known as:  MIRALAX                    Where to Get Your Medications      Information about where to get these medications is not yet available     ! Ask your nurse or doctor about these medications    - ondansetron 4 MG Tbdp  - promethazine 12.5 MG Supp  - promethazine 12.5 MG  tablet            Instructions           Diet / Nutrition:    Follow any diet instructions given to you by your doctor or the dietician, including how much salt (sodium) you are allowed each day.    If you are overweight, talk to your doctor about a weight reduction plan.    Activity:    Remain physically active following your doctor's instructions about exercise and activity.    Rest often.     Any time you become even a little tired or short of breath, SIT DOWN and rest.    Worsening Symptoms:    Report any of the following signs and symptoms to the doctor's office immediately:    *Pain of jaw, arm, or neck  *Chest pain not relieved by medication                               *Dizziness or loss of consciousness  *Difficulty breathing even when at rest   *More tired than usual                                       *Bleeding drainage or swelling of surgical site  *Swelling of feet, ankles, legs or stomach                 *Fever (>100ºF)  *Pink or blood tinged sputum  *Weight gain (3lbs/day or 5lbs /week)           *Shock from internal defibrillator (if applicable)  *Palpitations or irregular heartbeats                *Cool and/or numb extremities    Stroke Awareness    Common Risk Factors for Stroke include:    Age  Atrial Fibrillation  Carotid Artery Stenosis  Diabetes Mellitus  Excessive alcohol consumption  High blood pressure  Overweight   Physical inactivity  Smoking    Warning signs and symptoms of a stroke include:    *Sudden numbness or weakness of the face, arm or leg (especially on one side of the body).  *Sudden confusion, trouble speaking or understanding.  *Sudden trouble seeing in one or both eyes.  *Sudden trouble walking, dizziness, loss of balance or coordination.Sudden severe headache with no known cause.    It is very important to get treatment quickly when a stroke occurs. If you experience any of the above warning signs, call 911 immediately.                   Disclaimer         Quit Smoking /  Tobacco Use:    I understand the use of any tobacco products increases my chance of suffering from future heart disease or stroke and could cause other illnesses which may shorten my life. Quitting the use of tobacco products is the single most important thing I can do to improve my health. For further information on smoking / tobacco cessation call a Toll Free Quit Line at 1-448.817.6216 (*National Cancer [place]) or 1-433.831.1003 (American Lung Association) or you can access the web based program at www.lungusa.org.    [place] Tobacco Users Help Line:  (603) 629-3979       Toll Free: 1-870.753.3218  Quit Tobacco Program [facility] (161)916-6460    Crisis Hotline:    [place] Crisis Hotline:  3-669-FLFCLXQ or 1-735.189.8887    [place] Crisis Hotline:    1-984.214.3189 or 039-778-2079    Discharge Survey:   Thank you for choosing [facility]. We hope we did everything we could to make your hospital stay a pleasant one. You may be receiving a phone survey and we would appreciate your time and participation in answering the questions. Your input is very valuable to us in our efforts to improve our service to our patients and their families.        My signature on this form indicates that:    1. I have reviewed and understand the above information.  2. My questions regarding this information have been answered to my satisfaction.  3. I have formulated a plan with my discharge nurse to obtain my prescribed medications for home.                  Disclaimer         __________________________________                     __________       ________                       Patient Signature                                                 Date                    Time

## 2017-06-19 VITALS
SYSTOLIC BLOOD PRESSURE: 128 MMHG | WEIGHT: 175 LBS | RESPIRATION RATE: 18 BRPM | DIASTOLIC BLOOD PRESSURE: 76 MMHG | HEIGHT: 67 IN | BODY MASS INDEX: 27.47 KG/M2 | TEMPERATURE: 98.8 F | OXYGEN SATURATION: 94 % | HEART RATE: 90 BPM

## 2017-06-19 LAB
ANION GAP SERPL CALC-SCNC: 6 MMOL/L (ref 0–11.9)
BUN SERPL-MCNC: 17 MG/DL (ref 8–22)
CALCIUM SERPL-MCNC: 8.4 MG/DL (ref 8.5–10.5)
CHLORIDE SERPL-SCNC: 108 MMOL/L (ref 96–112)
CO2 SERPL-SCNC: 27 MMOL/L (ref 20–33)
CREAT SERPL-MCNC: 0.62 MG/DL (ref 0.5–1.4)
GFR SERPL CREATININE-BSD FRML MDRD: >60 ML/MIN/1.73 M 2
GLUCOSE SERPL-MCNC: 114 MG/DL (ref 65–99)
POTASSIUM SERPL-SCNC: 3.5 MMOL/L (ref 3.6–5.5)
SODIUM SERPL-SCNC: 141 MMOL/L (ref 135–145)

## 2017-06-19 PROCEDURE — 700102 HCHG RX REV CODE 250 W/ 637 OVERRIDE(OP): Performed by: HOSPITALIST

## 2017-06-19 PROCEDURE — 80048 BASIC METABOLIC PNL TOTAL CA: CPT

## 2017-06-19 PROCEDURE — 700111 HCHG RX REV CODE 636 W/ 250 OVERRIDE (IP): Performed by: HOSPITALIST

## 2017-06-19 PROCEDURE — 700111 HCHG RX REV CODE 636 W/ 250 OVERRIDE (IP): Performed by: INTERNAL MEDICINE

## 2017-06-19 PROCEDURE — A9270 NON-COVERED ITEM OR SERVICE: HCPCS | Performed by: HOSPITALIST

## 2017-06-19 PROCEDURE — 99217 PR OBSERVATION CARE DISCHARGE: CPT | Performed by: INTERNAL MEDICINE

## 2017-06-19 PROCEDURE — 94660 CPAP INITIATION&MGMT: CPT

## 2017-06-19 PROCEDURE — G0378 HOSPITAL OBSERVATION PER HR: HCPCS

## 2017-06-19 RX ORDER — PROMETHAZINE HYDROCHLORIDE 12.5 MG/1
25 SUPPOSITORY RECTAL EVERY 6 HOURS PRN
Qty: 30 SUPPOSITORY | Refills: 3 | Status: SHIPPED | OUTPATIENT
Start: 2017-06-19 | End: 2017-10-12

## 2017-06-19 RX ORDER — ONDANSETRON 4 MG/1
4 TABLET, ORALLY DISINTEGRATING ORAL EVERY 4 HOURS PRN
Qty: 50 TAB | Refills: 0 | Status: SHIPPED | OUTPATIENT
Start: 2017-06-19

## 2017-06-19 RX ORDER — ONDANSETRON 4 MG/1
4 TABLET, ORALLY DISINTEGRATING ORAL EVERY 4 HOURS PRN
Qty: 50 TAB | Refills: 0 | Status: SHIPPED | OUTPATIENT
Start: 2017-06-19 | End: 2017-06-19

## 2017-06-19 RX ORDER — PROMETHAZINE HYDROCHLORIDE 12.5 MG/1
25 SUPPOSITORY RECTAL EVERY 6 HOURS PRN
Qty: 30 SUPPOSITORY | Refills: 3 | Status: SHIPPED | OUTPATIENT
Start: 2017-06-19 | End: 2017-06-19

## 2017-06-19 RX ORDER — PROMETHAZINE HYDROCHLORIDE 12.5 MG/1
25 TABLET ORAL EVERY 6 HOURS PRN
Qty: 50 TAB | Refills: 0 | Status: SHIPPED | OUTPATIENT
Start: 2017-06-19 | End: 2017-10-12

## 2017-06-19 RX ORDER — PROMETHAZINE HYDROCHLORIDE 12.5 MG/1
25 TABLET ORAL EVERY 6 HOURS PRN
Qty: 50 TAB | Refills: 0 | Status: SHIPPED | OUTPATIENT
Start: 2017-06-19 | End: 2017-06-19

## 2017-06-19 RX ADMIN — OMEPRAZOLE 20 MG: 20 CAPSULE, DELAYED RELEASE ORAL at 08:59

## 2017-06-19 RX ADMIN — ENALAPRIL MALEATE 20 MG: 10 TABLET ORAL at 08:59

## 2017-06-19 RX ADMIN — MORPHINE SULFATE 15 MG: 15 TABLET, EXTENDED RELEASE ORAL at 08:59

## 2017-06-19 RX ADMIN — METFORMIN HYDROCHLORIDE 500 MG: 500 TABLET, FILM COATED ORAL at 08:59

## 2017-06-19 RX ADMIN — HEPARIN 500 UNITS: 100 SYRINGE at 13:02

## 2017-06-19 RX ADMIN — ASPIRIN 81 MG: 81 TABLET, CHEWABLE ORAL at 08:59

## 2017-06-19 RX ADMIN — HEPARIN SODIUM 5000 UNITS: 5000 INJECTION, SOLUTION INTRAVENOUS; SUBCUTANEOUS at 07:38

## 2017-06-19 RX ADMIN — STANDARDIZED SENNA CONCENTRATE AND DOCUSATE SODIUM 2 TABLET: 8.6; 5 TABLET, FILM COATED ORAL at 08:59

## 2017-06-19 RX ADMIN — CARVEDILOL 25 MG: 25 TABLET, FILM COATED ORAL at 07:38

## 2017-06-19 ASSESSMENT — COPD QUESTIONNAIRES
DO YOU EVER COUGH UP ANY MUCUS OR PHLEGM?: NO/ONLY WITH OCCASIONAL COLDS OR INFECTIONS
DURING THE PAST 4 WEEKS HOW MUCH DID YOU FEEL SHORT OF BREATH: NONE/LITTLE OF THE TIME
HAVE YOU SMOKED AT LEAST 100 CIGARETTES IN YOUR ENTIRE LIFE: YES
COPD SCREENING SCORE: 4

## 2017-06-19 ASSESSMENT — PAIN SCALES - GENERAL
PAINLEVEL_OUTOF10: 0
PAINLEVEL_OUTOF10: 2

## 2017-06-19 ASSESSMENT — LIFESTYLE VARIABLES: EVER_SMOKED: YES

## 2017-06-19 NOTE — H&P
DATE OF ADMISSION:  06/18/2017.    PRIMARY CARE PHYSICIAN:  HEATHER Del Rio.    ONCOLOGIST:  Dr. Sue.    CHIEF COMPLAINT:  Nausea, vomiting, and diarrhea.    HISTORY OF PRESENT ILLNESS:  Patient is a very pleasant 61-year-old female who   unfortunately has an extensive past medical history of multiple different   cancers including breast cancer, rectal cancer and cervical cancer.  Patient   is currently receiving a trial drug.  She was started on this, this past   Tuesday.  Starting Wednesday morning, she began having nausea, vomiting, and   this persisted since that time.  She has also had diarrhea.  She is unable to   hold anything down.  Denies any fevers or chills.  She takes her medications   nightly.  She states that her symptoms have progressively worsened and she was   also complaining of weakness and she felt very dehydrated and that is why she   decided to come to the emergency department today.    REVIEW OF SYSTEMS:  As per HPI.  All other systems have been reviewed and are   negative.    PAST MEDICAL HISTORY:  1.  Breast cancer.  2.  Cervical cancer.  3.  Rectal cancer.  4.  Hypertension.  5.  Hyperlipidemia.  6.  Gastroesophageal reflux disease.  7.  Obstructive sleep apnea.  8.  Diabetes.  9.  Chronic pain and narcotic dependence.    PAST SURGICAL HISTORY:  1.  Hysterectomy.  2.  Left knee arthroscopy.  3.  Left-sided mastectomy.  4.  Sigmoidoscopy.    SOCIAL HISTORY:  Ten pack years of tobacco use.  She quit approximately 8   years ago.  Denies alcohol or drug use.    FAMILY HISTORY:  Has been reviewed and is not pertinent to her current   presentation, her sister has coronary artery disease.    ALLERGIES:  No known drug allergies.    HOME MEDICATIONS:  1.  MiraLax as needed.  2.  Oxycodone every 8 hours as needed.  3.  MS Contin 15 mg t.i.d.  4.  Fluticasone.  5.  Vasotec 20 mg b.i.d.  6.  Carvedilol 25 mg b.i.d.  7.  Metformin 500 mg daily.  8.  Protonix 40 mg daily.  9.  Lipitor 20 mg  daily.  10.  Calcium carbonate plus cholecalciferol.  11.  Aspirin.  12.  Chemotherapy drugs.    PHYSICAL EXAMINATION:  VITAL SIGNS:  Blood pressure 140/71, a pulse of 91, respirations 29,   temperature 37.1, oxygen saturations 99% on room air, weight 79 kilograms.  GENERAL:  Patient appears chronically ill, currently is in mild distress   secondary to nausea.  HEENT:  Dry mucous membranes.  No oropharyngeal exudates.  Eyes, EOMI, PERRLA.  NECK:  No lymphadenopathy, no JVD.  CARDIOVASCULAR:  Regular rate and rhythm.  There are no murmurs.  LUNGS:  Clear to auscultation bilaterally.  No rales or rhonchi.  She has a   right-sided port in place.  ABDOMEN:  Positive bowel sounds, soft, nontender, nondistended.  EXTREMITIES:  No clubbing, cyanosis, or edema.  NEUROLOGICAL:  She is awake, alert, and oriented to person, place, time, and   situation.    IMAGING:  Chest x-ray shows no acute cardiopulmonary process.    LABORATORY DATA:  WBC 6.7, hemoglobin 12.5, hematocrit 39, platelets 367.    Sodium 140, potassium 3.3, BUN 22, creatinine 0.78.  Lactic acid is 2.9.    ASSESSMENT AND PLAN:  Patient is a very pleasant 61-year-old female who has   multiple different malignancies presents to the hospital with nausea,   vomiting, and diarrhea.  1.  Dehydration.  This is secondary to poor oral intake as well as nausea,   vomiting, and diarrhea.  All of these symptoms are likely the result of her   chemotherapy medication.  At this point, we are going to treat her with   intravenous fluids and I started her on a clear diet.  She states she is not   due to take her chemo drugs tonight so we are going to hold that and then   tomorrow, we will need to discuss this with oncology.  2.  Cancers.  She has had breast cancer, cervical cancer and rectal cancer.    We will need to consult oncology in the morning.  3.  Hypokalemia, mild.  We will replenish this and intravenous fluids.  4.  Lactic acidosis.  This is consistent with dehydration.   We will treat her   with intravenous fluids and recheck labs later.  There is no evidence of   systemic inflammatory response syndrome or sepsis or any infectious etiology.  5.  Chronic pain and narcotic dependence.  We will continue with MS Contin 15   mg t.i.d. as well as oxycodone as needed.  Also, continue with bowel protocol.  6.  Hypertension.  Continue carvedilol and Vasotec.  7.  Hyperlipidemia.  Continue Lipitor 20 mg at bedtime.  8.  Deep venous thrombosis prophylaxis, heparin 5000 units t.i.d.  9.  She is a full code.       ____________________________________     MD DANIELLE Morales / KAILASH    DD:  06/18/2017 19:20:15  DT:  06/18/2017 19:54:53    D#:  [id number]  Job#:  [id number]

## 2017-06-19 NOTE — DIETARY
Nutrition Services: POOR PO AND WT LOSS CONSULT - Pt is a 60 yo female with dx of dehydration, hypokalemia, lactic acidosis, breast cancer, rectal cancer. PMHx includes breast, rectal and cervical cancers, HTN, hyperlipidemia, GERD, sleep apnea, diabetes (pt on metformin), chronic pain and narcotic dependence.    Upon interview, pt states that she has been having n/v with poor PO intake for about 1 week. Pt reports she was able to keep down her breakfast this morning. Pt states she has had wt loss for almost 1 year.  Pt reports a UBW of 213 lb. Pt currently weighs 175 lb (79.3 kg), (18% wt loss, significant wt loss). Discussed adding Boost Plus once diet advances, pt agreed to try this to improve protein intake.       PLAN/RECOMMEND - Encourage PO intake. Recommend Boost Plus BID between meals once diet advances. Nutrition Rep to see patient daily for meal preferences. Please document PO intake as percentage of meals consumed. RD to monitor for diet advancement and PO intake.

## 2017-06-19 NOTE — CARE PLAN
Problem: Safety  Goal: Will remain free from injury  Outcome: PROGRESSING AS EXPECTED  Bed is in low and locked position, bed alarm in place, call light within reach and pt calling appropriately for assistance, hourly rounding in place.    Problem: Knowledge Deficit  Goal: Knowledge of disease process/condition, treatment plan, diagnostic tests, and medications will improve  Outcome: PROGRESSING AS EXPECTED  RN discussed POC with pt, all questions and concerns addressed at this time.

## 2017-06-19 NOTE — PROGRESS NOTES
Assumed care of pt at 0700, pt AAOx4 with lethargy this AM. Calling appropriately for assistance. Call light within reach, bed in low and locked position, hourly rounding in place.

## 2017-06-19 NOTE — DISCHARGE INSTRUCTIONS
Discharge Instructions    Discharged to home by car with relative. Discharged via wheelchair, hospital escort: Yes.  Special equipment needed: Not Applicable    Be sure to schedule a follow-up appointment with your primary care doctor or any specialists as instructed.     Discharge Plan:   Diet Plan: Discussed  Activity Level: Discussed  Confirmed Follow up Appointment: Patient to Call and Schedule Appointment  Confirmed Symptoms Management: Discussed  Medication Reconciliation Updated: Yes  Influenza Vaccine Indication: Not indicated: Previously immunized this influenza season and > 8 years of age    I understand that a diet low in cholesterol, fat, and sodium is recommended for good health. Unless I have been given specific instructions below for another diet, I accept this instruction as my diet prescription.   Other diet: Return to regular diet when no longer nauseas    Special Instructions:   See Dr. Sue in next 1-2 weeks.  Heart healthy diet.  Activity as able.    · Is patient discharged on Warfarin / Coumadin?   No     · Is patient Post Blood Transfusion?  No    Depression / Suicide Risk    As you are discharged from this [facility] facility, it is important to learn how to keep safe from harming yourself.    Recognize the warning signs:  · Abrupt changes in personality, positive or negative- including increase in energy   · Giving away possessions  · Change in eating patterns- significant weight changes-  positive or negative  · Change in sleeping patterns- unable to sleep or sleeping all the time   · Unwillingness or inability to communicate  · Depression  · Unusual sadness, discouragement and loneliness  · Talk of wanting to die  · Neglect of personal appearance   · Rebelliousness- reckless behavior  · Withdrawal from people/activities they love  · Confusion- inability to concentrate     If you or a loved one observes any of these behaviors or has concerns about self-harm, here's what you can  do:  · Talk about it- your feelings and reasons for harming yourself  · Remove any means that you might use to hurt yourself (examples: pills, rope, extension cords, firearm)  · Get professional help from the community (Mental Health, Substance Abuse, psychological counseling)  · Do not be alone:Call your Safe Contact- someone whom you trust who will be there for you.  · Call your local CRISIS HOTLINE 487-1616 or 827-754-5295  · Call your local Children's Mobile Crisis Response Team [place] (215) 181-1002 or wwwSravnikupi  · Call the toll free National Suicide Prevention Hotlines   · National Suicide Prevention Lifeline 813-250-CEYZ (1851)  · National Hope Line Network 800-SUICIDE (405-6682)    Dehydration, Adult  Dehydration means your body does not have as much fluid as it needs. Your kidneys, brain, and heart will not work properly without the right amount of fluids and salt.   HOME CARE  · Ask your doctor how to replace body fluid losses (rehydrate).  · Drink enough fluids to keep your pee (urine) clear or pale yellow.  · Drink small amounts of fluids often if you feel sick to your stomach (nauseous) or throw up (vomit).  · Eat like you normally do.  · Avoid:  ¨ Foods or drinks high in sugar.  ¨ Bubbly (carbonated) drinks.  ¨ Juice.  ¨ Very hot or cold fluids.  ¨ Drinks with caffeine.  ¨ Fatty, greasy foods.  ¨ Alcohol.  ¨ Tobacco.  ¨ Eating too much.  ¨ Gelatin desserts.  · Wash your hands to avoid spreading germs (bacteria, viruses).  · Only take medicine as told by your doctor.  · Keep all doctor visits as told.  GET HELP RIGHT AWAY IF:   · You cannot drink something without throwing up.  · You get worse even with treatment.  · Your vomit has blood in it or looks greenish.  · Your poop (stool) has blood in it or looks black and tarry.  · You have not peed in 6 to 8 hours.  · You pee a small amount of very dark pee.  · You have a fever.  · You pass out (faint).  · You have belly (abdominal) pain  that gets worse or stays in one spot (localizes).  · You have a rash, stiff neck, or bad headache.  · You get easily annoyed, sleepy, or are hard to wake up.  · You feel weak, dizzy, or very thirsty.  MAKE SURE YOU:   · Understand these instructions.  · Will watch your condition.  · Will get help right away if you are not doing well or get worse.     This information is not intended to replace advice given to you by your health care provider. Make sure you discuss any questions you have with your health care provider.     Document Released: 10/14/2010 Document Revised: 03/11/2013 Document Reviewed: 08/06/2012  [employer] Interactive Patient Education ©2016 [employer]

## 2017-06-19 NOTE — ED NOTES
Pt BIB [facility] from home for nausea and vomiting, since Wed. On Wed pt was started on a new medication, [id number], pt unable to keep anything down. Pt changed into gown, placed on monitor, given blankets, and call light. Chart up for ERP. Assessment as charted.

## 2017-06-19 NOTE — DISCHARGE PLANNING
Medical SW    Referral: Sw spoke to RN regarding pt.    Intervention: Pt admitted w/ weakness, nausea and vomiting. Medical staff waiting for oncology to consult then pt will probably d/c home w/o any social needs as she is on OBS.    Plan: Sw to assist w/ d/c planning as needed.

## 2017-06-19 NOTE — ED PROVIDER NOTES
ED Provider Note    CHIEF COMPLAINT  Chief Complaint   Patient presents with   • N/V       HPI  Marilyn Strange is a 61 y.o. female who presents to the emergency department complaining of vomiting and diarrhea after starting on a new chemotherapy drug called AZ D 1775 the patient says that on the 13th of this month she was started on this new medication apparently for metastatic rectal cancer with known metastases to the liver and she also has a history of cervical cancer and breast cancer. The patient says the very next day she started having vomiting and diarrhea and now she is not able to tolerate any oral intake at all so she is coming to the emergency department for further care.    REVIEW OF SYSTEMS no fever no hemoptysis no black or bloody stool or emesis. She has chronic abdominal pain which is unchanged. All other systems negative    PAST MEDICAL HISTORY  Past Medical History   Diagnosis Date   • Rectal cancer (CMS-HCC)    • Hypertension    • Cervical cancer (CMS-HCC)    • Breast cancer (CMS-HCC)      L side   • Arthritis      knees   • Diabetes (CMS-HCC)      oral medication   • Heart burn    • Bowel habit changes      constipation   • Asthma    • Breath shortness      chronic, denies changes   • Snoring    • Sleep apnea      CPAP   • Cancer (CMS-HCC) 1991; 1996,5/2016      left breast;cervical;  rectal-completed radiation/chemo 8/2016       FAMILY HISTORY  No family history on file.    SOCIAL HISTORY  Social History     Social History   • Marital Status: Single     Spouse Name: N/A   • Number of Children: N/A   • Years of Education: N/A     Social History Main Topics   • Smoking status: Former Smoker -- 0.50 packs/day for 20 years     Quit date: 01/01/2009   • Smokeless tobacco: Former User     Quit date: 08/21/2007   • Alcohol Use: No   • Drug Use: No   • Sexual Activity: Not Asked     Other Topics Concern   • None     Social History Narrative       SURGICAL HISTORY  Past Surgical History   Procedure  "Laterality Date   • Hysterectomy, total abdominal  1996     partial hysterectomy   • Knee arthroscopy Left 2004   • Mastectomy Left 1991   • Mammoplasty augmentation  1991   • Bunionectomy Left 2007   • Angiogram  2014   • Breast biopsy Right 2015   • Sigmoidoscopy-flexible N/A 2/22/2017     Procedure: SIGMOIDOSCOPY-FLEXIBLE;  Surgeon: Arun Dias M.D.;  Location: SURGERY [facility];  Service:    • Sigmoidoscopy w/endoscopic us N/A 2/22/2017     Procedure: SIGMOIDOSCOPY W/ENDOSCOPIC US RADIAL AND POSSIBLE LINEAR;  Surgeon: Arun Dias M.D.;  Location: SURGERY [facility];  Service:    • Gastroscopy  5/2/2017     Procedure: GASTROSCOPY;  Surgeon: Navin Quintero M.D.;  Location: SURGERY [facility];  Service:        CURRENT MEDICATIONS  Home Medications     Reviewed by Berna Tripathi (Pharmacy Tech) on 06/18/17 at 1918  Med List Status: Complete    Medication Last Dose Status    aspirin (ASA) 81 MG Chew Tab chewable tablet >3 days Active    atorvastatin (LIPITOR) 20 MG Tab >3 days Active    Calcium Carb-Cholecalciferol (OYSTER SHELL CALCIUM/VITAMIN D) 250-125 MG-UNIT Tab tablet >3 days Active    carvedilol (COREG) 25 MG Tab >3 days Active    enalapril (VASOTEC) 20 MG tablet >3 days Active    Fluticasone Furoate-Vilanterol (BREO ELLIPTA) 100-25 MCG/INH AEROSOL POWDER, BREATH ACTIVATED >3 days Active    metformin (GLUCOPHAGE) 500 MG Tab >3 days Active    morphine ER (MS CONTIN) 15 MG Tab CR tablet  Active    NON SPECIFIED > 3 days Active    oxycodone immediate-release (ROXICODONE) 5 MG Tab 4/28/2017 Active    pantoprazole (PROTONIX) 40 MG Tablet Delayed Response >3 days Active    polyethylene glycol 3350 (MIRALAX) Powder  Active                ALLERGIES  No Known Allergies    PHYSICAL EXAM  VITAL SIGNS: /71 mmHg  Pulse 95  Temp(Src) 37.1 °C (98.8 °F)  Resp 32  Ht 1.702 m (5' 7\")  Wt 79.379 kg (175 lb)  BMI 27.40 kg/m2  SpO2 98%   Oxygen saturation is interpreted as " adequate  Constitutional: Awake and verbal and ill appearing  HENT: Mucous membranes heart  Eyes: No erythema or discharge or jaundice  Neck: Trachea midline no JVD  Cardiovascular: Regular rate and rhythm  Lungs: Clear and equal bilaterally with no apparent difficulty breathing  Abdomen/Back: Soft and diffusely mildly tender which apparently is her baseline  Skin: Warm and dry  Musculoskeletal: No leg edema or calf tenderness  Neurologic: Awake and verbal and moving all extremities    Laboratory  A CBC shows a white blood count of 6.7 with hemoglobin of 12.5, asymmetric metabolic panel is generally unremarkable except the potassium is slightly low at 3.3 and the blood sugar is slightly elevated at 141. Alk phos is slightly elevated at 156, lipase is normal at 32 lactic acid level is elevated at 2.9, INR is 1.11    EKG interpretation  A 12-lead EKG showed a sinus rhythm 89 bpm there is a left axis deviation S wave is seen in lead 1 and RR prime in V2 suggesting right bundle branch block pattern is no pathologic ST elevation or depression findings are similar to cardiogram from June 5, 2017    Radiology  DX-CHEST-PORTABLE (1 VIEW)   Final Result      No acute cardiopulmonary disease.        MEDICAL DECISION MAKING and DISPOSITION  In the emergency department an IV was established the patient was given intravenous fluids and Zofran. These measures were helpful. I reviewed all the findings with the patient and her family and because of her elevated lactic acid level and clinical dehydration she is going to require hospitalization for further evaluation and treatment. I reviewed the case with the hospitalist and the patient is admitted to the hospitalist service    IMPRESSION  1. Vomiting and diarrhea  2. Adverse drug reaction  3. Dehydration      Electronically signed by: Brandon Gilliam, 6/18/2017 8:22 PM

## 2017-06-19 NOTE — ED NOTES
Med rec updated and complete  Allergies reviewed.  Pt is currently on a investigational  Drug for liver cancer.  Pharmacist informed.

## 2017-06-19 NOTE — PROGRESS NOTES
Pt discharged with all paperwork and follow up information. RN discussed dc plan and prescriptions with pt and family. All questions and concerns addressed. Pt port deaccessed. Pt left via wheelchair with hospital transport and family.

## 2017-06-19 NOTE — PROGRESS NOTES
Report received from LOLA Horn. The patient has been transported via gurney with all personal belongings. The patient has been oriented to the floor. A full assessment has been performed. The patient is AOx4 with complaints of 2/10 abdominal pain and nausea. The patient has been medicated with good results, see MAR for details. The patient has been provided with an emesis bag and bedside suction. The patient has been updated on the POC for the shift and all questions have been answered at this time. Family is present at bedside. Bed alarm is in place, call light is within reach. Hourly rounding is in place.

## 2017-06-19 NOTE — PROGRESS NOTES
The patient has refused to put her CPAP back on after toileting. The patient has been seen by the respiratory therapist. RT aware that the patient is currently refusing the CPAP. Education has been provided regarding the importance of wearing the CPAP machine during sleep, but patient continues to refuse.  is in place, hourly rounding in place, and the patient's room is close to the nursing station.

## 2017-06-19 NOTE — ED NOTES
Right chest wall port accessed per protocol, +blood return, flushes without issue, secured, R foot PIV discontinued, pt tolerated procedure with minimal discomfort, no distress noted.

## 2017-06-19 NOTE — PROGRESS NOTES
Admitting physician, Dr. Cheatham, paged regarding continued lactic acid monitoring. The patient's last lactic acid was 2.9. Dr. Cheatham states that continued lactic acid monitoring is not required. No new orders received.

## 2017-06-19 NOTE — ED NOTES
Report to LOLA Adams as charted, pt and family informed of room assignment and transfer process, verbalized understanding. Waiting on transport to the floor.

## 2017-06-19 NOTE — ED NOTES
Pt c/o nausea, active episode of emesis, pt medicated per MAR. Transport arrived to take pt to the floor, all belongings sent with pt.

## 2017-06-19 NOTE — CARE PLAN
Problem: Communication  Goal: The ability to communicate needs accurately and effectively will improve  Intervention: Educate patient and significant other/support system about the plan of care, procedures, treatments, medications and allow for questions  The patient has been oriented to the floor, updated on the POC and educated regarding the current treatment plan. All questions have been answered at this time.       Problem: Safety  Goal: Will remain free from injury  Intervention: Provide assistance with mobility  The patient has been assisted x 1 with mobilization. The patient has been instructed to use the call light for assistance. Call light within reach. Bed alarm activated.

## 2017-06-20 ENCOUNTER — PATIENT OUTREACH (OUTPATIENT)
Dept: HEALTH INFORMATION MANAGEMENT | Facility: OTHER | Age: 62
End: 2017-06-20

## 2017-06-20 NOTE — DISCHARGE SUMMARY
DATE OF ADMISSION:  06/18/2017.    DATE OF DISCHARGE:  06/19/2017.    FINAL DIAGNOSES:  1.  Dehydration, improved and stable for discharge.  2.  Intractable nausea, vomiting and diarrhea, significantly improved and   stable to maintain hydration.  3.  Complex and extensive past medical history of breast cancer, rectal cancer   and cervical cancer, followed by Dr. Alfa Sue with follow up appointment   to be scheduled in the next 1-2 weeks.  4.  Chronic pain.  5.  Hypertension.  6.  Dyslipidemia.  7.  Acid reflux.  8.  Diabetes mellitus.  9.  Mild hypokalemia, resolved.    HISTORY OF PRESENT ILLNESS:  The patient is a 61-year-old female with an   extensive past medical history of breast, cervical and rectal cancer.  Patient   has been receiving an experimental drug on a clinical trial.  She started   this on Tuesday.  Beginning Wednesday morning, she had nausea, vomiting and   diarrhea.  She became dehydrated and presented for definitive care.    HOSPITAL COURSE:  She is now appropriately hydrated.  The nausea, vomiting and   diarrhea has essentially resolved.  I have discussed keeping her for   additional hydration versus discharge to home.  She feels safe to go home at   this time.  Dr. Alfa Sue is here on rounding.  He will discuss whether or   not she should continue the chemotherapeutic experimental medication prior to   her discharge.    DISCHARGE CONDITION:  Guarded, but stable with close followup.    DISCHARGE DIET:  Heart healthy.    DISCHARGE ACTIVITY:  As able.    DISCHARGE MEDICATIONS:  1.  MS Contin 3 times daily.  2.  Oxycodone 5 mg every 8 hours as needed.  3.  Aspirin 81 mg daily.  4.  Fluticasone/vilanterol 100/25 mcg one puff daily.  5.  Metformin 500 mg daily.  6.  Zofran 4 mg every 4 hours as needed for nausea.  7.  Phenergan 25 mg every 6 hours as needed via suppository as needed for   nausea.  8.  Phenergan tablets 25 mg every 6 hours as needed for nausea.  9.  Lipitor 20 mg nightly.  10.   Enalapril 20 mg twice daily.  11.  Coreg 25 mg twice daily.  12.  MiraLax is to be discontinued.  13.  Calcium with vitamin D supplement.  14.  Protonix 40 mg daily.    LABORATORY STUDIES:  On presentation; sodium 140, potassium 3.3, BUN 22 and   creatinine 0.8.  Liver function tests are within normal limits.  Potassium on   discharge measures 3.5; otherwise, electrolytes are within normal limits.    Hemoglobin measures 13; otherwise, blood counts are within normal limits.  INR   is 1.1.    RADIOGRAPHIC STUDIES:  Chest x-ray is negative.    CONSULTS:  Dr. Leila Waite, ___.    PLAN:  The patient will be discharged to home today.  I will have Dr. Leila Waite see her and address whether or not to continue the experimental   medication.    FOLLOWUP:  She will follow up with him in the next one week.    TOTAL TIME SPENT:  Discharge time today is 35 minutes.       ____________________________________     MD DEB DOMINGUEZ / KAILASH    DD:  06/19/2017 11:37:24  DT:  06/19/2017 18:11:43    D#:  [id number]  Job#:  [id number]    cc: LEILA WAITE MD

## 2017-06-23 ENCOUNTER — APPOINTMENT (OUTPATIENT)
Dept: ONCOLOGY | Facility: MEDICAL CENTER | Age: 62
End: 2017-06-23
Attending: INTERNAL MEDICINE
Payer: MEDICAID

## 2017-07-01 LAB — EKG IMPRESSION: NORMAL

## 2017-07-11 ENCOUNTER — HOSPITAL ENCOUNTER (OUTPATIENT)
Dept: RADIOLOGY | Facility: MEDICAL CENTER | Age: 62
End: 2017-07-11
Attending: INTERNAL MEDICINE
Payer: MEDICAID

## 2017-07-11 DIAGNOSIS — C21.0 MALIGNANT NEOPLASM OF ANUS (HCC): ICD-10-CM

## 2017-07-11 PROCEDURE — 700117 HCHG RX CONTRAST REV CODE 255: Performed by: INTERNAL MEDICINE

## 2017-07-11 PROCEDURE — 71260 CT THORAX DX C+: CPT

## 2017-07-11 RX ADMIN — IOHEXOL 100 ML: 350 INJECTION, SOLUTION INTRAVENOUS at 12:15

## 2017-07-20 ENCOUNTER — APPOINTMENT (OUTPATIENT)
Dept: ONCOLOGY | Facility: MEDICAL CENTER | Age: 62
End: 2017-07-20
Attending: INTERNAL MEDICINE
Payer: MEDICAID

## 2017-09-05 ENCOUNTER — HOSPITAL ENCOUNTER (OUTPATIENT)
Dept: RADIATION ONCOLOGY | Facility: MEDICAL CENTER | Age: 62
End: 2017-09-30
Attending: RADIOLOGY
Payer: MEDICAID

## 2017-09-05 VITALS
WEIGHT: 176 LBS | DIASTOLIC BLOOD PRESSURE: 69 MMHG | BODY MASS INDEX: 27.62 KG/M2 | SYSTOLIC BLOOD PRESSURE: 144 MMHG | OXYGEN SATURATION: 98 % | TEMPERATURE: 98.2 F | HEIGHT: 67 IN | HEART RATE: 100 BPM

## 2017-09-05 PROCEDURE — 99212 OFFICE O/P EST SF 10 MIN: CPT | Performed by: RADIOLOGY

## 2017-09-05 PROCEDURE — 99214 OFFICE O/P EST MOD 30 MIN: CPT | Performed by: RADIOLOGY

## 2017-09-05 ASSESSMENT — PAIN SCALES - GENERAL: PAINLEVEL: 4=SLIGHT-MODERATE PAIN

## 2017-09-05 NOTE — PROGRESS NOTES
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    INTERVAL HISTORY: I had the pleasure of seeing Ms. Strange today in follow up for her anal cancer. Unfortunately since we last saw each other she was diagnosed with metastatic disease. This presented with liver and peritoneal spread. She was enrolled on a clinical trial identifying targeted therapies. Unfortunately a target was identified for a new oral agent targeting the wee1 pathway. This pathway is similar to CDK1. She was started on AZD 1775 oral agent. She initially started at the 300 mg strength had significant GI toxicity she is doing much better on 200 mg strength. Initially this required hospitalization for dehydration and abdominal symptoms she has not had a hospitalization now in several months. Her most recent CT scans from July showed stable findings.      PHYSICAL EXAM:    Vitals:    09/05/17 1008   BP: 144/69   Pulse: 100   Temp: 36.8 °C (98.2 °F)   SpO2: 98%   Weight: 79.8 kg (176 lb)   Height: 1.702 m (5' 7\")     Pain Score: 4=Slight-Moderate Pain  Location: Abdomen      1= Restricted in physically strenuous activity, but ambulatory and able to carry out work of a light sedentary nature, e.g., light housework, office work.        GENERAL: No apparent distress.  HEENT:  Pupils are equal, round, and reactive to light.  Extraocular muscles   are intact. Sclerae nonicteric.  Conjunctivae pink.  Oral cavity, tongue   protrudes midline.   NECK:  Supple without evidence of thyromegaly.  NODES:  No peripheral adenopathy of the neck, supraclavicular fossa or " axillae   bilaterally.  LUNGS:  Clear to ascultation bilaterally   HEART:  Regular rate and rhythm.  No murmur appreciated  ABDOMEN:  Soft. No evidence of hepatosplenomegaly.  Positive bowel sounds.  EXTREMITIES:  Without Edema.  NEUROLOGIC:  Cranial nerves II through XII were intact. 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  .    RECOMMENDATIONS:   I discussed the patient her findings over a 35 minute time period, 95% of that time dedicated to an ongoing survivorship plan. While she continues on the clinical trial for metastatic disease with Dr. Sue I will release her from active follow-up. She should however feel free to contact me or be seen at any time in the radiation oncology department.    If patient has any questions or concerns, she should feel free to contact me.

## 2017-09-05 NOTE — NON-PROVIDER
"Patient was seen today in clinic with Dr. Magdaleno for Follow Up.  Vitals signs and weight were obtained and pain assessment was completed.  Allergies and medications were reviewed with the patient.  Review of systems completed.     Vitals/Pain:  Vitals:    09/05/17 1008   BP: 144/69   Pulse: 100   Temp: 36.8 °C (98.2 °F)   SpO2: 98%   Weight: 79.8 kg (176 lb)   Height: 1.702 m (5' 7\")     Pain Score: 4=Slight-Moderate Pain  Location: Abdomen      Allergies:   Review of patient's allergies indicates no known allergies.    Current Medications:  Current Outpatient Prescriptions   Medication Sig Dispense Refill   • ondansetron (ZOFRAN ODT) 4 MG TABLET DISPERSIBLE Take 1 Tab by mouth every four hours as needed for Nausea/Vomiting (give PO if no IV route available). 50 Tab 0   • NON SPECIFIED Take 2 Caps by mouth every bedtime. Investigational medication [id number]     • morphine ER (MS CONTIN) 15 MG Tab CR tablet Take 1 Tab by mouth 3 times a day. 90 Tab 0   • Fluticasone Furoate-Vilanterol (BREO ELLIPTA) 100-25 MCG/INH AEROSOL POWDER, BREATH ACTIVATED Inhale 1 Puff by mouth every day.     • oxycodone immediate-release (ROXICODONE) 5 MG Tab Take 1 Tab by mouth every 8 hours as needed for Severe Pain. 30 Tab 0   • enalapril (VASOTEC) 20 MG tablet Take 20 mg by mouth 2 times a day.     • carvedilol (COREG) 25 MG Tab Take 25 mg by mouth 2 times a day.     • metformin (GLUCOPHAGE) 500 MG Tab Take 500 mg by mouth every day.     • pantoprazole (PROTONIX) 40 MG Tablet Delayed Response Take 40 mg by mouth every day.     • atorvastatin (LIPITOR) 20 MG Tab Take 20 mg by mouth every evening.     • Calcium Carb-Cholecalciferol (OYSTER SHELL CALCIUM/VITAMIN D) 250-125 MG-UNIT Tab tablet Take 1 Tab by mouth 2 times a day.     • aspirin (ASA) 81 MG Chew Tab chewable tablet Take 81 mg by mouth every day.     • promethazine (PHENERGAN) 12.5 MG Suppos Insert 2 Suppositories in rectum every 6 hours as needed for Nausea/Vomiting (if no relief " from ondansetron or if ondansetron is not ordered or if unable to tolerate PO). 30 Suppository 3   • promethazine (PHENERGAN) 12.5 MG tablet Take 2 Tabs by mouth every 6 hours as needed for Nausea/Vomiting (if no relief from ondansetron or if ondansetron is not ordered). 50 Tab 0     No current facility-administered medications for this encounter.        PCP:  Real Stacy, Med Ass't  9/5/2017  10:16 AM

## 2017-09-18 ENCOUNTER — HOSPITAL ENCOUNTER (OUTPATIENT)
Dept: RADIOLOGY | Facility: MEDICAL CENTER | Age: 62
End: 2017-09-18
Attending: INTERNAL MEDICINE
Payer: MEDICAID

## 2017-09-18 DIAGNOSIS — C21.0 MALIGNANT NEOPLASM OF ANUS (HCC): ICD-10-CM

## 2017-09-18 PROCEDURE — 700111 HCHG RX REV CODE 636 W/ 250 OVERRIDE (IP)

## 2017-09-18 PROCEDURE — 71260 CT THORAX DX C+: CPT

## 2017-09-18 PROCEDURE — 700117 HCHG RX CONTRAST REV CODE 255: Performed by: INTERNAL MEDICINE

## 2017-09-18 RX ADMIN — IOHEXOL 50 ML: 240 INJECTION, SOLUTION INTRATHECAL; INTRAVASCULAR; INTRAVENOUS; ORAL at 13:45

## 2017-09-18 RX ADMIN — IOHEXOL 100 ML: 350 INJECTION, SOLUTION INTRAVENOUS at 13:45

## 2017-10-05 NOTE — ADDENDUM NOTE
Encounter addended by: Kristen Motta R.N. on: 10/5/2017 12:53 PM<BR>    Actions taken: Flowsheet accepted

## 2017-10-12 ENCOUNTER — HOSPITAL ENCOUNTER (INPATIENT)
Facility: MEDICAL CENTER | Age: 62
LOS: 23 days | DRG: 854 | End: 2017-11-04
Attending: EMERGENCY MEDICINE | Admitting: INTERNAL MEDICINE
Payer: MEDICAID

## 2017-10-12 ENCOUNTER — RESOLUTE PROFESSIONAL BILLING HOSPITAL PROF FEE (OUTPATIENT)
Dept: HOSPITALIST | Facility: MEDICAL CENTER | Age: 62
End: 2017-10-12
Payer: MEDICAID

## 2017-10-12 ENCOUNTER — APPOINTMENT (OUTPATIENT)
Dept: RADIOLOGY | Facility: MEDICAL CENTER | Age: 62
DRG: 854 | End: 2017-10-12
Attending: INTERNAL MEDICINE
Payer: MEDICAID

## 2017-10-12 ENCOUNTER — APPOINTMENT (OUTPATIENT)
Dept: RADIOLOGY | Facility: MEDICAL CENTER | Age: 62
DRG: 854 | End: 2017-10-12
Attending: EMERGENCY MEDICINE
Payer: MEDICAID

## 2017-10-12 DIAGNOSIS — K56.609 SBO (SMALL BOWEL OBSTRUCTION) (HCC): ICD-10-CM

## 2017-10-12 DIAGNOSIS — C76.2 ABDOMINAL CARCINOMATOSIS (HCC): ICD-10-CM

## 2017-10-12 DIAGNOSIS — C21.0 ANAL CARCINOMA (HCC): ICD-10-CM

## 2017-10-12 DIAGNOSIS — E86.0 DEHYDRATION: ICD-10-CM

## 2017-10-12 DIAGNOSIS — N30.00 ACUTE CYSTITIS WITHOUT HEMATURIA: ICD-10-CM

## 2017-10-12 PROBLEM — D72.829 LEUKOCYTOSIS: Status: ACTIVE | Noted: 2017-10-12

## 2017-10-12 PROBLEM — E87.1 HYPONATREMIA: Status: ACTIVE | Noted: 2017-10-12

## 2017-10-12 PROBLEM — A41.9 SEPSIS (HCC): Status: ACTIVE | Noted: 2017-10-12

## 2017-10-12 PROBLEM — D64.9 NORMOCYTIC ANEMIA: Status: ACTIVE | Noted: 2017-10-12

## 2017-10-12 LAB
ALBUMIN SERPL BCP-MCNC: 2.7 G/DL (ref 3.2–4.9)
ALBUMIN/GLOB SERPL: 0.8 G/DL
ALP SERPL-CCNC: 97 U/L (ref 30–99)
ALT SERPL-CCNC: 11 U/L (ref 2–50)
ANION GAP SERPL CALC-SCNC: 14 MMOL/L (ref 0–11.9)
APPEARANCE UR: ABNORMAL
AST SERPL-CCNC: 24 U/L (ref 12–45)
BACTERIA #/AREA URNS HPF: ABNORMAL /HPF
BASOPHILS # BLD AUTO: 0.1 % (ref 0–1.8)
BASOPHILS # BLD: 0.02 K/UL (ref 0–0.12)
BILIRUB SERPL-MCNC: 1.5 MG/DL (ref 0.1–1.5)
BILIRUB UR QL STRIP.AUTO: ABNORMAL
BUN SERPL-MCNC: 9 MG/DL (ref 8–22)
CALCIUM SERPL-MCNC: 8.5 MG/DL (ref 8.5–10.5)
CAOX CRY #/AREA URNS HPF: ABNORMAL /HPF
CHLORIDE SERPL-SCNC: 98 MMOL/L (ref 96–112)
CO2 SERPL-SCNC: 22 MMOL/L (ref 20–33)
COLOR UR: ABNORMAL
CREAT SERPL-MCNC: 0.67 MG/DL (ref 0.5–1.4)
CULTURE IF INDICATED INDCX: YES UA CULTURE
EKG IMPRESSION: NORMAL
EOSINOPHIL # BLD AUTO: 0 K/UL (ref 0–0.51)
EOSINOPHIL NFR BLD: 0 % (ref 0–6.9)
EPI CELLS #/AREA URNS HPF: ABNORMAL /HPF
ERYTHROCYTE [DISTWIDTH] IN BLOOD BY AUTOMATED COUNT: 51.7 FL (ref 35.9–50)
GFR SERPL CREATININE-BSD FRML MDRD: >60 ML/MIN/1.73 M 2
GLOBULIN SER CALC-MCNC: 3.2 G/DL (ref 1.9–3.5)
GLUCOSE BLD-MCNC: 126 MG/DL (ref 65–99)
GLUCOSE SERPL-MCNC: 123 MG/DL (ref 65–99)
GLUCOSE UR STRIP.AUTO-MCNC: NEGATIVE MG/DL
HCT VFR BLD AUTO: 35.6 % (ref 37–47)
HGB BLD-MCNC: 11.4 G/DL (ref 12–16)
HYALINE CASTS #/AREA URNS LPF: ABNORMAL /LPF
IMM GRANULOCYTES # BLD AUTO: 0.08 K/UL (ref 0–0.11)
IMM GRANULOCYTES NFR BLD AUTO: 0.5 % (ref 0–0.9)
KETONES UR STRIP.AUTO-MCNC: 100 MG/DL
LACTATE BLD-SCNC: 1.1 MMOL/L (ref 0.5–2)
LACTATE BLD-SCNC: 1.5 MMOL/L (ref 0.5–2)
LEUKOCYTE ESTERASE UR QL STRIP.AUTO: ABNORMAL
LIPASE SERPL-CCNC: 14 U/L (ref 11–82)
LYMPHOCYTES # BLD AUTO: 0.46 K/UL (ref 1–4.8)
LYMPHOCYTES NFR BLD: 2.7 % (ref 22–41)
MCH RBC QN AUTO: 29.5 PG (ref 27–33)
MCHC RBC AUTO-ENTMCNC: 32 G/DL (ref 33.6–35)
MCV RBC AUTO: 92 FL (ref 81.4–97.8)
MICRO URNS: ABNORMAL
MONOCYTES # BLD AUTO: 1.09 K/UL (ref 0–0.85)
MONOCYTES NFR BLD AUTO: 6.3 % (ref 0–13.4)
MUCOUS THREADS #/AREA URNS HPF: ABNORMAL /HPF
NEUTROPHILS # BLD AUTO: 15.6 K/UL (ref 2–7.15)
NEUTROPHILS NFR BLD: 90.4 % (ref 44–72)
NITRITE UR QL STRIP.AUTO: NEGATIVE
NRBC # BLD AUTO: 0 K/UL
NRBC BLD AUTO-RTO: 0 /100 WBC
PH UR STRIP.AUTO: 6 [PH]
PLATELET # BLD AUTO: 418 K/UL (ref 164–446)
PMV BLD AUTO: 9.4 FL (ref 9–12.9)
POTASSIUM SERPL-SCNC: 3.6 MMOL/L (ref 3.6–5.5)
PROT SERPL-MCNC: 5.9 G/DL (ref 6–8.2)
PROT UR QL STRIP: >=300 MG/DL
RBC # BLD AUTO: 3.87 M/UL (ref 4.2–5.4)
RBC # URNS HPF: ABNORMAL /HPF
RBC UR QL AUTO: ABNORMAL
SODIUM SERPL-SCNC: 134 MMOL/L (ref 135–145)
SP GR UR STRIP.AUTO: 1.03
UROBILINOGEN UR STRIP.AUTO-MCNC: 8 MG/DL
WBC # BLD AUTO: 17.3 K/UL (ref 4.8–10.8)
WBC #/AREA URNS HPF: ABNORMAL /HPF

## 2017-10-12 PROCEDURE — 96361 HYDRATE IV INFUSION ADD-ON: CPT

## 2017-10-12 PROCEDURE — 770006 HCHG ROOM/CARE - MED/SURG/GYN SEMI*

## 2017-10-12 PROCEDURE — 80053 COMPREHEN METABOLIC PANEL: CPT

## 2017-10-12 PROCEDURE — 94760 N-INVAS EAR/PLS OXIMETRY 1: CPT

## 2017-10-12 PROCEDURE — 80048 BASIC METABOLIC PNL TOTAL CA: CPT

## 2017-10-12 PROCEDURE — 700101 HCHG RX REV CODE 250: Performed by: INTERNAL MEDICINE

## 2017-10-12 PROCEDURE — 700105 HCHG RX REV CODE 258: Performed by: EMERGENCY MEDICINE

## 2017-10-12 PROCEDURE — 87040 BLOOD CULTURE FOR BACTERIA: CPT | Mod: 91

## 2017-10-12 PROCEDURE — 96375 TX/PRO/DX INJ NEW DRUG ADDON: CPT

## 2017-10-12 PROCEDURE — 99223 1ST HOSP IP/OBS HIGH 75: CPT | Performed by: INTERNAL MEDICINE

## 2017-10-12 PROCEDURE — A9270 NON-COVERED ITEM OR SERVICE: HCPCS | Performed by: INTERNAL MEDICINE

## 2017-10-12 PROCEDURE — 700111 HCHG RX REV CODE 636 W/ 250 OVERRIDE (IP): Performed by: INTERNAL MEDICINE

## 2017-10-12 PROCEDURE — 36415 COLL VENOUS BLD VENIPUNCTURE: CPT

## 2017-10-12 PROCEDURE — 96376 TX/PRO/DX INJ SAME DRUG ADON: CPT

## 2017-10-12 PROCEDURE — 83605 ASSAY OF LACTIC ACID: CPT

## 2017-10-12 PROCEDURE — 74177 CT ABD & PELVIS W/CONTRAST: CPT

## 2017-10-12 PROCEDURE — 81001 URINALYSIS AUTO W/SCOPE: CPT

## 2017-10-12 PROCEDURE — 85025 COMPLETE CBC W/AUTO DIFF WBC: CPT

## 2017-10-12 PROCEDURE — 83690 ASSAY OF LIPASE: CPT

## 2017-10-12 PROCEDURE — 93005 ELECTROCARDIOGRAM TRACING: CPT | Performed by: EMERGENCY MEDICINE

## 2017-10-12 PROCEDURE — 770020 HCHG ROOM/CARE - TELE (206)

## 2017-10-12 PROCEDURE — 700117 HCHG RX CONTRAST REV CODE 255: Performed by: EMERGENCY MEDICINE

## 2017-10-12 PROCEDURE — 700102 HCHG RX REV CODE 250 W/ 637 OVERRIDE(OP): Performed by: INTERNAL MEDICINE

## 2017-10-12 PROCEDURE — 99285 EMERGENCY DEPT VISIT HI MDM: CPT

## 2017-10-12 PROCEDURE — 700111 HCHG RX REV CODE 636 W/ 250 OVERRIDE (IP): Performed by: EMERGENCY MEDICINE

## 2017-10-12 PROCEDURE — 700105 HCHG RX REV CODE 258: Performed by: INTERNAL MEDICINE

## 2017-10-12 PROCEDURE — 87086 URINE CULTURE/COLONY COUNT: CPT

## 2017-10-12 PROCEDURE — 96365 THER/PROPH/DIAG IV INF INIT: CPT

## 2017-10-12 PROCEDURE — 82962 GLUCOSE BLOOD TEST: CPT

## 2017-10-12 RX ORDER — SODIUM CHLORIDE 9 MG/ML
1000 INJECTION, SOLUTION INTRAVENOUS ONCE
Status: COMPLETED | OUTPATIENT
Start: 2017-10-12 | End: 2017-10-12

## 2017-10-12 RX ORDER — AMOXICILLIN 250 MG
2 CAPSULE ORAL 2 TIMES DAILY
Status: DISCONTINUED | OUTPATIENT
Start: 2017-10-12 | End: 2017-10-30

## 2017-10-12 RX ORDER — SODIUM CHLORIDE 9 MG/ML
INJECTION, SOLUTION INTRAVENOUS CONTINUOUS
Status: DISCONTINUED | OUTPATIENT
Start: 2017-10-12 | End: 2017-10-16

## 2017-10-12 RX ORDER — PROMETHAZINE HYDROCHLORIDE 25 MG/1
12.5-25 TABLET ORAL EVERY 4 HOURS PRN
Status: DISCONTINUED | OUTPATIENT
Start: 2017-10-12 | End: 2017-11-04 | Stop reason: HOSPADM

## 2017-10-12 RX ORDER — OXYCODONE HYDROCHLORIDE 5 MG/1
2.5 TABLET ORAL
Status: DISCONTINUED | OUTPATIENT
Start: 2017-10-12 | End: 2017-10-14

## 2017-10-12 RX ORDER — SODIUM CHLORIDE 9 MG/ML
500 INJECTION, SOLUTION INTRAVENOUS
Status: COMPLETED | OUTPATIENT
Start: 2017-10-12 | End: 2017-10-12

## 2017-10-12 RX ORDER — SODIUM CHLORIDE 9 MG/ML
30 INJECTION, SOLUTION INTRAVENOUS
Status: DISCONTINUED | OUTPATIENT
Start: 2017-10-12 | End: 2017-11-04 | Stop reason: HOSPADM

## 2017-10-12 RX ORDER — DEXTROSE MONOHYDRATE 25 G/50ML
25 INJECTION, SOLUTION INTRAVENOUS
Status: DISCONTINUED | OUTPATIENT
Start: 2017-10-12 | End: 2017-11-04 | Stop reason: HOSPADM

## 2017-10-12 RX ORDER — ACETAMINOPHEN 325 MG/1
650 TABLET ORAL EVERY 6 HOURS PRN
Status: DISCONTINUED | OUTPATIENT
Start: 2017-10-12 | End: 2017-11-04 | Stop reason: HOSPADM

## 2017-10-12 RX ORDER — LABETALOL HYDROCHLORIDE 5 MG/ML
10 INJECTION, SOLUTION INTRAVENOUS EVERY 4 HOURS PRN
Status: DISCONTINUED | OUTPATIENT
Start: 2017-10-12 | End: 2017-11-04 | Stop reason: HOSPADM

## 2017-10-12 RX ORDER — MORPHINE SULFATE 4 MG/ML
2 INJECTION, SOLUTION INTRAMUSCULAR; INTRAVENOUS
Status: DISCONTINUED | OUTPATIENT
Start: 2017-10-12 | End: 2017-10-12

## 2017-10-12 RX ORDER — CEFTRIAXONE 1 G/1
1 INJECTION, POWDER, FOR SOLUTION INTRAMUSCULAR; INTRAVENOUS ONCE
Status: COMPLETED | OUTPATIENT
Start: 2017-10-12 | End: 2017-10-12

## 2017-10-12 RX ORDER — POLYETHYLENE GLYCOL 3350 17 G/17G
1 POWDER, FOR SOLUTION ORAL
Status: DISCONTINUED | OUTPATIENT
Start: 2017-10-12 | End: 2017-10-30

## 2017-10-12 RX ORDER — ONDANSETRON 4 MG/1
4 TABLET, ORALLY DISINTEGRATING ORAL EVERY 4 HOURS PRN
Status: DISCONTINUED | OUTPATIENT
Start: 2017-10-12 | End: 2017-11-04 | Stop reason: HOSPADM

## 2017-10-12 RX ORDER — OXYCODONE HYDROCHLORIDE 5 MG/1
5 TABLET ORAL
Status: DISCONTINUED | OUTPATIENT
Start: 2017-10-12 | End: 2017-10-14

## 2017-10-12 RX ORDER — PROMETHAZINE HYDROCHLORIDE 25 MG/1
12.5-25 SUPPOSITORY RECTAL EVERY 4 HOURS PRN
Status: DISCONTINUED | OUTPATIENT
Start: 2017-10-12 | End: 2017-11-04 | Stop reason: HOSPADM

## 2017-10-12 RX ORDER — BISACODYL 10 MG
10 SUPPOSITORY, RECTAL RECTAL
Status: DISCONTINUED | OUTPATIENT
Start: 2017-10-12 | End: 2017-10-30

## 2017-10-12 RX ORDER — ONDANSETRON 2 MG/ML
4 INJECTION INTRAMUSCULAR; INTRAVENOUS EVERY 4 HOURS PRN
Status: DISCONTINUED | OUTPATIENT
Start: 2017-10-12 | End: 2017-11-04 | Stop reason: HOSPADM

## 2017-10-12 RX ORDER — ONDANSETRON 2 MG/ML
4 INJECTION INTRAMUSCULAR; INTRAVENOUS ONCE
Status: COMPLETED | OUTPATIENT
Start: 2017-10-12 | End: 2017-10-12

## 2017-10-12 RX ADMIN — HYDROMORPHONE HYDROCHLORIDE 1 MG: 1 INJECTION, SOLUTION INTRAMUSCULAR; INTRAVENOUS; SUBCUTANEOUS at 13:58

## 2017-10-12 RX ADMIN — ONDANSETRON 4 MG: 2 INJECTION INTRAMUSCULAR; INTRAVENOUS at 17:31

## 2017-10-12 RX ADMIN — HYDROMORPHONE HYDROCHLORIDE 1 MG: 1 INJECTION, SOLUTION INTRAMUSCULAR; INTRAVENOUS; SUBCUTANEOUS at 18:40

## 2017-10-12 RX ADMIN — SODIUM CHLORIDE 1000 ML: 9 INJECTION, SOLUTION INTRAVENOUS at 10:01

## 2017-10-12 RX ADMIN — STANDARDIZED SENNA CONCENTRATE AND DOCUSATE SODIUM 2 TABLET: 8.6; 5 TABLET, FILM COATED ORAL at 20:25

## 2017-10-12 RX ADMIN — ENOXAPARIN SODIUM 40 MG: 100 INJECTION SUBCUTANEOUS at 16:17

## 2017-10-12 RX ADMIN — HYDROMORPHONE HYDROCHLORIDE 1 MG: 1 INJECTION, SOLUTION INTRAMUSCULAR; INTRAVENOUS; SUBCUTANEOUS at 10:02

## 2017-10-12 RX ADMIN — IOHEXOL 100 ML: 350 INJECTION, SOLUTION INTRAVENOUS at 11:14

## 2017-10-12 RX ADMIN — SODIUM CHLORIDE 1000 ML: 9 INJECTION, SOLUTION INTRAVENOUS at 11:53

## 2017-10-12 RX ADMIN — SODIUM CHLORIDE: 9 INJECTION, SOLUTION INTRAVENOUS at 22:11

## 2017-10-12 RX ADMIN — SODIUM CHLORIDE: 9 INJECTION, SOLUTION INTRAVENOUS at 14:39

## 2017-10-12 RX ADMIN — HYDROMORPHONE HYDROCHLORIDE 1 MG: 1 INJECTION, SOLUTION INTRAMUSCULAR; INTRAVENOUS; SUBCUTANEOUS at 22:02

## 2017-10-12 RX ADMIN — HYDROMORPHONE HYDROCHLORIDE 1 MG: 1 INJECTION, SOLUTION INTRAMUSCULAR; INTRAVENOUS; SUBCUTANEOUS at 11:30

## 2017-10-12 RX ADMIN — CEFTRIAXONE 1 G: 1 INJECTION, SOLUTION INTRAVENOUS at 18:12

## 2017-10-12 RX ADMIN — MORPHINE SULFATE 2 MG: 4 INJECTION INTRAVENOUS at 16:17

## 2017-10-12 RX ADMIN — CEFTRIAXONE SODIUM 1 G: 1 INJECTION, POWDER, FOR SOLUTION INTRAMUSCULAR; INTRAVENOUS at 12:25

## 2017-10-12 RX ADMIN — LABETALOL HYDROCHLORIDE 10 MG: 5 INJECTION, SOLUTION INTRAVENOUS at 19:14

## 2017-10-12 RX ADMIN — ONDANSETRON 4 MG: 2 INJECTION INTRAMUSCULAR; INTRAVENOUS at 10:02

## 2017-10-12 ASSESSMENT — PAIN SCALES - GENERAL
PAINLEVEL_OUTOF10: 9
PAINLEVEL_OUTOF10: 10
PAINLEVEL_OUTOF10: 5
PAINLEVEL_OUTOF10: 9
PAINLEVEL_OUTOF10: 9

## 2017-10-12 ASSESSMENT — ENCOUNTER SYMPTOMS
SHORTNESS OF BREATH: 0
DIAPHORESIS: 0
COUGH: 0
CLAUDICATION: 0
CHILLS: 0
CONSTIPATION: 0
TREMORS: 0
DEPRESSION: 0
MYALGIAS: 0
VOMITING: 1
TINGLING: 0
HEADACHES: 0
STRIDOR: 0
BLURRED VISION: 0
FOCAL WEAKNESS: 0
BACK PAIN: 0
PHOTOPHOBIA: 0
WEAKNESS: 1
DIZZINESS: 0
PND: 0
FEVER: 0
BLOOD IN STOOL: 0
SPEECH CHANGE: 0
NAUSEA: 1
ABDOMINAL PAIN: 1
DIARRHEA: 0
SPUTUM PRODUCTION: 0
HALLUCINATIONS: 0
SORE THROAT: 0
DOUBLE VISION: 0

## 2017-10-12 ASSESSMENT — LIFESTYLE VARIABLES
EVER_SMOKED: YES
ALCOHOL_USE: NO
SUBSTANCE_ABUSE: 0

## 2017-10-12 NOTE — ED NOTES
1156 - 's. Pts HR had remained -115 since being admitted to ER.  ERP to the bedside.  Attempted vagal maneuvers w/o success.  EKG ordered.  Pt reports abd pain more tolerable at this time.

## 2017-10-12 NOTE — PROGRESS NOTES
Received report from ER.  Assumed care of patient at 1615.  AA&O x 4.  Ambulates with SBA.  Medicated for 8/10 pain.  Dr. Doll at bedside - order to place NGT to suction if patient becomes nauseous or vomits.  IVF running at 100 mL/hr.

## 2017-10-12 NOTE — ED NOTES
Power port accessed, sterile technique, + blood return.  Labs drawn & sent.  Pt medicated as ordered. Warm blanket provided.  Call light in reach.  Family at the bedside.  Updated on poc.

## 2017-10-12 NOTE — ED NOTES
Marilyn Cox Strange 62 y.o. female   To triage in  with granddaughter.    Chief Complaint   Patient presents with   • Abdominal Pain     x2 days.  History of breast, cervical and rectal cancer with mets to liver.  Currently on clinical trial, PO chemo.

## 2017-10-12 NOTE — ED PROVIDER NOTES
ED Provider Note    Scribed for Markus Zaragoza M.D. by Marybel Khan. 10/12/2017, 9:30 AM.    Primary care provider: REMY [place]  Means of arrival: walk in   History obtained from: patient   History limited by: none     CHIEF COMPLAINT  Chief Complaint   Patient presents with   • Abdominal Pain     x2 days.  History of breast, cervical and rectal cancer with mets to liver.  Currently on clinical trial, PO chemo.       DARIELA Strange is a 62 y.o. female who presents to the Emergency Department for evaluation of intermittent generalized abdominal pain onset 2-3 days ago with progressively worsening severity since onset. Movement exacerbates her pain. She reports associated vomiting onset two days ago. She denies fever and diarrhea. Patient has a history of cervical cancer with metastasis to her liver for which she is currently taking a trial medication. Her oncologist is Dr. Sue. Patient to grams. The pain is a 10 over 10-type pains, constant in nature. Patient denies any diarrhea or actually a bowel movement in the past day.      REVIEW OF SYSTEMS  Review of Systems   Constitutional: Negative for fever.   Gastrointestinal: Positive for abdominal pain and vomiting. Negative for diarrhea.   All other systems reviewed and are negative.  C.       PAST MEDICAL HISTORY   has a past medical history of Arthritis; Asthma; Bowel habit changes; Breast cancer (CMS-HCC); Breath shortness; Cancer (CMS-HCC) (1991; 1996,5/2016); Cervical cancer (CMS-HCC); Diabetes (CMS-HCC); Heart burn; Hypertension; Rectal cancer (CMS-HCC); Sleep apnea; and Snoring.      SURGICAL HISTORY   has a past surgical history that includes hysterectomy, total abdominal (1996); knee arthroscopy (Left, 2004); mastectomy (Left, 1991); mammoplasty augmentation (1991); bunionectomy (Left, 2007); angiogram (2014); breast biopsy (Right, 2015); sigmoidoscopy-flexible (N/A, 2/22/2017); sigmoidoscopy w/endoscopic us (N/A, 2/22/2017); and  "gastroscopy (5/2/2017).      SOCIAL HISTORY  Social History   Substance Use Topics   • Smoking status: Former Smoker     Packs/day: 0.50     Years: 20.00     Quit date: 1/1/2009   • Smokeless tobacco: Former User     Quit date: 8/21/2007   • Alcohol use No      History   Drug Use No       FAMILY HISTORY  History reviewed. No pertinent family history.      CURRENT MEDICATIONS  Home Medications    **Home medications have not yet been reviewed for this encounter**         ALLERGIES  No Known Allergies          PHYSICAL EXAM  VITAL SIGNS: BP (!) 166/80   Pulse (!) 123   Temp 35.9 °C (96.7 °F)   Resp 18   Ht 1.702 m (5' 7\")   SpO2 99%   Constitutional: Pale in appearance but otherwise Well developed, Well nourished, Appears to be in pain.   HENT: Normocephalic, Atraumatic, Bilateral external ears normal, dry mucous membranes, No oral exudates, Nose normal.   Eyes: Pupils are equal round and react to light, extraocular motions are intact, conjunctiva is normal, there are no signs of exudate.   Neck: Supple, no meningeal signs.  Lymphatic: No lymphadenopathy noted.   Cardiovascular: Regular rate and rhythm without murmurs gallops or rubs.   Thorax & Lungs: No respiratory distress. Breathing comfortably. Lungs are clear to auscultation bilaterally, there are no wheezes no rales. Chest wall is nontender.  Abdomen: Soft, diffusely tender, nondistended. Bowel sounds are present.   Skin: Pale appearing. Warm, Dry, No erythema,   Back: No tenderness, No CVA tenderness.  Musculoskeletal: Good range of motion in all major joints. No tenderness to palpation or major deformities noted. Intact distal pulses, no clubbing, no cyanosis, no edema,   Neurologic: Alert & oriented x 3, Moving all extremities. No gross abnormalities.    Psychiatric: Affect normal, Judgment normal, Mood normal.         LABS  Results for orders placed or performed during the hospital encounter of 10/12/17   CBC WITH DIFFERENTIAL   Result Value Ref Range "    WBC 17.3 (H) 4.8 - 10.8 K/uL    RBC 3.87 (L) 4.20 - 5.40 M/uL    Hemoglobin 11.4 (L) 12.0 - 16.0 g/dL    Hematocrit 35.6 (L) 37.0 - 47.0 %    MCV 92.0 81.4 - 97.8 fL    MCH 29.5 27.0 - 33.0 pg    MCHC 32.0 (L) 33.6 - 35.0 g/dL    RDW 51.7 (H) 35.9 - 50.0 fL    Platelet Count 418 164 - 446 K/uL    MPV 9.4 9.0 - 12.9 fL    Neutrophils-Polys 90.40 (H) 44.00 - 72.00 %    Lymphocytes 2.70 (L) 22.00 - 41.00 %    Monocytes 6.30 0.00 - 13.40 %    Eosinophils 0.00 0.00 - 6.90 %    Basophils 0.10 0.00 - 1.80 %    Immature Granulocytes 0.50 0.00 - 0.90 %    Nucleated RBC 0.00 /100 WBC    Neutrophils (Absolute) 15.60 (H) 2.00 - 7.15 K/uL    Lymphs (Absolute) 0.46 (L) 1.00 - 4.80 K/uL    Monos (Absolute) 1.09 (H) 0.00 - 0.85 K/uL    Eos (Absolute) 0.00 0.00 - 0.51 K/uL    Baso (Absolute) 0.02 0.00 - 0.12 K/uL    Immature Granulocytes (abs) 0.08 0.00 - 0.11 K/uL    NRBC (Absolute) 0.00 K/uL   COMP METABOLIC PANEL   Result Value Ref Range    Sodium 134 (L) 135 - 145 mmol/L    Potassium 3.6 3.6 - 5.5 mmol/L    Chloride 98 96 - 112 mmol/L    Co2 22 20 - 33 mmol/L    Anion Gap 14.0 (H) 0.0 - 11.9    Glucose 123 (H) 65 - 99 mg/dL    Bun 9 8 - 22 mg/dL    Creatinine 0.67 0.50 - 1.40 mg/dL    Calcium 8.5 8.5 - 10.5 mg/dL    AST(SGOT) 24 12 - 45 U/L    ALT(SGPT) 11 2 - 50 U/L    Alkaline Phosphatase 97 30 - 99 U/L    Total Bilirubin 1.5 0.1 - 1.5 mg/dL    Albumin 2.7 (L) 3.2 - 4.9 g/dL    Total Protein 5.9 (L) 6.0 - 8.2 g/dL    Globulin 3.2 1.9 - 3.5 g/dL    A-G Ratio 0.8 g/dL   LIPASE   Result Value Ref Range    Lipase 14 11 - 82 U/L   URINALYSIS CULTURE, IF INDICATED   Result Value Ref Range    Micro Urine Req Microscopic     Color Dark Yellow     Character Hazy (A)     Specific Gravity 1.030 <1.035    Ph 6.0 5.0 - 8.0    Glucose Negative Negative mg/dL    Ketones 100 (A) Negative mg/dL    Protein >=300 (A) Negative mg/dL    Bilirubin Moderate (A) Negative    Urobilinogen, Urine 8.0 Negative    Nitrite Negative Negative    Leukocyte  Esterase Large (A) Negative    Occult Blood Large (A) Negative    Culture Indicated Yes UA Culture   LACTIC ACID   Result Value Ref Range    Lactic Acid 1.5 0.5 - 2.0 mmol/L   URINE MICROSCOPIC (W/UA)   Result Value Ref Range    WBC 10-20 (A) /hpf    RBC 20-50 (A) /hpf    Bacteria Few (A) None /hpf    Epithelial Cells Few /hpf    Mucous Threads Moderate /hpf    Ca Oxalate Crystal Few /hpf    Hyaline Cast 0-2 /lpf   ESTIMATED GFR   Result Value Ref Range    GFR If African American >60 >60 mL/min/1.73 m 2    GFR If Non African American >60 >60 mL/min/1.73 m 2   EKG (ER)   Result Value Ref Range    Report       [facility] Emergency Dept.    Test Date:  2017-10-12  Pt Name:    ABDULAZIZ SETHI                    Department: ER  MRN:        [id number]                      Room:       [facility]  Gender:     F                            Technician: [id number]  :        [id number]                   Requested By:THANG ALEXANDRA  Order #:    [id number]                    Reading MD: THANG ALEXANDRA MD    Measurements  Intervals                                Axis  Rate:       108                          P:          49  OK:         168                          QRS:        -53  QRSD:       116                          T:          3  QT:         344  QTc:        461    Interpretive Statements  SINUS TACHYCARDIA  INCOMPLETE RBBB AND LAFB  PROBABLE LEFT VENTRICULAR HYPERTROPHY  Compared to ECG 2017 17:44:31  RIGHT BUNDLE BRANCH BLOCK  and LAFB  No acute changes    Electronically Signed On 10- 13:52:09 PDT by THANG ALEXANDRA MD     All labs reviewed by me.        RADIOLOGY  CT-ABDOMEN-PELVIS WITH   Final Result         1. Dilated proximal and mid small bowel with decompressed distal small bowel, in keeping with small bowel obstruction.      2. New abdominal and pelvic ascites.      3. Redemonstration of ill-defined masses in the liver, poorly visualized in this exam.      4. Diffuse wall thickening of the  urinary bladder, nonspecific and could relate to infection, inflammation.      The radiologist's interpretation of all radiological studies have been reviewed by me.      COURSE & MEDICAL DECISION MAKING  Pertinent Labs & Imaging studies reviewed. (See chart for details)    9:30 AM - Patient seen and examined at bedside. Patient will be treated with IV fluids secondary to history of vomiting, Zofran 4 mg and dilaudid 1 mg. Ordered CT abdomen, CBC, CMP, lipase, urinalysis and lactic acid to evaluate her symptoms.     11:47 AM Reviewed the patient's imaging results which indicated partial small bowel obstruction. Paged general surgery.     11:50 AM Patient reevaluated at bedside. Discussed imaging results with the patient and plan of care. She agrees to admission for further treatment and evaluation.     11:56 AM Patient noted to be tachycardic on the monitor at 140. She was reevaluated at bedside and denies having any significant increase in her pain. She performed the valsalva maneuver with no relief.     12:02 PM Reviewed the patient's monitor which indicated the patient had sinus tachycardia that converted to SVT at 11:55 AM.     12:05 PM Paged the hospitalist and general surgery.     12:06 PM Consult with Dr. Doll, general surgery, who agrees to see the patient.     12:10 PM Informed the patient spontaneously converted at this time.     12:44 PM Consult with Dr. Waterman, hospitalist, who agrees to admit the patient.       Decision Making:  Patient presents for evaluation. IV was established. Patient was given a liter bolus of normal saline for assumed dehydration. Return studies CT scan were noted as above. This point does appear the patient is significantly dehydrated, was given additional liter bolus of normal saline and does appear to be having a urinary tract infection. Also, the patient with a gram of Rocephin. CT scan shows signs of that are consistent with small bowel obstructions, most like an ileus  secondary to the UTI. However, obstruction is a possibility. At this point, she is not actively vomiting so I do not feel an NG tube is necessary. I spoke with Dr. Doll, who will consult on the patient and I have spoken with the hospitalist to admit.      DISPOSITION:  Patient will be admitted to Dr. Waterman in guarded condition.      FINAL IMPRESSION  1. SBO (small bowel obstruction)    2. Dehydration    3. Acute cystitis without hematuria         IMarybel ([place]), am scribing for, and in the presence of, Markus Zaragoza M.D..  Electronically signed by: Marybel Khan (Binh), 10/12/2017  IMarkus M.D. personally performed the services described in this documentation, as scribed by Marybel Khan in my presence, and it is both accurate and complete.    The note accurately reflects work and decisions made by me.  Markus Zaragoza  10/12/2017  1:53 PM

## 2017-10-12 NOTE — CONSULTS
General Surgery Consult    CHIEF COMPLAINT: Abdominal pain nausea and vomiting.     HISTORY OF PRESENT ILLNESS: The patient is a 62 y.o. female, who was presents with abdominal pain nausea and vomiting. The patient is a very unfortunate 62-year-old female with a history of multiple types of cancer to include breast cancer, cervical cancer, and anal cancer. She was recently found to have recurrence of her anal cancer with metastasis to the liver. She has presented off and on over the past several months with abdominal pain nausea and vomiting. On this presentation she underwent a CT scan consistent with cystitis and a possible bowel obstruction.   She does have a UA that is positive and hematuria. General surgery was consult for evaluation and management.    The patient describes diffuse abdominal pain but is without exacerbating or relieving factors. She was admitted to the medicine service this afternoon.   She is being followed by oncology and is undergoing a treatment protocol. No nasogastric tube has been placed    PAST MEDICAL HISTORY:  has a past medical history of Arthritis; Asthma; Bowel habit changes; Breast cancer (CMS-[facility]); Breath shortness; Cancer (CMS-[facility]) (1991; 1996,5/2016); Cervical cancer (CMS-HCC); Diabetes (CMS-HCC); Heart burn; Hypertension; Rectal cancer (CMS-HCC); Sleep apnea; and Snoring.     PAST SURGICAL HISTORY:  has a past surgical history that includes hysterectomy, total abdominal (1996); knee arthroscopy (Left, 2004); mastectomy (Left, 1991); mammoplasty augmentation (1991); bunionectomy (Left, 2007); angiogram (2014); breast biopsy (Right, 2015); sigmoidoscopy-flexible (N/A, 2/22/2017); sigmoidoscopy w/endoscopic us (N/A, 2/22/2017); and gastroscopy (5/2/2017).     ALLERGIES: No Known Allergies     CURRENT MEDICATIONS:   Home Medications     Reviewed by Berna Muse (Pharmacy Tech) on 10/12/17 at 1435  Med List Status: Complete   Medication Last Dose Status   aspirin (ASA) 81 MG  "Chew Tab chewable tablet 10/11/2017 Active   atorvastatin (LIPITOR) 20 MG Tab 10/11/2017 Active   carvedilol (COREG) 25 MG Tab 10/11/2017 Active   enalapril (VASOTEC) 20 MG tablet 10/11/2017 Active   Fluticasone Furoate-Vilanterol (BREO ELLIPTA) 100-25 MCG/INH AEROSOL POWDER, BREATH ACTIVATED 10/11/2017 Active   metformin (GLUCOPHAGE) 500 MG Tab 10/11/2017 Active   morphine ER (MS CONTIN) 15 MG Tab CR tablet 10/12/2017 Active   NON SPECIFIED 10/9/2017 Active   ondansetron (ZOFRAN ODT) 4 MG TABLET DISPERSIBLE 10/11/2017 Active   oxycodone immediate-release (ROXICODONE) 5 MG Tab 10/11/2017 Active   pantoprazole (PROTONIX) 40 MG Tablet Delayed Response 10/11/2017 Active                FAMILY HISTORY: History reviewed. No pertinent family history.     SOCIAL HISTORY:   Social History     Social History Main Topics   • Smoking status: Former Smoker     Packs/day: 0.50     Years: 20.00     Quit date: 1/1/2009   • Smokeless tobacco: Former User     Quit date: 8/21/2007   • Alcohol use No   • Drug use: No   • Sexual activity: Not on file       REVIEW OF SYSTEMS: Comprehensive review of systems was negative aside from the above HPI.     PHYSICAL EXAMINATION:     GENERAL: The patient isIn moderate distress.   VITAL SIGNS: Blood pressure (!) 166/80, pulse (!) 114, temperature 36.7 °C (98.1 °F), resp. rate 20, height 1.702 m (5' 7\"), weight 79.8 kg (176 lb), SpO2 99 %.  HEAD AND NECK: Demonstrates symmetric, reactive pupils. Extraocular muscles   are intact. Nares and oropharynx are clear.   NECK: Supple. No adenopathy.  CHEST:No respiratory distress.    CARDIOVASCULAR: Tachycardic  ABDOMEN: Scarring consistent with surgical history. She is diffusely tender.   EXTREMITIES: Examination of the upper and lower extremities demonstrates no cyanosis edema or clubbing.  NEUROLOGIC: Alert & oriented x 3, Normal motor function, Normal sensory function, No focal deficits noted.    LABORATORY VALUES:   Recent Labs      10/12/17   1000 "   WBC  17.3*   RBC  3.87*   HEMOGLOBIN  11.4*   HEMATOCRIT  35.6*   MCV  92.0   MCH  29.5   MCHC  32.0*   RDW  51.7*   PLATELETCT  418   MPV  9.4     Recent Labs      10/12/17   1000   SODIUM  134*   POTASSIUM  3.6   CHLORIDE  98   CO2  22   GLUCOSE  123*   BUN  9   CREATININE  0.67   CALCIUM  8.5     Recent Labs      10/12/17   1000   ASTSGOT  24   ALTSGPT  11   TBILIRUBIN  1.5   ALKPHOSPHAT  97   GLOBULIN  3.2            IMAGING:   CT-ABDOMEN-PELVIS WITH   Final Result         1. Dilated proximal and mid small bowel with decompressed distal small bowel, in keeping with small bowel obstruction.      2. New abdominal and pelvic ascites.      3. Redemonstration of ill-defined masses in the liver, poorly visualized in this exam.      4. Diffuse wall thickening of the urinary bladder, nonspecific and could relate to infection, inflammation.          IMPRESSION AND PLAN:   1. Partial small bowel obstruction  2. Metastatic anal cancer with possible carcinomatosis based on prior CT  3. Urinary tract infection.    1. The patient is either suffering from an ileus secondary to her urinary tract infection and sepsis versus a bowel obstruction likely from metastatic cancer. In either case I do not believe surgery is a good treatment option at this point. Should she continue to have pain, nausea and vomiting would recommend placement of an NG tube prior to consideration of surgery. Should she require surgery this would possibly be an end-of-life event.     2. Recommend treating the urinary tract infection.    3. As the patient is a cancer patient undergoing a treatment protocol, I would recommend increasing the narcotic doses for comfort for the patient.    4. Recommend palliative care consult for further discussion with the patient regarding her options      ____________________________________   Galindo Doll M.D.    DD: 10/12/2017 DT: 4:43 PM

## 2017-10-12 NOTE — ED NOTES
Pt resting, eyes closed, resp even and unlabored.  Pt has a bed assignment.  Will call report shortly.

## 2017-10-13 ENCOUNTER — APPOINTMENT (OUTPATIENT)
Dept: RADIOLOGY | Facility: MEDICAL CENTER | Age: 62
DRG: 854 | End: 2017-10-13
Attending: SURGERY
Payer: MEDICAID

## 2017-10-13 LAB
ANION GAP SERPL CALC-SCNC: 10 MMOL/L (ref 0–11.9)
ANION GAP SERPL CALC-SCNC: 15 MMOL/L (ref 0–11.9)
BUN SERPL-MCNC: 8 MG/DL (ref 8–22)
BUN SERPL-MCNC: 9 MG/DL (ref 8–22)
CA-I SERPL-SCNC: 1.1 MMOL/L (ref 1.1–1.3)
CALCIUM SERPL-MCNC: 6.7 MG/DL (ref 8.5–10.5)
CALCIUM SERPL-MCNC: 8.2 MG/DL (ref 8.5–10.5)
CHLORIDE SERPL-SCNC: 106 MMOL/L (ref 96–112)
CHLORIDE SERPL-SCNC: 108 MMOL/L (ref 96–112)
CO2 SERPL-SCNC: 16 MMOL/L (ref 20–33)
CO2 SERPL-SCNC: 24 MMOL/L (ref 20–33)
CREAT SERPL-MCNC: 0.49 MG/DL (ref 0.5–1.4)
CREAT SERPL-MCNC: 0.54 MG/DL (ref 0.5–1.4)
ERYTHROCYTE [DISTWIDTH] IN BLOOD BY AUTOMATED COUNT: 52.3 FL (ref 35.9–50)
GFR SERPL CREATININE-BSD FRML MDRD: >60 ML/MIN/1.73 M 2
GFR SERPL CREATININE-BSD FRML MDRD: >60 ML/MIN/1.73 M 2
GLUCOSE BLD-MCNC: 100 MG/DL (ref 65–99)
GLUCOSE BLD-MCNC: 112 MG/DL (ref 65–99)
GLUCOSE BLD-MCNC: 116 MG/DL (ref 65–99)
GLUCOSE BLD-MCNC: 116 MG/DL (ref 65–99)
GLUCOSE SERPL-MCNC: 115 MG/DL (ref 65–99)
GLUCOSE SERPL-MCNC: 117 MG/DL (ref 65–99)
HCT VFR BLD AUTO: 32.8 % (ref 37–47)
HGB BLD-MCNC: 10.3 G/DL (ref 12–16)
MAGNESIUM SERPL-MCNC: 1.6 MG/DL (ref 1.5–2.5)
MCH RBC QN AUTO: 28.9 PG (ref 27–33)
MCHC RBC AUTO-ENTMCNC: 31.4 G/DL (ref 33.6–35)
MCV RBC AUTO: 91.9 FL (ref 81.4–97.8)
PLATELET # BLD AUTO: 451 K/UL (ref 164–446)
PMV BLD AUTO: 9.5 FL (ref 9–12.9)
POTASSIUM SERPL-SCNC: 2.9 MMOL/L (ref 3.6–5.5)
POTASSIUM SERPL-SCNC: 3.4 MMOL/L (ref 3.6–5.5)
RBC # BLD AUTO: 3.57 M/UL (ref 4.2–5.4)
SODIUM SERPL-SCNC: 139 MMOL/L (ref 135–145)
SODIUM SERPL-SCNC: 140 MMOL/L (ref 135–145)
WBC # BLD AUTO: 13.8 K/UL (ref 4.8–10.8)

## 2017-10-13 PROCEDURE — 700111 HCHG RX REV CODE 636 W/ 250 OVERRIDE (IP): Performed by: HOSPITALIST

## 2017-10-13 PROCEDURE — 90471 IMMUNIZATION ADMIN: CPT

## 2017-10-13 PROCEDURE — 74250 X-RAY XM SM INT 1CNTRST STD: CPT

## 2017-10-13 PROCEDURE — 82962 GLUCOSE BLOOD TEST: CPT

## 2017-10-13 PROCEDURE — 700105 HCHG RX REV CODE 258: Performed by: INTERNAL MEDICINE

## 2017-10-13 PROCEDURE — 83735 ASSAY OF MAGNESIUM: CPT

## 2017-10-13 PROCEDURE — 770020 HCHG ROOM/CARE - TELE (206)

## 2017-10-13 PROCEDURE — 700105 HCHG RX REV CODE 258

## 2017-10-13 PROCEDURE — 82330 ASSAY OF CALCIUM: CPT

## 2017-10-13 PROCEDURE — 700111 HCHG RX REV CODE 636 W/ 250 OVERRIDE (IP): Performed by: SURGERY

## 2017-10-13 PROCEDURE — 700105 HCHG RX REV CODE 258: Performed by: HOSPITALIST

## 2017-10-13 PROCEDURE — 85027 COMPLETE CBC AUTOMATED: CPT

## 2017-10-13 PROCEDURE — 700111 HCHG RX REV CODE 636 W/ 250 OVERRIDE (IP): Performed by: INTERNAL MEDICINE

## 2017-10-13 PROCEDURE — 3E0234Z INTRODUCTION OF SERUM, TOXOID AND VACCINE INTO MUSCLE, PERCUTANEOUS APPROACH: ICD-10-PCS | Performed by: INTERNAL MEDICINE

## 2017-10-13 PROCEDURE — 700117 HCHG RX CONTRAST REV CODE 255: Performed by: SURGERY

## 2017-10-13 PROCEDURE — 99232 SBSQ HOSP IP/OBS MODERATE 35: CPT | Performed by: INTERNAL MEDICINE

## 2017-10-13 PROCEDURE — 80048 BASIC METABOLIC PNL TOTAL CA: CPT

## 2017-10-13 PROCEDURE — 90686 IIV4 VACC NO PRSV 0.5 ML IM: CPT | Performed by: INTERNAL MEDICINE

## 2017-10-13 RX ORDER — MAGNESIUM SULFATE HEPTAHYDRATE 40 MG/ML
4 INJECTION, SOLUTION INTRAVENOUS ONCE
Status: COMPLETED | OUTPATIENT
Start: 2017-10-13 | End: 2017-10-13

## 2017-10-13 RX ORDER — POTASSIUM CHLORIDE 20 MEQ/1
40 TABLET, EXTENDED RELEASE ORAL EVERY 4 HOURS
Status: DISCONTINUED | OUTPATIENT
Start: 2017-10-13 | End: 2017-10-13

## 2017-10-13 RX ORDER — SODIUM CHLORIDE 9 MG/ML
INJECTION, SOLUTION INTRAVENOUS
Status: COMPLETED
Start: 2017-10-13 | End: 2017-10-13

## 2017-10-13 RX ORDER — POTASSIUM CHLORIDE 7.45 MG/ML
10 INJECTION INTRAVENOUS
Status: COMPLETED | OUTPATIENT
Start: 2017-10-13 | End: 2017-10-13

## 2017-10-13 RX ADMIN — HYDROMORPHONE HYDROCHLORIDE 1 MG: 1 INJECTION, SOLUTION INTRAMUSCULAR; INTRAVENOUS; SUBCUTANEOUS at 02:03

## 2017-10-13 RX ADMIN — HYDROMORPHONE HYDROCHLORIDE 1 MG: 1 INJECTION, SOLUTION INTRAMUSCULAR; INTRAVENOUS; SUBCUTANEOUS at 22:21

## 2017-10-13 RX ADMIN — ENOXAPARIN SODIUM 40 MG: 100 INJECTION SUBCUTANEOUS at 10:53

## 2017-10-13 RX ADMIN — ONDANSETRON 4 MG: 2 INJECTION INTRAMUSCULAR; INTRAVENOUS at 22:21

## 2017-10-13 RX ADMIN — MAGNESIUM SULFATE IN WATER 4 G: 40 INJECTION, SOLUTION INTRAVENOUS at 11:26

## 2017-10-13 RX ADMIN — SODIUM CHLORIDE 1000 ML: 9 INJECTION, SOLUTION INTRAVENOUS at 07:53

## 2017-10-13 RX ADMIN — SODIUM CHLORIDE: 9 INJECTION, SOLUTION INTRAVENOUS at 06:53

## 2017-10-13 RX ADMIN — POTASSIUM CHLORIDE 10 MEQ: 7.46 INJECTION, SOLUTION INTRAVENOUS at 02:03

## 2017-10-13 RX ADMIN — POTASSIUM CHLORIDE 10 MEQ: 7.46 INJECTION, SOLUTION INTRAVENOUS at 03:22

## 2017-10-13 RX ADMIN — HYDROMORPHONE HYDROCHLORIDE 1 MG: 1 INJECTION, SOLUTION INTRAMUSCULAR; INTRAVENOUS; SUBCUTANEOUS at 10:53

## 2017-10-13 RX ADMIN — HYDROMORPHONE HYDROCHLORIDE 1 MG: 1 INJECTION, SOLUTION INTRAMUSCULAR; INTRAVENOUS; SUBCUTANEOUS at 15:05

## 2017-10-13 RX ADMIN — FAMOTIDINE 20 MG: 10 INJECTION, SOLUTION INTRAVENOUS at 22:20

## 2017-10-13 RX ADMIN — HYDROMORPHONE HYDROCHLORIDE 1 MG: 1 INJECTION, SOLUTION INTRAMUSCULAR; INTRAVENOUS; SUBCUTANEOUS at 06:34

## 2017-10-13 RX ADMIN — POTASSIUM CHLORIDE 10 MEQ: 7.46 INJECTION, SOLUTION INTRAVENOUS at 06:50

## 2017-10-13 RX ADMIN — CALCIUM GLUCONATE 2 G: 94 INJECTION, SOLUTION INTRAVENOUS at 02:03

## 2017-10-13 RX ADMIN — SODIUM CHLORIDE 500 ML: 9 INJECTION, SOLUTION INTRAVENOUS at 02:03

## 2017-10-13 RX ADMIN — INFLUENZA A VIRUS A/MICHIGAN/45/2015 X-275 (H1N1) ANTIGEN (FORMALDEHYDE INACTIVATED), INFLUENZA A VIRUS A/HONG KONG/4801/2014 X-263B (H3N2) ANTIGEN (FORMALDEHYDE INACTIVATED), INFLUENZA B VIRUS B/PHUKET/3073/2013 ANTIGEN (FORMALDEHYDE INACTIVATED), AND INFLUENZA B VIRUS B/BRISBANE/60/2008 ANTIGEN (FORMALDEHYDE INACTIVATED) 0.5 ML: 15; 15; 15; 15 INJECTION, SUSPENSION INTRAMUSCULAR at 06:52

## 2017-10-13 RX ADMIN — FAMOTIDINE 20 MG: 10 INJECTION, SOLUTION INTRAVENOUS at 10:53

## 2017-10-13 RX ADMIN — POTASSIUM CHLORIDE 10 MEQ: 7.46 INJECTION, SOLUTION INTRAVENOUS at 05:42

## 2017-10-13 RX ADMIN — CEFTRIAXONE 2 G: 2 INJECTION, SOLUTION INTRAVENOUS at 10:53

## 2017-10-13 RX ADMIN — HYDROMORPHONE HYDROCHLORIDE 1 MG: 1 INJECTION, SOLUTION INTRAMUSCULAR; INTRAVENOUS; SUBCUTANEOUS at 18:27

## 2017-10-13 RX ADMIN — POTASSIUM CHLORIDE 10 MEQ: 7.46 INJECTION, SOLUTION INTRAVENOUS at 08:00

## 2017-10-13 RX ADMIN — IOHEXOL 200 ML: 300 INJECTION, SOLUTION INTRAVENOUS at 10:16

## 2017-10-13 RX ADMIN — POTASSIUM CHLORIDE 10 MEQ: 7.46 INJECTION, SOLUTION INTRAVENOUS at 04:26

## 2017-10-13 RX ADMIN — SODIUM CHLORIDE: 9 INJECTION, SOLUTION INTRAVENOUS at 15:05

## 2017-10-13 ASSESSMENT — ENCOUNTER SYMPTOMS
FALLS: 0
WEAKNESS: 0
COUGH: 0
MYALGIAS: 0
FEVER: 0
SPUTUM PRODUCTION: 0
LOSS OF CONSCIOUSNESS: 0
NAUSEA: 1
CHILLS: 0
PALPITATIONS: 0
SHORTNESS OF BREATH: 0
HEADACHES: 0
STRIDOR: 0
DIARRHEA: 0
ABDOMINAL PAIN: 1
TINGLING: 0
DEPRESSION: 0
CONSTIPATION: 1
DIZZINESS: 0
VOMITING: 1

## 2017-10-13 ASSESSMENT — PAIN SCALES - GENERAL
PAINLEVEL_OUTOF10: 5
PAINLEVEL_OUTOF10: 10
PAINLEVEL_OUTOF10: 6
PAINLEVEL_OUTOF10: 7
PAINLEVEL_OUTOF10: 8
PAINLEVEL_OUTOF10: 5
PAINLEVEL_OUTOF10: 4
PAINLEVEL_OUTOF10: 10
PAINLEVEL_OUTOF10: 0
PAINLEVEL_OUTOF10: 7
PAINLEVEL_OUTOF10: 3

## 2017-10-13 NOTE — CARE PLAN
Problem: Nutritional:  Goal: Achieve adequate nutritional intake  Patient will start diet and consume >50% of meals  Outcome: NOT MET

## 2017-10-13 NOTE — ASSESSMENT & PLAN NOTE
- poor prognosis, nothing more that can be done, not even surgery.   - Continue PO diet for comfort- earlyt satiety and bloating may be texture related. Fulls recommended, may have eggs  -Continue pain control (PRN oral roxanol, and MS contin). Continue PRN antiemetics. Gastrostomy tube in place for palliation.

## 2017-10-13 NOTE — PROGRESS NOTES
Hospitalist paged again to confirm orders.  contacted- oral potassium changed to IV, remote tele ordered, increase IV fluids from 100 to 150 ml/hr NS, ionized calcium and magnesium STAT ordered.   The doctor also ordered a repeat BMP when the K riders finish.

## 2017-10-13 NOTE — PROGRESS NOTES
[facility] Hospitalist Progress Note    Date of Service: 10/13/2017    Chief Complaint  62 y.o. female admitted 10/12/2017 with abdominal pain.    Interval Problem Update  Patient began having generalized abdominal pain 2 days prior to admission.  Pain continued to worsen so she presented to the emergency department.  Patient also complained of nausea and vomiting as well as constipation.  Patient was noted to have a small bowel obstruction, did have an NG placed to suction.  Patient does have a small bowel follow-through today, pending results.  Patient does have a history of breast cancer, anal and rectal cancer with liver metastasis as well as cervical cancer.  Overnight patient did began having bowel movements, pain is improved.    Discussed plan patient edition with nurse at bedside    Consultants/Specialty  Surgery-Dr. Doll    Disposition  Patient requires additional treatment in the hospital        Review of Systems   Constitutional: Negative for chills, fever and malaise/fatigue.   HENT: Negative for congestion.    Respiratory: Negative for cough, sputum production, shortness of breath and stridor.    Cardiovascular: Negative for chest pain, palpitations and leg swelling.   Gastrointestinal: Positive for abdominal pain, constipation, nausea and vomiting. Negative for diarrhea.   Genitourinary: Negative for dysuria and urgency.   Musculoskeletal: Negative for falls and myalgias.   Neurological: Negative for dizziness, tingling, loss of consciousness, weakness and headaches.   Psychiatric/Behavioral: Negative for depression and suicidal ideas.   All other systems reviewed and are negative.     Physical Exam  Laboratory/Imaging   Hemodynamics  Temp (24hrs), Av.7 °C (98 °F), Min:35.9 °C (96.7 °F), Max:37.1 °C (98.7 °F)   Temperature: 36.2 °C (97.2 °F)  Pulse  Av.1  Min: 102  Max: 142 Heart Rate (Monitored): (!) 114  Blood Pressure: 108/66 (RN notified), NIBP: (!) 161/86      Respiratory      Respiration:  20, Pulse Oximetry: 99 %             Fluids    Intake/Output Summary (Last 24 hours) at 10/13/17 0740  Last data filed at 10/13/17 0400   Gross per 24 hour   Intake             1650 ml   Output              950 ml   Net              700 ml       Nutrition  Orders Placed This Encounter   Procedures   • Diet NPO     Standing Status:   Standing     Number of Occurrences:   1     Order Specific Question:   Restrict to:     Answer:   Strict [1]     Physical Exam   Constitutional: She is oriented to person, place, and time. She appears well-developed. No distress.   HENT:   Head: Normocephalic and atraumatic.   Mouth/Throat: Oropharynx is clear and moist. No oropharyngeal exudate.   Eyes: Right eye exhibits no discharge. Left eye exhibits no discharge.   Neck: Neck supple. No tracheal deviation present.   Cardiovascular: Normal rate and regular rhythm.  Exam reveals no gallop and no friction rub.    Murmur heard.  Pulmonary/Chest: Effort normal and breath sounds normal. No stridor. No respiratory distress. She has no wheezes. She has no rales. She exhibits no tenderness.   Abdominal: Soft. She exhibits no distension. Bowel sounds are decreased. There is tenderness.   Musculoskeletal: Normal range of motion. She exhibits no edema or tenderness.   Lymphadenopathy:     She has no cervical adenopathy.   Neurological: She is alert and oriented to person, place, and time. No cranial nerve deficit.   Skin: Skin is warm and dry. No rash noted. She is not diaphoretic. No erythema.   Psychiatric: She is slowed.   Odd affect    Nursing note and vitals reviewed.      Recent Labs      10/12/17   1000  10/13/17   0200   WBC  17.3*  13.8*   RBC  3.87*  3.57*   HEMOGLOBIN  11.4*  10.3*   HEMATOCRIT  35.6*  32.8*   MCV  92.0  91.9   MCH  29.5  28.9   MCHC  32.0*  31.4*   RDW  51.7*  52.3*   PLATELETCT  418  451*   MPV  9.4  9.5     Recent Labs      10/12/17   1000  10/12/17   2320   SODIUM  134*  139   POTASSIUM  3.6  2.9*   CHLORIDE  98   108   CO2  22  16*   GLUCOSE  123*  117*   BUN  9  9   CREATININE  0.67  0.49*   CALCIUM  8.5  6.7*                      Assessment/Plan     Anal carcinoma (CMS-HCC)- (present on admission)   Assessment & Plan    - Chronic, palliative care to see, outpatient follow-up        Sepsis (CMS-HCC)   Assessment & Plan    - This is sepsis (without associated acute organ dysfunction).   - Due to urinary tract infection, improving        Hyponatremia- (present on admission)   Assessment & Plan    - Resolved        Normocytic anemia- (present on admission)   Assessment & Plan    - Chronic, no sign of gross bleeding        SBO (small bowel obstruction)- (present on admission)   Assessment & Plan    - Patient remains nothing by mouth, does seem to be improving now, decreased output from the NG, patient having small bowel movements  - Bowel sounds do seem improved  - Small bowel follow-through today          Rectal cancer (CMS-HCC)- (present on admission)   Assessment & Plan    - Patient is on experimental drug as an outpatient, continue outpatient follow-up        Hypokalemia- (present on admission)   Assessment & Plan    - Potassium supplementation, repeat BMP in the morning        UTI (urinary tract infection)- (present on admission)   Assessment & Plan    - Continue Rocephin            DVT prophylaxis pharmacological::  Enoxaparin (Lovenox)  Antibiotics:  Treating active infection/contamination beyond 24 hours perioperative coverage

## 2017-10-13 NOTE — ASSESSMENT & PLAN NOTE
- Patient is on experimental drug as an outpatient. Home hospice services being arranged. Dr. Sue agreed that hospice is appropriate level of care.

## 2017-10-13 NOTE — H&P
Hospital Medicine History and Physical    Date of Service  10/12/2017    Chief Complaint  Chief Complaint   Patient presents with   • Abdominal Pain     x2 days.  History of breast, cervical and rectal cancer with mets to liver.  Currently on clinical trial, PO chemo.       History of Presenting Illness  This is a 62 y.o. pleasant, cooperative female who presented 10/12/2017 with past medical history significant for breast cancer, anal and rectal cancer with liver metastasis, cervical cancer. Patient is presently receiving a trial drug. Patient comes in today for evaluation of abdominal pain that is diffuse in nature without any radiation ongoing for past 2 days. Patient states the pain comes and goes. She denies any alleviating symptoms. Patient's pain is Patient also has had 2 episodes of nausea and nonbilious, nonbloody emesis over the last 2 days associated with it. Patient states that she has had a bowel movement this morning.   Primary Care Physician  ANAMARIA Del Rio.    Consultants  Dr. Doll - Surgery    Code Status  FULL     Review of Systems  Review of Systems   Constitutional: Negative for chills, diaphoresis and fever.   HENT: Negative for sore throat.    Eyes: Negative for blurred vision, double vision and photophobia.   Respiratory: Negative for cough, sputum production, shortness of breath and stridor.    Cardiovascular: Negative for chest pain, claudication, leg swelling and PND.   Gastrointestinal: Positive for abdominal pain (diffuse), nausea and vomiting. Negative for blood in stool, constipation, diarrhea and melena.   Genitourinary: Negative for dysuria, frequency, hematuria and urgency.   Musculoskeletal: Negative for back pain, joint pain and myalgias.   Skin: Negative for itching and rash.   Neurological: Positive for weakness. Negative for dizziness, tingling, tremors, speech change, focal weakness and headaches.   Psychiatric/Behavioral: Negative for depression, hallucinations,  substance abuse and suicidal ideas.        Past Medical History  Past Medical History:   Diagnosis Date   • Arthritis     knees   • Asthma    • Bowel habit changes     constipation   • Breast cancer (CMS-HCC)     L side   • Breath shortness     chronic, denies changes   • Cancer (CMS-[facility]) 1991; 1996,5/2016     left breast;cervical;  rectal-completed radiation/chemo 8/2016   • Cervical cancer (CMS-HCC)    • Diabetes (CMS-HCC)     oral medication   • Heart burn    • Hypertension    • Rectal cancer (CMS-HCC)    • Sleep apnea     CPAP   • Snoring        Surgical History  Past Surgical History:   Procedure Laterality Date   • GASTROSCOPY  5/2/2017    Procedure: GASTROSCOPY;  Surgeon: Navin Quintero M.D.;  Location: SURGERY [facility];  Service:    • SIGMOIDOSCOPY-FLEXIBLE N/A 2/22/2017    Procedure: SIGMOIDOSCOPY-FLEXIBLE;  Surgeon: Arun Dias M.D.;  Location: SURGERY [facility];  Service:    • SIGMOIDOSCOPY W/ENDOSCOPIC US N/A 2/22/2017    Procedure: SIGMOIDOSCOPY W/ENDOSCOPIC US RADIAL AND POSSIBLE LINEAR;  Surgeon: Arun Dias M.D.;  Location: SURGERY [facility];  Service:    • BREAST BIOPSY Right 2015   • ANGIOGRAM  2014   • BUNIONECTOMY Left 2007   • KNEE ARTHROSCOPY Left 2004   • HYSTERECTOMY, TOTAL ABDOMINAL  1996    partial hysterectomy   • MASTECTOMY Left 1991   • MAMMOPLASTY AUGMENTATION  1991       Medications  No current facility-administered medications on file prior to encounter.      Current Outpatient Prescriptions on File Prior to Encounter   Medication Sig Dispense Refill   • ondansetron (ZOFRAN ODT) 4 MG TABLET DISPERSIBLE Take 1 Tab by mouth every four hours as needed for Nausea/Vomiting (give PO if no IV route available). 50 Tab 0   • NON SPECIFIED Take 2 Caps by mouth every bedtime. Investigational medication [id number]     • morphine ER (MS CONTIN) 15 MG Tab CR tablet Take 1 Tab by mouth 3 times a day. 90 Tab 0   • Fluticasone Furoate-Vilanterol (BREO ELLIPTA) 100-25 MCG/INH  AEROSOL POWDER, BREATH ACTIVATED Inhale 1 Puff by mouth every day.     • oxycodone immediate-release (ROXICODONE) 5 MG Tab Take 1 Tab by mouth every 8 hours as needed for Severe Pain. 30 Tab 0   • enalapril (VASOTEC) 20 MG tablet Take 20 mg by mouth 2 times a day.     • carvedilol (COREG) 25 MG Tab Take 25 mg by mouth 2 times a day.     • metformin (GLUCOPHAGE) 500 MG Tab Take 500 mg by mouth every day.     • pantoprazole (PROTONIX) 40 MG Tablet Delayed Response Take 40 mg by mouth every day.     • atorvastatin (LIPITOR) 20 MG Tab Take 20 mg by mouth every evening.     • aspirin (ASA) 81 MG Chew Tab chewable tablet Take 81 mg by mouth every day.         Family History  History reviewed. No pertinent family history.    Social History  Social History   Substance Use Topics   • Smoking status: Former Smoker     Packs/day: 0.50     Years: 20.00     Quit date: 2009   • Smokeless tobacco: Former User     Quit date: 2007   • Alcohol use No       Allergies  No Known Allergies     Physical Exam  Laboratory   Hemodynamics  Temp (24hrs), Av.4 °C (97.6 °F), Min:35.9 °C (96.7 °F), Max:36.7 °C (98.1 °F)   Temperature: 36.7 °C (98.1 °F)  Pulse  Av.9  Min: 105  Max: 142 Heart Rate (Monitored): (!) 114  Blood Pressure: (!) 182/98 (RN aware), NIBP: (!) 161/86      Respiratory      Respiration: (!) 28, Pulse Oximetry: 97 %             Physical Exam   Constitutional: She is oriented to person, place, and time. No distress.   HENT:   Head: Normocephalic and atraumatic.   Right Ear: External ear normal.   Left Ear: External ear normal.   Mouth/Throat: Oropharynx is clear and moist. No oropharyngeal exudate.   Eyes: Conjunctivae and EOM are normal. Pupils are equal, round, and reactive to light. Right eye exhibits no discharge. Left eye exhibits no discharge.   Neck: Normal range of motion. Neck supple. No JVD present.   Cardiovascular: Normal rate, regular rhythm and normal heart sounds.    No murmur  heard.  Pulmonary/Chest: Effort normal. No stridor. No respiratory distress. She has no wheezes. She has no rales.   Abdominal: Soft. She exhibits no distension. There is tenderness (diffusely). There is no rebound and no guarding.   Musculoskeletal: She exhibits no edema.   Neurological: She is alert and oriented to person, place, and time. No cranial nerve deficit.   Skin: Skin is warm and dry. She is not diaphoretic.   Psychiatric: She has a normal mood and affect. Her behavior is normal. Thought content normal.       Recent Labs      10/12/17   1000   WBC  17.3*   RBC  3.87*   HEMOGLOBIN  11.4*   HEMATOCRIT  35.6*   MCV  92.0   MCH  29.5   MCHC  32.0*   RDW  51.7*   PLATELETCT  418   MPV  9.4     Recent Labs      10/12/17   1000   SODIUM  134*   POTASSIUM  3.6   CHLORIDE  98   CO2  22   GLUCOSE  123*   BUN  9   CREATININE  0.67   CALCIUM  8.5     Recent Labs      10/12/17   1000   ALTSGPT  11   ASTSGOT  24   ALKPHOSPHAT  97   TBILIRUBIN  1.5   LIPASE  14   GLUCOSE  123*                 No results found for: TROPONINI  Urinalysis:    Lab Results  Component Value Date/Time   SPECGRAVITY 1.030 10/12/2017 1000   GLUCOSEUR Negative 10/12/2017 1000   KETONES 100 (A) 10/12/2017 1000   NITRITE Negative 10/12/2017 1000   WBCURINE 10-20 (A) 10/12/2017 1000   RBCURINE 20-50 (A) 10/12/2017 1000   BACTERIA Few (A) 10/12/2017 1000   EPITHELCELL Few 10/12/2017 1000        Imaging  CT Abdomen and Pelvis with contrast    1. Dilated proximal and mid small bowel with decompressed distal small bowel, in keeping with small bowel obstruction.    2. New abdominal and pelvic ascites.    3. Redemonstration of ill-defined masses in the liver, poorly visualized in this exam.    4. Diffuse wall thickening of the urinary bladder, nonspecific and could relate to infection, inflammation.   Assessment/Plan     I anticipate this patient will require at least two midnights for appropriate medical management, necessitating inpatient  admission.    Anal carcinoma (CMS-[facility])- (present on admission)   Assessment & Plan    Refer to above.        Sepsis (CMS-[facility])   Assessment & Plan    This is sepsis (without associated acute organ dysfunction).   Secondary to urinary tract infection. Cultures ordered. Continue patient on ceftriaxone.        Hyponatremia- (present on admission)   Assessment & Plan    Likely secondary to dehydration, patient started on IV fluids, BMP tomorrow morning.        Normocytic anemia- (present on admission)   Assessment & Plan    Likely secondary to anemia of chronic disease. Near baseline, continue to monitor.        Leukocytosis- (present on admission)   Assessment & Plan    Secondary to urinary tract infection        SBO (small bowel obstruction)- (present on admission)   Assessment & Plan    Patient will be kept nothing by mouth, started on IV fluids. NG tube ordered. Dr. Doll from surgery consulting, recommends conservative management at this time.  CT abdomen shows dilated proximal and mid small bowel likely small bowel obstruction.          Rectal cancer (CMS-[facility])- (present on admission)   Assessment & Plan    Patient noted to have rectal cancer with liver metastasis. Patient is presently on experimental drug for her rectal cancer. Patient follows with Dr. Sue as an outpatient. Palliative care consulted for advanced care planning.        UTI (urinary tract infection)- (present on admission)   Assessment & Plan    Patient started on ceftriaxone, urine culture ordered.            VTE prophylaxis: Lovenox.       This dictation was created using voice recognition software. The accuracy of the dictation is limited to the abilities of the software. I expect there may be some errors of grammar and possibly content.

## 2017-10-13 NOTE — PROGRESS NOTES
Patient still in 10/10 pain after 2 mg morphine - contacted Dr. Waterman for more pain control - received order for Dilaudid 1 mg Q 3 hours PRN.

## 2017-10-13 NOTE — CONSULTS
"Reason for PC Consult: Advance Care Planning    Consulted by: Guevara    Assessment:  General: 62 year old female admitted 10/12/17 for small bowel obstruction. Also has UTI with sepsis normocytic anemia, leukocytosis, hyponatremia, . PMH significant for rectal cancer diagnosed in 2016 with liver mets being treated by Dr. Sue with clinical trail oral medication, breast cancer in 1991, uterine/cervical cancer in 1996, HTN, and DM2. Patient awake, alert, and oriented x 4.     Dyspnea: 98% on 1 LPM via nasal cannula. SOB OE.  Last BM: 10/13/17.    Pain: Abdominal pain 10/10 s/p small bowel series; patient reports she received pain medication and it is now 8/10 \"but subsiding.\"   Depression: No.      Spiritual:  Is Episcopalian or spirituality important for coping with this illness? Yes; Faith. Open to spiritual care visit. Message left for  Jono at 3232.    Has a  or spiritual provider visit been requested? Yes    Palliative Performance Scale: 50%    Advance Directive: None; reviewed and left copy that patient started. She indicated #2, 3, and 5 for directives and state \"I do not want to live like a vegetable. If treatments will not help me but only prolong my life I do not want them.\"  DPOA: She is unsure if she wants her daughter Nicole Tenorio [phone] who lives locally (1 of 5) or her sister Madeleine Foote (1 of 3) who lives locally to act as her DPOA-HC. She would like to think about it and let us know.    POLST: None. Reviewed and left copy.      Code Status: Full.      Outcome:  Introduced myself along with level III nursing student Elena. Discussed role of palliative care. Discussed goals of care. Patient his hopeful to have current medical issues treated so she can continue with clinical trial of oral medication for her rectal cancer. She stated, \"I have a lot to live for. 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 She also has personal experience with family on life support in the intensive care unit. She emphasized that she would want to try but \"wouldn't want to go on if it wasn't doing me any good.\" Patient would like to consider DPOA-HC prior to executing an AD. Provided palliative care business card and brochure and encouraged her to call with any questions, needs, or concerns. Provided business hours.    Plan: Patient would like to remain full code/full treat but would not want to have prolonged life on artificial life support if interventions are not helping. She is considering who to appoint DPOA-HC. Encouraged her to notify palliative care with decision so we can assist with completion of AD. Also provided information on how to complete outpatient.    Thank you for allowing Palliative Care to participate in this patient's care. Please feel free to call [phone] with any questions or concerns.  "

## 2017-10-13 NOTE — ASSESSMENT & PLAN NOTE
- This is sepsis (without associated acute organ dysfunction).   - Due to urinary tract infection  - resolved

## 2017-10-13 NOTE — PROGRESS NOTES
Lab called with critical result of 6.7 Calcium and K 2.9  at 0026.       Dr. Chadwick notified of critical lab result at 0025.    New Orders in place

## 2017-10-13 NOTE — PROGRESS NOTES
Pt off unit for xray at 0812 with transport via [facility]. NG discontinued from suction. On 2L of O2. Tele monitor tech notified of transport.

## 2017-10-13 NOTE — PROGRESS NOTES
Surgery  HD#2  Looks and feels better  Had a bm  Abdomen tender but improved.  WBC improved  A/P  1.  Small bowel series.   Can remove NGT if contrast reaches colon.  2.  Consideration of surgery if positive for high grade obstruction.  3.  Will follow

## 2017-10-13 NOTE — PROGRESS NOTES
Discussed repeat K and Ca lab results with Dr. Collins.  Also discussed results of small bowel series and Dr. Doll's note.  Order to clamp NGT for 2 hours and if no N/V it can be removed.  Entered to epic.

## 2017-10-13 NOTE — DIETARY
"Nutrition Services: Poor PO on Nutrition Admit Screen   62 year old female with admit dx of small bowel obstruction  Past Pertinent Med Hx: arthritis, asthma, constipation, breast cancer, cervical cancer, DM, heart burn, HTN, rectal cancer, sleep apnea    Pt is currently NPO x1 day. Per RN note on 10/13 - \"NG discontinued from suction\". RD will monitor for diet advancement and adequate PO    Ht: 170.2 cm  Wt: 81.8 kg via stand up scale  BMI: 28.24  Pertinent Labs: K 3.4, Glucose 115, POC glucose 116, 112, 116  Pertinent Meds: rocephin, lovenox, humalog, magnesium sulfate, pericolace, dilaudid (prn)  Fluids: NS infusion @ 150 ml/hr  Skin: wound POA skin tear midline sacrum  GI: Last BM 10/13    PLAN/RECOMMEND -   1) Start PO diet when medically feasible.   2) Weekly weights to monitor fluid and nutrition status  3) Obtain supplement order per RD as needed.    RD following    "

## 2017-10-13 NOTE — PROGRESS NOTES
Two RN skin check completed    Small skin tear noted on sacrum with small amount of dermis showing. Mepelex applied.     Small scab on the left foot near the great toe on the medial side.     No other redness or skin issues noted.

## 2017-10-14 LAB
ANION GAP SERPL CALC-SCNC: 12 MMOL/L (ref 0–11.9)
BACTERIA UR CULT: NORMAL
BACTERIA UR CULT: NORMAL
BUN SERPL-MCNC: 6 MG/DL (ref 8–22)
CALCIUM SERPL-MCNC: 7.8 MG/DL (ref 8.5–10.5)
CHLORIDE SERPL-SCNC: 105 MMOL/L (ref 96–112)
CO2 SERPL-SCNC: 23 MMOL/L (ref 20–33)
CREAT SERPL-MCNC: 0.42 MG/DL (ref 0.5–1.4)
GFR SERPL CREATININE-BSD FRML MDRD: >60 ML/MIN/1.73 M 2
GLUCOSE BLD-MCNC: 129 MG/DL (ref 65–99)
GLUCOSE BLD-MCNC: 70 MG/DL (ref 65–99)
GLUCOSE BLD-MCNC: 86 MG/DL (ref 65–99)
GLUCOSE BLD-MCNC: 89 MG/DL (ref 65–99)
GLUCOSE BLD-MCNC: 90 MG/DL (ref 65–99)
GLUCOSE SERPL-MCNC: 95 MG/DL (ref 65–99)
MAGNESIUM SERPL-MCNC: 2.7 MG/DL (ref 1.5–2.5)
POTASSIUM SERPL-SCNC: 3.1 MMOL/L (ref 3.6–5.5)
SIGNIFICANT IND 70042: NORMAL
SIGNIFICANT IND 70042: NORMAL
SITE SITE: NORMAL
SITE SITE: NORMAL
SODIUM SERPL-SCNC: 140 MMOL/L (ref 135–145)
SOURCE SOURCE: NORMAL
SOURCE SOURCE: NORMAL

## 2017-10-14 PROCEDURE — 700111 HCHG RX REV CODE 636 W/ 250 OVERRIDE (IP): Performed by: INTERNAL MEDICINE

## 2017-10-14 PROCEDURE — 700102 HCHG RX REV CODE 250 W/ 637 OVERRIDE(OP): Performed by: INTERNAL MEDICINE

## 2017-10-14 PROCEDURE — 83735 ASSAY OF MAGNESIUM: CPT

## 2017-10-14 PROCEDURE — 700105 HCHG RX REV CODE 258: Performed by: HOSPITALIST

## 2017-10-14 PROCEDURE — A9270 NON-COVERED ITEM OR SERVICE: HCPCS | Performed by: INTERNAL MEDICINE

## 2017-10-14 PROCEDURE — 80048 BASIC METABOLIC PNL TOTAL CA: CPT

## 2017-10-14 PROCEDURE — 99233 SBSQ HOSP IP/OBS HIGH 50: CPT | Performed by: INTERNAL MEDICINE

## 2017-10-14 PROCEDURE — 770020 HCHG ROOM/CARE - TELE (206)

## 2017-10-14 PROCEDURE — 82962 GLUCOSE BLOOD TEST: CPT | Mod: 91

## 2017-10-14 PROCEDURE — 700111 HCHG RX REV CODE 636 W/ 250 OVERRIDE (IP): Performed by: SURGERY

## 2017-10-14 RX ORDER — ENALAPRIL MALEATE 10 MG/1
20 TABLET ORAL 2 TIMES DAILY
Status: DISCONTINUED | OUTPATIENT
Start: 2017-10-14 | End: 2017-10-31

## 2017-10-14 RX ORDER — OXYCODONE HYDROCHLORIDE 5 MG/1
5 TABLET ORAL
Status: DISCONTINUED | OUTPATIENT
Start: 2017-10-14 | End: 2017-11-04 | Stop reason: HOSPADM

## 2017-10-14 RX ORDER — CARVEDILOL 6.25 MG/1
25 TABLET ORAL 2 TIMES DAILY WITH MEALS
Status: DISCONTINUED | OUTPATIENT
Start: 2017-10-14 | End: 2017-10-16

## 2017-10-14 RX ORDER — OXYCODONE HYDROCHLORIDE 10 MG/1
10 TABLET ORAL
Status: DISCONTINUED | OUTPATIENT
Start: 2017-10-14 | End: 2017-10-18

## 2017-10-14 RX ORDER — MORPHINE SULFATE 15 MG/1
15 TABLET, FILM COATED, EXTENDED RELEASE ORAL 3 TIMES DAILY
Status: DISCONTINUED | OUTPATIENT
Start: 2017-10-14 | End: 2017-11-01

## 2017-10-14 RX ADMIN — ACETAMINOPHEN 650 MG: 325 TABLET, FILM COATED ORAL at 00:45

## 2017-10-14 RX ADMIN — FAMOTIDINE 20 MG: 10 INJECTION, SOLUTION INTRAVENOUS at 08:50

## 2017-10-14 RX ADMIN — OXYCODONE HYDROCHLORIDE 5 MG: 5 TABLET ORAL at 11:53

## 2017-10-14 RX ADMIN — SODIUM CHLORIDE: 9 INJECTION, SOLUTION INTRAVENOUS at 13:31

## 2017-10-14 RX ADMIN — ENALAPRIL MALEATE 20 MG: 10 TABLET ORAL at 21:49

## 2017-10-14 RX ADMIN — POTASSIUM BICARBONATE 25 MEQ: 25 TABLET, EFFERVESCENT ORAL at 23:06

## 2017-10-14 RX ADMIN — LABETALOL HYDROCHLORIDE 10 MG: 5 INJECTION, SOLUTION INTRAVENOUS at 00:30

## 2017-10-14 RX ADMIN — POTASSIUM BICARBONATE 25 MEQ: 25 TABLET, EFFERVESCENT ORAL at 11:33

## 2017-10-14 RX ADMIN — OXYCODONE HYDROCHLORIDE 10 MG: 10 TABLET ORAL at 23:08

## 2017-10-14 RX ADMIN — FAMOTIDINE 20 MG: 10 INJECTION, SOLUTION INTRAVENOUS at 21:49

## 2017-10-14 RX ADMIN — MORPHINE SULFATE 15 MG: 15 TABLET, FILM COATED, EXTENDED RELEASE ORAL at 17:19

## 2017-10-14 RX ADMIN — HYDROMORPHONE HYDROCHLORIDE 1 MG: 1 INJECTION, SOLUTION INTRAMUSCULAR; INTRAVENOUS; SUBCUTANEOUS at 04:29

## 2017-10-14 RX ADMIN — ENOXAPARIN SODIUM 40 MG: 100 INJECTION SUBCUTANEOUS at 08:50

## 2017-10-14 RX ADMIN — ENALAPRIL MALEATE 20 MG: 10 TABLET ORAL at 11:54

## 2017-10-14 RX ADMIN — SODIUM CHLORIDE: 9 INJECTION, SOLUTION INTRAVENOUS at 21:49

## 2017-10-14 RX ADMIN — MORPHINE SULFATE 15 MG: 15 TABLET, FILM COATED, EXTENDED RELEASE ORAL at 21:49

## 2017-10-14 RX ADMIN — OXYCODONE HYDROCHLORIDE 10 MG: 10 TABLET ORAL at 16:25

## 2017-10-14 RX ADMIN — OXYCODONE HYDROCHLORIDE 5 MG: 5 TABLET ORAL at 00:45

## 2017-10-14 RX ADMIN — CEFTRIAXONE 2 G: 2 INJECTION, SOLUTION INTRAVENOUS at 08:51

## 2017-10-14 RX ADMIN — HYDROMORPHONE HYDROCHLORIDE 1 MG: 1 INJECTION, SOLUTION INTRAMUSCULAR; INTRAVENOUS; SUBCUTANEOUS at 08:50

## 2017-10-14 RX ADMIN — CARVEDILOL 25 MG: 6.25 TABLET, FILM COATED ORAL at 17:18

## 2017-10-14 RX ADMIN — CARVEDILOL 25 MG: 6.25 TABLET, FILM COATED ORAL at 11:33

## 2017-10-14 RX ADMIN — HYDROMORPHONE HYDROCHLORIDE 1 MG: 1 INJECTION, SOLUTION INTRAMUSCULAR; INTRAVENOUS; SUBCUTANEOUS at 13:34

## 2017-10-14 RX ADMIN — OXYCODONE HYDROCHLORIDE 5 MG: 5 TABLET ORAL at 05:44

## 2017-10-14 ASSESSMENT — ENCOUNTER SYMPTOMS
NAUSEA: 1
SPUTUM PRODUCTION: 0
COUGH: 0
ABDOMINAL PAIN: 1
DIARRHEA: 0
CONSTIPATION: 0
CHILLS: 0
FEVER: 0
MYALGIAS: 0
FALLS: 0
LOSS OF CONSCIOUSNESS: 0
VOMITING: 0
STRIDOR: 0
DEPRESSION: 0
DIZZINESS: 0
PALPITATIONS: 0
NECK PAIN: 0
SHORTNESS OF BREATH: 0

## 2017-10-14 ASSESSMENT — PAIN SCALES - GENERAL
PAINLEVEL_OUTOF10: 4
PAINLEVEL_OUTOF10: 8
PAINLEVEL_OUTOF10: 4
PAINLEVEL_OUTOF10: 6
PAINLEVEL_OUTOF10: 4
PAINLEVEL_OUTOF10: 4
PAINLEVEL_OUTOF10: 8
PAINLEVEL_OUTOF10: 6
PAINLEVEL_OUTOF10: 10
PAINLEVEL_OUTOF10: 5
PAINLEVEL_OUTOF10: 10
PAINLEVEL_OUTOF10: 9
PAINLEVEL_OUTOF10: 5
PAINLEVEL_OUTOF10: 5
PAINLEVEL_OUTOF10: 4
PAINLEVEL_OUTOF10: 9

## 2017-10-14 NOTE — PROGRESS NOTES
Hospitalist paged regarding MEWS score of 7.   Pt is tachycardic- confirmed sinus tach from tele monitor room    No call back.     Diane from ICU rapid team visited with pt. Pt is still hypertensive, but her pain has gone down, respirations went down and heart rate jumped down to 100.    Will continue to monitor

## 2017-10-14 NOTE — PROGRESS NOTES
[facility]ist Progress Note    Date of Service: 10/14/2017    Chief Complaint  62 y.o. female admitted 10/12/2017 with abdominal pain.    Interval Problem Update  Patient began having generalized abdominal pain 2 days prior to admission.  Pain continued to worsen so she presented to the emergency department.  Patient also complained of nausea and vomiting as well as constipation.  Patient was noted to have a small bowel obstruction, did have an NG placed to suction.  Patient does have a small bowel follow-through 10/13, no sign of small bowel obstruction.  Patient does have a history of breast cancer, anal and rectal cancer with liver metastasis as well as cervical cancer.  Overnight patient did began having bowel movements, pain is improved.  Discussed plan patient edition with nurse at bedside    Consultants/Specialty  Surgery-Dr. Doll    Disposition  Patient requires additional treatment in the hospital        Review of Systems   Constitutional: Negative for chills, fever and malaise/fatigue.   HENT: Negative for congestion.    Respiratory: Negative for cough, sputum production, shortness of breath and stridor.    Cardiovascular: Negative for chest pain, palpitations and leg swelling.   Gastrointestinal: Positive for abdominal pain and nausea. Negative for constipation, diarrhea and vomiting.   Genitourinary: Negative for dysuria, frequency and urgency.   Musculoskeletal: Negative for falls, myalgias and neck pain.   Neurological: Negative for dizziness and loss of consciousness.   Psychiatric/Behavioral: Negative for depression and suicidal ideas.   All other systems reviewed and are negative.     Physical Exam  Laboratory/Imaging   Hemodynamics  Temp (24hrs), Av.7 °C (98 °F), Min:36.2 °C (97.1 °F), Max:37 °C (98.6 °F)   Temperature: 36.8 °C (98.3 °F)  Pulse  Av.2  Min: 90  Max: 142   Blood Pressure: 154/74      Respiratory      Respiration: 20, Pulse Oximetry: 97 %              Fluids    Intake/Output Summary (Last 24 hours) at 10/14/17 0750  Last data filed at 10/14/17 0400   Gross per 24 hour   Intake             3750 ml   Output             1100 ml   Net             2650 ml       Nutrition  Orders Placed This Encounter   Procedures   • Diet NPO     Standing Status:   Standing     Number of Occurrences:   1     Order Specific Question:   Restrict to:     Answer:   Strict [1]     Physical Exam   Constitutional: She is oriented to person, place, and time. No distress.   HENT:   Head: Normocephalic and atraumatic.   Mouth/Throat: No oropharyngeal exudate.   Eyes: Right eye exhibits no discharge. Left eye exhibits no discharge. No scleral icterus.   Neck: Neck supple. No tracheal deviation present.   Cardiovascular: Normal rate and regular rhythm.    Murmur heard.  Pulmonary/Chest: Effort normal and breath sounds normal. No stridor. No respiratory distress. She has no wheezes. She exhibits no tenderness.   Abdominal: Soft. She exhibits no distension. Bowel sounds are decreased. There is tenderness.   Improved bowel sounds   Musculoskeletal: She exhibits no edema or tenderness.   Neurological: She is alert and oriented to person, place, and time.   Skin: Skin is warm and dry. She is not diaphoretic. No erythema.   Psychiatric: She is slowed.   Odd affect    Nursing note and vitals reviewed.      Recent Labs      10/12/17   1000  10/13/17   0200   WBC  17.3*  13.8*   RBC  3.87*  3.57*   HEMOGLOBIN  11.4*  10.3*   HEMATOCRIT  35.6*  32.8*   MCV  92.0  91.9   MCH  29.5  28.9   MCHC  32.0*  31.4*   RDW  51.7*  52.3*   PLATELETCT  418  451*   MPV  9.4  9.5     Recent Labs      10/12/17   2320  10/13/17   1051  10/14/17   0344   SODIUM  139  140  140   POTASSIUM  2.9*  3.4*  3.1*   CHLORIDE  108  106  105   CO2  16*  24  23   GLUCOSE  117*  115*  95   BUN  9  8  6*   CREATININE  0.49*  0.54  0.42*   CALCIUM  6.7*  8.2*  7.8*                      Assessment/Plan     Anal carcinoma (CMS-HCC)-  (present on admission)   Assessment & Plan    - Chronic, palliative care to see, outpatient follow-up        Sepsis (CMS-HCC)   Assessment & Plan    - This is sepsis (without associated acute organ dysfunction).   - Due to urinary tract infection, improving        Hyponatremia- (present on admission)   Assessment & Plan    - Resolved        Normocytic anemia- (present on admission)   Assessment & Plan    - Chronic, no sign of gross bleeding        SBO (small bowel obstruction)- (present on admission)   Assessment & Plan    -Small bowel follow-through 10/13 with no obstruction, NG removed, patient will be placed on clear liquid diet  - Bowel sounds improved  -Additional recommendations per surgery          Rectal cancer (CMS-HCC)- (present on admission)   Assessment & Plan    - Patient is on experimental drug as an outpatient, continue outpatient follow-up        Hypokalemia- (present on admission)   Assessment & Plan    -Worse, will give oral potassium and obtain magnesium level  - Repeat BMP in the morning        UTI (urinary tract infection)- (present on admission)   Assessment & Plan    - Continue Rocephin            Reviewed items::  Labs reviewed, Medications reviewed and Radiology images reviewed  DVT prophylaxis pharmacological::  Enoxaparin (Lovenox)  Antibiotics:  Treating active infection/contamination beyond 24 hours perioperative coverage

## 2017-10-14 NOTE — CARE PLAN
Problem: Safety  Goal: Will remain free from falls  Outcome: PROGRESSING AS EXPECTED  Pt educated on the importance of calling a staff member before getting out of bed, pt calls appropriately, call light within reach, bed in lowest position, treaded socks on and hourly rounding in place    Problem: Venous Thromboembolism (VTW)/Deep Vein Thrombosis (DVT) Prevention:  Goal: Patient will participate in Venous Thrombosis (VTE)/Deep Vein Thrombosis (DVT)Prevention Measures  Outcome: PROGRESSING AS EXPECTED  Pt educated on the importance of participating in DVT/VTE prophylaxis, pt wearing SCD's and receiving Lovenox during the day

## 2017-10-14 NOTE — PROGRESS NOTES
Surgical Progress Note    Author: Onofre Goyal Date & Time created: 10/14/2017   6:14 AM     Interval Events:  Mild nausea and some bloating.  UGI/SBFT nl  ROS  Hemodynamics:  Temp (24hrs), Av.7 °C (98 °F), Min:36.2 °C (97.1 °F), Max:37 °C (98.6 °F)  Temperature: 36.8 °C (98.3 °F)  Pulse  Av.2  Min: 90  Max: 142  Blood Pressure: 154/74     Respiratory:    Respiration: 20, Pulse Oximetry: 97 %           Neuro:  GCS       Fluids:    Intake/Output Summary (Last 24 hours) at 10/14/17 0614  Last data filed at 10/14/17 0400   Gross per 24 hour   Intake             3750 ml   Output             1100 ml   Net             2650 ml     Weight: 81.8 kg (180 lb 5.4 oz)  Current Diet Order   Procedures   • Diet NPO     Physical Exam  Labs:  Recent Results (from the past 24 hour(s))   BASIC METABOLIC PANEL    Collection Time: 10/13/17 10:51 AM   Result Value Ref Range    Sodium 140 135 - 145 mmol/L    Potassium 3.4 (L) 3.6 - 5.5 mmol/L    Chloride 106 96 - 112 mmol/L    Co2 24 20 - 33 mmol/L    Glucose 115 (H) 65 - 99 mg/dL    Bun 8 8 - 22 mg/dL    Creatinine 0.54 0.50 - 1.40 mg/dL    Calcium 8.2 (L) 8.5 - 10.5 mg/dL    Anion Gap 10.0 0.0 - 11.9   ESTIMATED GFR    Collection Time: 10/13/17 10:51 AM   Result Value Ref Range    GFR If African American >60 >60 mL/min/1.73 m 2    GFR If Non African American >60 >60 mL/min/1.73 m 2   ACCU-CHEK GLUCOSE    Collection Time: 10/13/17 11:57 AM   Result Value Ref Range    Glucose - Accu-Ck 116 (H) 65 - 99 mg/dL   ACCU-CHEK GLUCOSE    Collection Time: 10/13/17  6:38 PM   Result Value Ref Range    Glucose - Accu-Ck 100 (H) 65 - 99 mg/dL   ACCU-CHEK GLUCOSE    Collection Time: 10/14/17 12:59 AM   Result Value Ref Range    Glucose - Accu-Ck 90 65 - 99 mg/dL   MAGNESIUM    Collection Time: 10/14/17  3:44 AM   Result Value Ref Range    Magnesium 2.7 (H) 1.5 - 2.5 mg/dL   BASIC METABOLIC PANEL    Collection Time: 10/14/17  3:44 AM   Result Value Ref Range    Sodium 140 135 - 145  mmol/L    Potassium 3.1 (L) 3.6 - 5.5 mmol/L    Chloride 105 96 - 112 mmol/L    Co2 23 20 - 33 mmol/L    Glucose 95 65 - 99 mg/dL    Bun 6 (L) 8 - 22 mg/dL    Creatinine 0.42 (L) 0.50 - 1.40 mg/dL    Calcium 7.8 (L) 8.5 - 10.5 mg/dL    Anion Gap 12.0 (H) 0.0 - 11.9   ESTIMATED GFR    Collection Time: 10/14/17  3:44 AM   Result Value Ref Range    GFR If African American >60 >60 mL/min/1.73 m 2    GFR If Non African American >60 >60 mL/min/1.73 m 2   ACCU-CHEK GLUCOSE    Collection Time: 10/14/17  5:40 AM   Result Value Ref Range    Glucose - Accu-Ck 89 65 - 99 mg/dL     Medical Decision Making, by Problem:  Active Hospital Problems    Diagnosis   • Anal carcinoma (CMS-HCC) [C21.0]     Priority: Medium     Class: Stage 3   • SBO (small bowel obstruction) [K56.609]   • Normocytic anemia [D64.9]   • Hyponatremia [E87.1]   • Sepsis (CMS-HCC) [A41.9]   • Rectal cancer (CMS-HCC) [C20]   • Hypokalemia [E87.6]   • UTI (urinary tract infection) [N39.0]     Plan:  May try diet; following    Quality Measures:  Quality-Core Measures

## 2017-10-14 NOTE — PROGRESS NOTES
AOx4  Pain 3/10 in her abdomen- no pain meds needed at this time.     Abdomen is soft and semi-tender. NG tube removed during day shift. Tolerating well so far.     Power port assessed- dressing is CDI.     Pt still having incontinent loose stools.     Pt looking much better than last night

## 2017-10-14 NOTE — PROGRESS NOTES
Patient tolerated clamped NG; no nausea and discomfort noted. NG tube removed; patient educated on procedure prior to removal. All questions answered.

## 2017-10-14 NOTE — CARE PLAN
Problem: Pain Management  Goal: Pain level will decrease to patient's comfort goal    Intervention: Follow pain managment plan developed in collaboration with patient and Interdisciplinary Team  Pain remains unrelieved with oxy 5mg PRN. MD notified and received orders to administer oxy 5-10mg PRN.      Problem: Skin Integrity  Goal: Risk for impaired skin integrity will decrease    Intervention: Implement precautions to protect skin integrity in collaboration with the interdisciplinary team  Skin tear noted on left buttock. Mepilex in place.

## 2017-10-14 NOTE — PROGRESS NOTES
Monitor Summary: SR-ST  with frequent PVC and rare PAC.  .16/.08/.36  Per tele monitor flow sheet.

## 2017-10-15 LAB
ANION GAP SERPL CALC-SCNC: 5 MMOL/L (ref 0–11.9)
BUN SERPL-MCNC: 5 MG/DL (ref 8–22)
CALCIUM SERPL-MCNC: 7.4 MG/DL (ref 8.5–10.5)
CHLORIDE SERPL-SCNC: 103 MMOL/L (ref 96–112)
CO2 SERPL-SCNC: 27 MMOL/L (ref 20–33)
CREAT SERPL-MCNC: 0.47 MG/DL (ref 0.5–1.4)
EKG IMPRESSION: NORMAL
ERYTHROCYTE [DISTWIDTH] IN BLOOD BY AUTOMATED COUNT: 51.6 FL (ref 35.9–50)
GFR SERPL CREATININE-BSD FRML MDRD: >60 ML/MIN/1.73 M 2
GLUCOSE BLD-MCNC: 66 MG/DL (ref 65–99)
GLUCOSE BLD-MCNC: 67 MG/DL (ref 65–99)
GLUCOSE BLD-MCNC: 77 MG/DL (ref 65–99)
GLUCOSE BLD-MCNC: 77 MG/DL (ref 65–99)
GLUCOSE SERPL-MCNC: 74 MG/DL (ref 65–99)
HCT VFR BLD AUTO: 28.1 % (ref 37–47)
HGB BLD-MCNC: 9.1 G/DL (ref 12–16)
MCH RBC QN AUTO: 29.6 PG (ref 27–33)
MCHC RBC AUTO-ENTMCNC: 32.4 G/DL (ref 33.6–35)
MCV RBC AUTO: 91.5 FL (ref 81.4–97.8)
PLATELET # BLD AUTO: 355 K/UL (ref 164–446)
PMV BLD AUTO: 9 FL (ref 9–12.9)
POTASSIUM SERPL-SCNC: 3.5 MMOL/L (ref 3.6–5.5)
RBC # BLD AUTO: 3.07 M/UL (ref 4.2–5.4)
SODIUM SERPL-SCNC: 135 MMOL/L (ref 135–145)
TROPONIN I SERPL-MCNC: 0.01 NG/ML (ref 0–0.04)
WBC # BLD AUTO: 8.6 K/UL (ref 4.8–10.8)

## 2017-10-15 PROCEDURE — 93010 ELECTROCARDIOGRAM REPORT: CPT | Performed by: INTERNAL MEDICINE

## 2017-10-15 PROCEDURE — 700102 HCHG RX REV CODE 250 W/ 637 OVERRIDE(OP): Performed by: INTERNAL MEDICINE

## 2017-10-15 PROCEDURE — 85027 COMPLETE CBC AUTOMATED: CPT

## 2017-10-15 PROCEDURE — 700111 HCHG RX REV CODE 636 W/ 250 OVERRIDE (IP): Performed by: INTERNAL MEDICINE

## 2017-10-15 PROCEDURE — 84484 ASSAY OF TROPONIN QUANT: CPT

## 2017-10-15 PROCEDURE — 82962 GLUCOSE BLOOD TEST: CPT | Mod: 91

## 2017-10-15 PROCEDURE — 770006 HCHG ROOM/CARE - MED/SURG/GYN SEMI*

## 2017-10-15 PROCEDURE — 99233 SBSQ HOSP IP/OBS HIGH 50: CPT | Performed by: INTERNAL MEDICINE

## 2017-10-15 PROCEDURE — A9270 NON-COVERED ITEM OR SERVICE: HCPCS | Performed by: INTERNAL MEDICINE

## 2017-10-15 PROCEDURE — 700111 HCHG RX REV CODE 636 W/ 250 OVERRIDE (IP): Performed by: SURGERY

## 2017-10-15 PROCEDURE — 93005 ELECTROCARDIOGRAM TRACING: CPT | Performed by: INTERNAL MEDICINE

## 2017-10-15 PROCEDURE — 700105 HCHG RX REV CODE 258: Performed by: HOSPITALIST

## 2017-10-15 PROCEDURE — 80048 BASIC METABOLIC PNL TOTAL CA: CPT

## 2017-10-15 RX ORDER — PHENAZOPYRIDINE HYDROCHLORIDE 100 MG/1
100 TABLET, FILM COATED ORAL
Status: DISCONTINUED | OUTPATIENT
Start: 2017-10-15 | End: 2017-11-01

## 2017-10-15 RX ADMIN — SODIUM CHLORIDE: 9 INJECTION, SOLUTION INTRAVENOUS at 17:56

## 2017-10-15 RX ADMIN — POTASSIUM BICARBONATE 25 MEQ: 25 TABLET, EFFERVESCENT ORAL at 08:13

## 2017-10-15 RX ADMIN — OXYCODONE HYDROCHLORIDE 10 MG: 10 TABLET ORAL at 08:19

## 2017-10-15 RX ADMIN — PHENAZOPYRIDINE HYDROCHLORIDE 100 MG: 100 TABLET ORAL at 11:53

## 2017-10-15 RX ADMIN — PHENAZOPYRIDINE HYDROCHLORIDE 100 MG: 100 TABLET ORAL at 18:57

## 2017-10-15 RX ADMIN — POTASSIUM BICARBONATE 25 MEQ: 25 TABLET, EFFERVESCENT ORAL at 20:45

## 2017-10-15 RX ADMIN — CARVEDILOL 25 MG: 6.25 TABLET, FILM COATED ORAL at 17:53

## 2017-10-15 RX ADMIN — ONDANSETRON 4 MG: 2 INJECTION INTRAMUSCULAR; INTRAVENOUS at 18:58

## 2017-10-15 RX ADMIN — ENALAPRIL MALEATE 20 MG: 10 TABLET ORAL at 20:46

## 2017-10-15 RX ADMIN — LIDOCAINE HYDROCHLORIDE 30 ML: 20 SOLUTION OROPHARYNGEAL at 13:19

## 2017-10-15 RX ADMIN — OXYCODONE HYDROCHLORIDE 10 MG: 10 TABLET ORAL at 17:53

## 2017-10-15 RX ADMIN — SODIUM CHLORIDE: 9 INJECTION, SOLUTION INTRAVENOUS at 11:53

## 2017-10-15 RX ADMIN — ACETAMINOPHEN 650 MG: 325 TABLET, FILM COATED ORAL at 17:52

## 2017-10-15 RX ADMIN — OXYCODONE HYDROCHLORIDE 10 MG: 10 TABLET ORAL at 02:47

## 2017-10-15 RX ADMIN — CEFTRIAXONE 2 G: 2 INJECTION, SOLUTION INTRAVENOUS at 08:16

## 2017-10-15 RX ADMIN — OXYCODONE HYDROCHLORIDE 10 MG: 10 TABLET ORAL at 11:20

## 2017-10-15 RX ADMIN — ACETAMINOPHEN 650 MG: 325 TABLET, FILM COATED ORAL at 08:19

## 2017-10-15 RX ADMIN — SODIUM CHLORIDE: 9 INJECTION, SOLUTION INTRAVENOUS at 04:40

## 2017-10-15 RX ADMIN — HYDROMORPHONE HYDROCHLORIDE 1 MG: 1 INJECTION, SOLUTION INTRAMUSCULAR; INTRAVENOUS; SUBCUTANEOUS at 20:47

## 2017-10-15 RX ADMIN — HYDROMORPHONE HYDROCHLORIDE 1 MG: 1 INJECTION, SOLUTION INTRAMUSCULAR; INTRAVENOUS; SUBCUTANEOUS at 04:48

## 2017-10-15 RX ADMIN — ENOXAPARIN SODIUM 40 MG: 100 INJECTION SUBCUTANEOUS at 08:13

## 2017-10-15 RX ADMIN — FAMOTIDINE 20 MG: 10 INJECTION, SOLUTION INTRAVENOUS at 08:13

## 2017-10-15 RX ADMIN — MORPHINE SULFATE 15 MG: 15 TABLET, FILM COATED, EXTENDED RELEASE ORAL at 06:54

## 2017-10-15 RX ADMIN — MORPHINE SULFATE 15 MG: 15 TABLET, FILM COATED, EXTENDED RELEASE ORAL at 13:20

## 2017-10-15 RX ADMIN — FAMOTIDINE 20 MG: 10 INJECTION, SOLUTION INTRAVENOUS at 20:45

## 2017-10-15 ASSESSMENT — PAIN SCALES - GENERAL
PAINLEVEL_OUTOF10: 8
PAINLEVEL_OUTOF10: 8
PAINLEVEL_OUTOF10: 10
PAINLEVEL_OUTOF10: 8
PAINLEVEL_OUTOF10: 5
PAINLEVEL_OUTOF10: 9
PAINLEVEL_OUTOF10: 4

## 2017-10-15 ASSESSMENT — ENCOUNTER SYMPTOMS
MYALGIAS: 0
FALLS: 0
SHORTNESS OF BREATH: 0
VOMITING: 0
DEPRESSION: 0
SPUTUM PRODUCTION: 0
STRIDOR: 0
LOSS OF CONSCIOUSNESS: 0
PALPITATIONS: 0
DIZZINESS: 0
NAUSEA: 0
HEADACHES: 0
ABDOMINAL PAIN: 1
FEVER: 0
CHILLS: 0
TINGLING: 0

## 2017-10-15 NOTE — PROGRESS NOTES
[facility]ist Progress Note    Date of Service: 10/15/2017    Chief Complaint  62 y.o. female admitted 10/12/2017 with abdominal pain.    Interval Problem Update  Patient began having generalized abdominal pain 2 days prior to admission.  Pain continued to worsen so she presented to the emergency department.  Patient also complained of nausea and vomiting as well as constipation.  Patient was noted to have a small bowel obstruction, did have an NG placed to suction.  Patient does have a small bowel follow-through 10/13, no sign of small bowel obstruction.  Patient does have a history of breast cancer, anal and rectal cancer with liver metastasis as well as cervical cancer.  Overnight patient did began having bowel movements, pain is improved.  Discussed plan patient edition with nurse at bedside    Consultants/Specialty  Surgery-Dr. Doll    Disposition  Patient requires additional treatment in the hospital        Review of Systems   Constitutional: Negative for chills and fever.   HENT: Negative for congestion.    Respiratory: Negative for sputum production, shortness of breath and stridor.    Cardiovascular: Negative for chest pain and palpitations.   Gastrointestinal: Positive for abdominal pain. Negative for nausea and vomiting.   Genitourinary: Negative for dysuria and urgency.   Musculoskeletal: Negative for falls and myalgias.   Neurological: Negative for dizziness, tingling, loss of consciousness and headaches.   Psychiatric/Behavioral: Negative for depression and suicidal ideas.   All other systems reviewed and are negative.     Physical Exam  Laboratory/Imaging   Hemodynamics  Temp (24hrs), Av.3 °C (97.3 °F), Min:36.1 °C (96.9 °F), Max:36.6 °C (97.9 °F)   Temperature: 36.1 °C (96.9 °F)  Pulse  Av.3  Min: 77  Max: 142   Blood Pressure: 131/69      Respiratory      Respiration: 16, Pulse Oximetry: 97 %             Fluids    Intake/Output Summary (Last 24 hours) at 10/15/17 3204  Last data  filed at 10/15/17 0400   Gross per 24 hour   Intake             2760 ml   Output              200 ml   Net             2560 ml       Nutrition  Orders Placed This Encounter   Procedures   • DIET ORDER     Standing Status:   Standing     Number of Occurrences:   1     Order Specific Question:   Diet:     Answer:   Clear Liquid [10]     Physical Exam   Constitutional: She is oriented to person, place, and time. She appears well-developed. No distress.   HENT:   Head: Normocephalic and atraumatic.   Eyes: Right eye exhibits no discharge. Left eye exhibits no discharge.   Neck: Neck supple.   Cardiovascular: Normal rate and regular rhythm.  Exam reveals no gallop.    Murmur heard.  Pulmonary/Chest: Effort normal and breath sounds normal. No stridor. No respiratory distress. She exhibits no tenderness.   Abdominal: Soft. Bowel sounds are normal. She exhibits no distension. There is tenderness.   Musculoskeletal: She exhibits no edema.   Neurological: She is alert and oriented to person, place, and time. No cranial nerve deficit.   Skin: Skin is warm and dry. She is not diaphoretic.   Psychiatric: She is slowed.   Odd affect    Nursing note and vitals reviewed.      Recent Labs      10/12/17   1000  10/13/17   0200   WBC  17.3*  13.8*   RBC  3.87*  3.57*   HEMOGLOBIN  11.4*  10.3*   HEMATOCRIT  35.6*  32.8*   MCV  92.0  91.9   MCH  29.5  28.9   MCHC  32.0*  31.4*   RDW  51.7*  52.3*   PLATELETCT  418  451*   MPV  9.4  9.5     Recent Labs      10/12/17   2320  10/13/17   1051  10/14/17   0344   SODIUM  139  140  140   POTASSIUM  2.9*  3.4*  3.1*   CHLORIDE  108  106  105   CO2  16*  24  23   GLUCOSE  117*  115*  95   BUN  9  8  6*   CREATININE  0.49*  0.54  0.42*   CALCIUM  6.7*  8.2*  7.8*                      Assessment/Plan     Anal carcinoma (CMS-HCC)- (present on admission)   Assessment & Plan    - Chronic, palliative care to see, outpatient follow-up        Sepsis (CMS-HCC)   Assessment & Plan    - This is sepsis  (without associated acute organ dysfunction).   - Due to urinary tract infection, improving        Hyponatremia- (present on admission)   Assessment & Plan    - Resolved        Normocytic anemia- (present on admission)   Assessment & Plan    - Chronic, no sign of gross bleeding        SBO (small bowel obstruction)- (present on admission)   Assessment & Plan    -Small bowel follow-through 10/13 with no obstruction, NG removed, patient will be placed on clear liquid diet  - Bowel sounds improved  -Additional recommendations per surgery          Rectal cancer (CMS-HCC)- (present on admission)   Assessment & Plan    - Patient is on experimental drug as an outpatient, continue outpatient follow-up        Hypokalemia- (present on admission)   Assessment & Plan    -Worse, will give oral potassium and obtain magnesium level  - Repeat BMP in the morning        UTI (urinary tract infection)- (present on admission)   Assessment & Plan    - Continue Rocephin            Reviewed items::  Labs reviewed, Medications reviewed and Radiology images reviewed  DVT prophylaxis pharmacological::  Enoxaparin (Lovenox)  Antibiotics:  Treating active infection/contamination beyond 24 hours perioperative coverage      Addendum: This patient began having lower quadrant chest pain, likely gastrointestinal, did give a GI cocktail which markedly improved her pain, also got a troponin and EKG with no sign of coronary involvement

## 2017-10-15 NOTE — CARE PLAN
Problem: Infection  Goal: Will remain free from infection  Outcome: PROGRESSING AS EXPECTED  Antibiotics given IV as ordered    Problem: Respiratory:  Intervention: ENCOURAGE USE OF INCENTIVE SPIROMETER  Patient uses incentive spirometer 10 time hour while awake.

## 2017-10-15 NOTE — PROGRESS NOTES
Tele called, patient moved to different room because of signal loss. Patient SR 70's with clear signal.

## 2017-10-15 NOTE — PROGRESS NOTES
Patient called out having chest pain.  Patient localized pain to lower chest midline below sternum.  Patient vitals stable, Tele reported patient monitor sinus rhythm 77.  Spoke with MD.  GI cocktail ordered, troponin and EKG.  MD made aware of EKG results.  Patient had good results with GI cocktail.

## 2017-10-15 NOTE — PROGRESS NOTES
"A/Ox4, VSS.  Blood pressure 107/62, pulse 77, temperature 36.1 °C (97 °F), resp. rate 16, height 1.702 m (5' 7.01\"), weight 81.8 kg (180 lb 5.4 oz), SpO2 99 %, not currently breastfeeding.  Pt states mild abdominal pain, medicated per MAR.  Abdomen soft, tender.  Denies n/v, -flatus, +hypoactive BSx4.  RA, 1L while sleeping. Call out for CPAP to RT.  Clears, tolerates fine.   ACHS.  Up with hand held assist.  +void in BR.  Call light within reach, calls appropriately.  Bed in low and locked position.    "

## 2017-10-16 PROBLEM — R07.9 CHEST PAIN: Status: ACTIVE | Noted: 2017-10-16

## 2017-10-16 LAB
ANION GAP SERPL CALC-SCNC: 8 MMOL/L (ref 0–11.9)
BUN SERPL-MCNC: 5 MG/DL (ref 8–22)
CALCIUM SERPL-MCNC: 7.9 MG/DL (ref 8.5–10.5)
CHLORIDE SERPL-SCNC: 102 MMOL/L (ref 96–112)
CO2 SERPL-SCNC: 26 MMOL/L (ref 20–33)
CREAT SERPL-MCNC: 0.48 MG/DL (ref 0.5–1.4)
EKG IMPRESSION: NORMAL
EKG IMPRESSION: NORMAL
GFR SERPL CREATININE-BSD FRML MDRD: >60 ML/MIN/1.73 M 2
GLUCOSE BLD-MCNC: 100 MG/DL (ref 65–99)
GLUCOSE BLD-MCNC: 111 MG/DL (ref 65–99)
GLUCOSE BLD-MCNC: 58 MG/DL (ref 65–99)
GLUCOSE BLD-MCNC: 70 MG/DL (ref 65–99)
GLUCOSE BLD-MCNC: 74 MG/DL (ref 65–99)
GLUCOSE BLD-MCNC: 98 MG/DL (ref 65–99)
GLUCOSE SERPL-MCNC: 58 MG/DL (ref 65–99)
MAGNESIUM SERPL-MCNC: 1.7 MG/DL (ref 1.5–2.5)
POTASSIUM SERPL-SCNC: 3.4 MMOL/L (ref 3.6–5.5)
SODIUM SERPL-SCNC: 136 MMOL/L (ref 135–145)

## 2017-10-16 PROCEDURE — 93005 ELECTROCARDIOGRAM TRACING: CPT | Performed by: INTERNAL MEDICINE

## 2017-10-16 PROCEDURE — A9270 NON-COVERED ITEM OR SERVICE: HCPCS | Performed by: INTERNAL MEDICINE

## 2017-10-16 PROCEDURE — 700111 HCHG RX REV CODE 636 W/ 250 OVERRIDE (IP): Performed by: INTERNAL MEDICINE

## 2017-10-16 PROCEDURE — 93010 ELECTROCARDIOGRAM REPORT: CPT | Mod: 76 | Performed by: INTERNAL MEDICINE

## 2017-10-16 PROCEDURE — 700101 HCHG RX REV CODE 250: Performed by: INTERNAL MEDICINE

## 2017-10-16 PROCEDURE — 700111 HCHG RX REV CODE 636 W/ 250 OVERRIDE (IP): Performed by: SURGERY

## 2017-10-16 PROCEDURE — 700102 HCHG RX REV CODE 250 W/ 637 OVERRIDE(OP): Performed by: INTERNAL MEDICINE

## 2017-10-16 PROCEDURE — 770020 HCHG ROOM/CARE - TELE (206)

## 2017-10-16 PROCEDURE — 99232 SBSQ HOSP IP/OBS MODERATE 35: CPT | Performed by: INTERNAL MEDICINE

## 2017-10-16 PROCEDURE — 82962 GLUCOSE BLOOD TEST: CPT

## 2017-10-16 PROCEDURE — 83735 ASSAY OF MAGNESIUM: CPT

## 2017-10-16 PROCEDURE — A6212 FOAM DRG <=16 SQ IN W/BORDER: HCPCS | Performed by: INTERNAL MEDICINE

## 2017-10-16 PROCEDURE — 700105 HCHG RX REV CODE 258: Performed by: HOSPITALIST

## 2017-10-16 PROCEDURE — 80048 BASIC METABOLIC PNL TOTAL CA: CPT

## 2017-10-16 RX ORDER — METOPROLOL TARTRATE 100 MG/1
100 TABLET ORAL 2 TIMES DAILY
Status: DISCONTINUED | OUTPATIENT
Start: 2017-10-16 | End: 2017-10-31

## 2017-10-16 RX ORDER — DEXTROSE MONOHYDRATE, SODIUM CHLORIDE, AND POTASSIUM CHLORIDE 50; 1.49; 9 G/1000ML; G/1000ML; G/1000ML
INJECTION, SOLUTION INTRAVENOUS CONTINUOUS
Status: DISCONTINUED | OUTPATIENT
Start: 2017-10-16 | End: 2017-11-04 | Stop reason: HOSPADM

## 2017-10-16 RX ORDER — CALCIUM CARBONATE 500 MG/1
500 TABLET, CHEWABLE ORAL 2 TIMES DAILY PRN
Status: DISCONTINUED | OUTPATIENT
Start: 2017-10-16 | End: 2017-11-04 | Stop reason: HOSPADM

## 2017-10-16 RX ORDER — POTASSIUM CHLORIDE 20 MEQ/1
20 TABLET, EXTENDED RELEASE ORAL 2 TIMES DAILY
Status: DISCONTINUED | OUTPATIENT
Start: 2017-10-16 | End: 2017-10-16

## 2017-10-16 RX ORDER — METOCLOPRAMIDE 10 MG/1
10 TABLET ORAL
Status: DISCONTINUED | OUTPATIENT
Start: 2017-10-16 | End: 2017-10-30

## 2017-10-16 RX ADMIN — PHENAZOPYRIDINE HYDROCHLORIDE 100 MG: 100 TABLET ORAL at 16:26

## 2017-10-16 RX ADMIN — METOPROLOL TARTRATE 100 MG: 100 TABLET, FILM COATED ORAL at 09:34

## 2017-10-16 RX ADMIN — OXYCODONE HYDROCHLORIDE 10 MG: 10 TABLET ORAL at 23:58

## 2017-10-16 RX ADMIN — PHENAZOPYRIDINE HYDROCHLORIDE 100 MG: 100 TABLET ORAL at 10:35

## 2017-10-16 RX ADMIN — SODIUM CHLORIDE: 9 INJECTION, SOLUTION INTRAVENOUS at 08:26

## 2017-10-16 RX ADMIN — ACETAMINOPHEN 650 MG: 325 TABLET, FILM COATED ORAL at 16:18

## 2017-10-16 RX ADMIN — DEXTROSE MONOHYDRATE 25 ML: 25 INJECTION, SOLUTION INTRAVENOUS at 06:28

## 2017-10-16 RX ADMIN — METOPROLOL TARTRATE 100 MG: 100 TABLET, FILM COATED ORAL at 22:02

## 2017-10-16 RX ADMIN — FAMOTIDINE 20 MG: 10 INJECTION, SOLUTION INTRAVENOUS at 22:02

## 2017-10-16 RX ADMIN — ENALAPRIL MALEATE 20 MG: 10 TABLET ORAL at 22:03

## 2017-10-16 RX ADMIN — MORPHINE SULFATE 15 MG: 15 TABLET, FILM COATED, EXTENDED RELEASE ORAL at 22:02

## 2017-10-16 RX ADMIN — METOCLOPRAMIDE 10 MG: 10 TABLET ORAL at 10:35

## 2017-10-16 RX ADMIN — OXYCODONE HYDROCHLORIDE 10 MG: 10 TABLET ORAL at 16:18

## 2017-10-16 RX ADMIN — SODIUM CHLORIDE: 9 INJECTION, SOLUTION INTRAVENOUS at 02:33

## 2017-10-16 RX ADMIN — CEFTRIAXONE 2 G: 2 INJECTION, SOLUTION INTRAVENOUS at 08:43

## 2017-10-16 RX ADMIN — OXYCODONE HYDROCHLORIDE 10 MG: 10 TABLET ORAL at 08:26

## 2017-10-16 RX ADMIN — ONDANSETRON 4 MG: 4 TABLET, ORALLY DISINTEGRATING ORAL at 16:18

## 2017-10-16 RX ADMIN — ENOXAPARIN SODIUM 40 MG: 100 INJECTION SUBCUTANEOUS at 08:26

## 2017-10-16 RX ADMIN — STANDARDIZED SENNA CONCENTRATE AND DOCUSATE SODIUM 2 TABLET: 8.6; 5 TABLET, FILM COATED ORAL at 22:02

## 2017-10-16 RX ADMIN — OXYCODONE HYDROCHLORIDE 10 MG: 10 TABLET ORAL at 12:04

## 2017-10-16 RX ADMIN — FAMOTIDINE 20 MG: 10 INJECTION, SOLUTION INTRAVENOUS at 08:26

## 2017-10-16 RX ADMIN — STANDARDIZED SENNA CONCENTRATE AND DOCUSATE SODIUM 2 TABLET: 8.6; 5 TABLET, FILM COATED ORAL at 08:25

## 2017-10-16 RX ADMIN — ACETAMINOPHEN 650 MG: 325 TABLET, FILM COATED ORAL at 08:25

## 2017-10-16 RX ADMIN — OXYCODONE HYDROCHLORIDE 10 MG: 10 TABLET ORAL at 00:20

## 2017-10-16 RX ADMIN — ONDANSETRON 4 MG: 2 INJECTION INTRAMUSCULAR; INTRAVENOUS at 06:11

## 2017-10-16 RX ADMIN — MORPHINE SULFATE 15 MG: 15 TABLET, FILM COATED, EXTENDED RELEASE ORAL at 05:41

## 2017-10-16 RX ADMIN — MORPHINE SULFATE 15 MG: 15 TABLET, FILM COATED, EXTENDED RELEASE ORAL at 12:04

## 2017-10-16 RX ADMIN — POTASSIUM CHLORIDE, DEXTROSE MONOHYDRATE AND SODIUM CHLORIDE: 150; 5; 900 INJECTION, SOLUTION INTRAVENOUS at 22:04

## 2017-10-16 RX ADMIN — PHENAZOPYRIDINE HYDROCHLORIDE 100 MG: 100 TABLET ORAL at 08:44

## 2017-10-16 RX ADMIN — ONDANSETRON 4 MG: 4 TABLET, ORALLY DISINTEGRATING ORAL at 22:02

## 2017-10-16 RX ADMIN — METOCLOPRAMIDE 10 MG: 10 TABLET ORAL at 16:18

## 2017-10-16 RX ADMIN — ENALAPRIL MALEATE 20 MG: 10 TABLET ORAL at 09:34

## 2017-10-16 RX ADMIN — POTASSIUM CHLORIDE, DEXTROSE MONOHYDRATE AND SODIUM CHLORIDE: 150; 5; 900 INJECTION, SOLUTION INTRAVENOUS at 10:35

## 2017-10-16 RX ADMIN — LABETALOL HYDROCHLORIDE 10 MG: 5 INJECTION, SOLUTION INTRAVENOUS at 08:35

## 2017-10-16 ASSESSMENT — ENCOUNTER SYMPTOMS
ABDOMINAL PAIN: 1
CHILLS: 0
STRIDOR: 0
HEADACHES: 0
DEPRESSION: 0
FALLS: 0
SHORTNESS OF BREATH: 0
CONSTIPATION: 0
VOMITING: 0
FEVER: 0
NAUSEA: 1
PALPITATIONS: 0
SPUTUM PRODUCTION: 0
MYALGIAS: 0
DIZZINESS: 0
LOSS OF CONSCIOUSNESS: 0

## 2017-10-16 ASSESSMENT — PAIN SCALES - GENERAL
PAINLEVEL_OUTOF10: 4
PAINLEVEL_OUTOF10: 4
PAINLEVEL_OUTOF10: 6
PAINLEVEL_OUTOF10: 4
PAINLEVEL_OUTOF10: 5
PAINLEVEL_OUTOF10: 6
PAINLEVEL_OUTOF10: 10
PAINLEVEL_OUTOF10: 6
PAINLEVEL_OUTOF10: 6
PAINLEVEL_OUTOF10: 5

## 2017-10-16 NOTE — PROGRESS NOTES
FSBG 58, juice and crackers given. Nausea resulting, green emesis +350cc. D50 25ml administered per protocol. Will reassess FSBG in 15 minutes.    Second FSBG 98. Pt states zofran was effective, no nausea present.

## 2017-10-16 NOTE — CARE PLAN
Problem: Pain Management  Goal: Pain level will decrease to patient's comfort goal  Outcome: PROGRESSING SLOWER THAN EXPECTED  Patient has chronic pain.  Receiving  prns and has scheduled meds.

## 2017-10-16 NOTE — PALLIATIVE CARE
"PALLIATIVE CARE FOLLOW UP:  Circled back to assist patient with AD. She reports she has not completed or talked with family \"it's been rough.\" She reports she hasn't been feeling well and is also likely to discharge today. I offered to assist completion of AD but she declined. Again discussed completion out patient. Ensured she had my business card. Patient encouraged to call with any questions, needs or concerns.     Thank you for allowing Palliative Care to follow this patient. Please contact us at  with any questions.   "

## 2017-10-16 NOTE — PROGRESS NOTES
[facility] Hospitalist Progress Note    Date of Service: 10/16/2017    Chief Complaint  62 y.o. female admitted 10/12/2017 with abdominal pain.    Interval Problem Update  Patient began having generalized abdominal pain 2 days prior to admission.  Pain continued to worsen so she presented to the emergency department.  Patient also complained of nausea and vomiting as well as constipation.  Patient was noted to have a small bowel obstruction, did have an NG placed to suction.  Patient does have a small bowel follow-through 10/13, no sign of small bowel obstruction.  Patient does have a history of breast cancer, anal and rectal cancer with liver metastasis as well as cervical cancer.  Patient is in a study for medication for her cancers, I discussed this with an nurse from that study. Patient has been receiving a medication which can cause some abdominal cramping however has been off recently.  Patient did began having bowel movements, pain is improved.  Discussed plan patient edition with nurse at bedside    Consultants/Specialty  Surgery-Dr. Doll    Disposition  Patient requires additional treatment in the hospital        Review of Systems   Constitutional: Negative for chills, fever and malaise/fatigue.   HENT: Negative for congestion.    Respiratory: Negative for sputum production, shortness of breath and stridor.    Cardiovascular: Negative for chest pain and palpitations.   Gastrointestinal: Positive for abdominal pain and nausea. Negative for constipation and vomiting.   Genitourinary: Negative for dysuria, hematuria and urgency.   Musculoskeletal: Positive for joint pain. Negative for falls and myalgias.   Neurological: Negative for dizziness, loss of consciousness and headaches.   Psychiatric/Behavioral: Negative for depression and suicidal ideas.   All other systems reviewed and are negative.     Physical Exam  Laboratory/Imaging   Hemodynamics  Temp (24hrs), Av.6 °C (97.8 °F), Min:36.2 °C (97.1 °F),  Max:36.8 °C (98.3 °F)   Temperature: 36.8 °C (98.3 °F)  Pulse  Av.4  Min: 75  Max: 142   Blood Pressure: 131/61      Respiratory      Respiration: 17, Pulse Oximetry: 96 %             Fluids    Intake/Output Summary (Last 24 hours) at 10/16/17 0717  Last data filed at 10/15/17 1600   Gross per 24 hour   Intake              360 ml   Output                0 ml   Net              360 ml       Nutrition  Orders Placed This Encounter   Procedures   • DIET ORDER     Standing Status:   Standing     Number of Occurrences:   1     Order Specific Question:   Diet:     Answer:   Regular [1]     Physical Exam   Constitutional: She is oriented to person, place, and time. No distress.   HENT:   Head: Normocephalic and atraumatic.   Mouth/Throat: No oropharyngeal exudate.   Eyes: Right eye exhibits no discharge. Left eye exhibits no discharge. No scleral icterus.   Neck: Neck supple. No tracheal deviation present.   Cardiovascular: Normal rate and regular rhythm.    Murmur heard.  Pulmonary/Chest: Effort normal and breath sounds normal. No stridor. No respiratory distress. She has no rales. She exhibits no tenderness.   Abdominal: Soft. Bowel sounds are normal. There is tenderness. There is no rebound and no guarding.   Musculoskeletal: She exhibits no edema.   Neurological: She is alert and oriented to person, place, and time.   Skin: Skin is warm and dry. She is not diaphoretic. No erythema.   Psychiatric: She is slowed.   Odd affect    Nursing note and vitals reviewed.      Recent Labs      10/15/17   0930   WBC  8.6   RBC  3.07*   HEMOGLOBIN  9.1*   HEMATOCRIT  28.1*   MCV  91.5   MCH  29.6   MCHC  32.4*   RDW  51.6*   PLATELETCT  355   MPV  9.0     Recent Labs      10/14/17   0344  10/15/17   0930  10/16/17   0605   SODIUM  140  135  136   POTASSIUM  3.1*  3.5*  3.4*   CHLORIDE  105  103  102   CO2  23  27  26   GLUCOSE  95  74  58*   BUN  6*  5*  5*   CREATININE  0.42*  0.47*  0.48*   CALCIUM  7.8*  7.4*  7.9*                       Assessment/Plan     Anal carcinoma (CMS-HCC)- (present on admission)   Assessment & Plan    - Chronic, palliative care to see, outpatient follow-up        Chest pain   Assessment & Plan    - GI in nature, resolved with GI cocktail        Sepsis (CMS-HCC)   Assessment & Plan    - This is sepsis (without associated acute organ dysfunction).   - Due to urinary tract infection, improving        Hyponatremia- (present on admission)   Assessment & Plan    - Resolved        Normocytic anemia- (present on admission)   Assessment & Plan    - Chronic, no sign of gross bleeding        SBO (small bowel obstruction)- (present on admission)   Assessment & Plan    -Small bowel follow-through 10/13 with no obstruction, NG removed, patient tolerating regular diet  - Bowel sounds improved  -Additional recommendations per surgery  - Patient is still having abdominal pain, does have chronic abdominal pain, now also having acute lower quadrant pain, I do not feel any of her current abdominal pain is due to small bowel obstruction          Rectal cancer (CMS-HCC)- (present on admission)   Assessment & Plan    - Patient is on experimental drug as an outpatient, continue outpatient follow-up  - This medication can cause pain, had been weaned down per nurse who works in the study        Hypokalemia- (present on admission)   Assessment & Plan    - Improved, continue oral potassium   - Repeat BMP in the morning        UTI (urinary tract infection)- (present on admission)   Assessment & Plan    - Continue Rocephin  - Causing abdominal pain, add Pyridium            Reviewed items::  Labs reviewed, Medications reviewed and Radiology images reviewed  DVT prophylaxis pharmacological::  Enoxaparin (Lovenox)  Antibiotics:  Treating active infection/contamination beyond 24 hours perioperative coverage

## 2017-10-16 NOTE — WOUND TEAM
"[facility]  Inpatient Services  Initial Wound and Skin Care Evaluation    Admission Date:  10/12/17  HPI, PMH, SH: Reviewed  Unit where seen by Wound Team:  [id number]    WOUND CONSULT RELATED TO: evaluation of sacral wound    SUBJECTIVE:  \"It's alittle sore\"    Self Report / Pain Level: denies    OBJECTIVE:    mepilex sacral dressing intact with some stool at distal edge of dressing    WOUND TYPE, LOCATION, CHARACTERISTICS (Pressure ulcers: location, stage, POA or date identified)    Wound Type/Location:  Partial thickness wound due to moisture sacrum  Periwound:  intact    Drainage:  none     Tissue Type and %:  Pink 100%    Wound Edges:  attached    Odor:  none     Exposed structure(s):  none   S&S of Infection:  none    Measurements:  (taken 10/16/17)    Length:  2cm   Width:  1.3cm   Depth:  <0.5cm   Tracts/undermining: none      INTERVENTIONS BY WOUND TEAM:  Patient turned self to side and removed mepilex sacral dressing.  Measurements taken.  Replaced with adhesive foam dressing as smaller and will possible remain unsoiled longer.  Discussed with staff RN.    Interdisciplinary consultation: staff RN, patient    EVALUATION:   Partial thickness wound due to moisture which could become pressure ulcer without dressing and patient lying side to side.    Factors affecting wound healing: pressure, moisture  Goals: wound to decrease in size by 2% weekly    NURSING PLAN OF CARE ORDERS (X):    Dressing changes: See Dressing Maintenance orders:  X  Skin care: See Skin Care orders:   Rectal tube care: See Rectal Tube Care orders:    Other orders:    RSKIN: CURRENT (X) ORDERED (O)  Q shift Joe:  X  Q shift pressure point assessments:  X  Atmosair   X      HARRIS       Bariatric HARRIS       Bariatric foam         Heel float boots        Heels floated on pillows  independent with bed mobility     Barrier wipes       Barrier Cream       Barrier paste    Sacral silicone dressing      Padded O2 tubing     "   Anchorfast       Trach with Optifoam split foam        Waffle cushion       Rectal tube or BMS       Antifungal tx    Turn q 2 hours  see above  Up to chair    X  Ambulate    PT/OT      Dietician      PO  X   TF   TPN     PVN    NPO   # days    Other       WOUND TEAM PLAN OF CARE (X):    NPWT change 3 x week:         Dressing changes by wound team:       Follow up as needed:   X     Other (explain):    Anticipated discharge plans (X):  SNF:           Home Care:           Outpatient Wound Center:            Self Care:            Other:   TBD

## 2017-10-16 NOTE — PROGRESS NOTES
A/Ox4, VSS.  Pt states moderate abdominal pain, medicated per MAR.  Abdomen soft, tender.  Denies n/v, -flatus, +hypoactive BSx4.  RA, 1L while sleeping, satting >90%.   Regular diet, tolerates fine.   ACHS.  Up with hand held assist.  +void in BR.  Call light within reach, calls appropriately.  Bed in low and locked position.

## 2017-10-17 ENCOUNTER — APPOINTMENT (OUTPATIENT)
Dept: RADIOLOGY | Facility: MEDICAL CENTER | Age: 62
DRG: 854 | End: 2017-10-17
Attending: INTERNAL MEDICINE
Payer: MEDICAID

## 2017-10-17 LAB
BACTERIA BLD CULT: NORMAL
BACTERIA BLD CULT: NORMAL
GLUCOSE BLD-MCNC: 120 MG/DL (ref 65–99)
GLUCOSE BLD-MCNC: 131 MG/DL (ref 65–99)
MAGNESIUM SERPL-MCNC: 1.6 MG/DL (ref 1.5–2.5)
SIGNIFICANT IND 70042: NORMAL
SIGNIFICANT IND 70042: NORMAL
SITE SITE: NORMAL
SITE SITE: NORMAL
SOURCE SOURCE: NORMAL
SOURCE SOURCE: NORMAL

## 2017-10-17 PROCEDURE — 700102 HCHG RX REV CODE 250 W/ 637 OVERRIDE(OP): Performed by: INTERNAL MEDICINE

## 2017-10-17 PROCEDURE — 82962 GLUCOSE BLOOD TEST: CPT | Mod: 91

## 2017-10-17 PROCEDURE — 700101 HCHG RX REV CODE 250: Performed by: INTERNAL MEDICINE

## 2017-10-17 PROCEDURE — A9270 NON-COVERED ITEM OR SERVICE: HCPCS | Performed by: INTERNAL MEDICINE

## 2017-10-17 PROCEDURE — 770020 HCHG ROOM/CARE - TELE (206)

## 2017-10-17 PROCEDURE — 700111 HCHG RX REV CODE 636 W/ 250 OVERRIDE (IP): Performed by: INTERNAL MEDICINE

## 2017-10-17 PROCEDURE — 83735 ASSAY OF MAGNESIUM: CPT

## 2017-10-17 PROCEDURE — 74000 DX-ABDOMEN-1 VIEW: CPT

## 2017-10-17 PROCEDURE — 99232 SBSQ HOSP IP/OBS MODERATE 35: CPT | Performed by: INTERNAL MEDICINE

## 2017-10-17 PROCEDURE — 700111 HCHG RX REV CODE 636 W/ 250 OVERRIDE (IP): Performed by: SURGERY

## 2017-10-17 RX ADMIN — CEFTRIAXONE 2 G: 2 INJECTION, SOLUTION INTRAVENOUS at 09:10

## 2017-10-17 RX ADMIN — ANTACID TABLETS 500 MG: 500 TABLET, CHEWABLE ORAL at 09:29

## 2017-10-17 RX ADMIN — STANDARDIZED SENNA CONCENTRATE AND DOCUSATE SODIUM 2 TABLET: 8.6; 5 TABLET, FILM COATED ORAL at 11:33

## 2017-10-17 RX ADMIN — MORPHINE SULFATE 15 MG: 15 TABLET, FILM COATED, EXTENDED RELEASE ORAL at 06:03

## 2017-10-17 RX ADMIN — POTASSIUM CHLORIDE, DEXTROSE MONOHYDRATE AND SODIUM CHLORIDE: 150; 5; 900 INJECTION, SOLUTION INTRAVENOUS at 09:13

## 2017-10-17 RX ADMIN — METOCLOPRAMIDE 10 MG: 10 TABLET ORAL at 17:42

## 2017-10-17 RX ADMIN — PHENAZOPYRIDINE HYDROCHLORIDE 100 MG: 100 TABLET ORAL at 17:43

## 2017-10-17 RX ADMIN — METOCLOPRAMIDE 10 MG: 10 TABLET ORAL at 11:33

## 2017-10-17 RX ADMIN — MORPHINE SULFATE 15 MG: 15 TABLET, FILM COATED, EXTENDED RELEASE ORAL at 14:48

## 2017-10-17 RX ADMIN — PHENAZOPYRIDINE HYDROCHLORIDE 100 MG: 100 TABLET ORAL at 11:32

## 2017-10-17 RX ADMIN — MORPHINE SULFATE 15 MG: 15 TABLET, FILM COATED, EXTENDED RELEASE ORAL at 22:16

## 2017-10-17 RX ADMIN — OXYCODONE HYDROCHLORIDE 5 MG: 5 TABLET ORAL at 20:13

## 2017-10-17 RX ADMIN — ENALAPRIL MALEATE 20 MG: 10 TABLET ORAL at 11:32

## 2017-10-17 RX ADMIN — ENOXAPARIN SODIUM 40 MG: 100 INJECTION SUBCUTANEOUS at 09:11

## 2017-10-17 RX ADMIN — FAMOTIDINE 20 MG: 10 INJECTION, SOLUTION INTRAVENOUS at 20:12

## 2017-10-17 RX ADMIN — METOPROLOL TARTRATE 100 MG: 100 TABLET, FILM COATED ORAL at 11:33

## 2017-10-17 RX ADMIN — ENALAPRIL MALEATE 20 MG: 10 TABLET ORAL at 20:12

## 2017-10-17 RX ADMIN — ACETAMINOPHEN 650 MG: 325 TABLET, FILM COATED ORAL at 17:42

## 2017-10-17 RX ADMIN — POTASSIUM CHLORIDE, DEXTROSE MONOHYDRATE AND SODIUM CHLORIDE: 150; 5; 900 INJECTION, SOLUTION INTRAVENOUS at 20:14

## 2017-10-17 RX ADMIN — ONDANSETRON 4 MG: 4 TABLET, ORALLY DISINTEGRATING ORAL at 09:06

## 2017-10-17 RX ADMIN — ONDANSETRON 4 MG: 2 INJECTION INTRAMUSCULAR; INTRAVENOUS at 22:16

## 2017-10-17 RX ADMIN — ONDANSETRON 4 MG: 4 TABLET, ORALLY DISINTEGRATING ORAL at 14:54

## 2017-10-17 RX ADMIN — METOCLOPRAMIDE 10 MG: 10 TABLET ORAL at 06:03

## 2017-10-17 RX ADMIN — METOPROLOL TARTRATE 100 MG: 100 TABLET, FILM COATED ORAL at 20:12

## 2017-10-17 RX ADMIN — FAMOTIDINE 20 MG: 10 INJECTION, SOLUTION INTRAVENOUS at 09:11

## 2017-10-17 RX ADMIN — OXYCODONE HYDROCHLORIDE 10 MG: 10 TABLET ORAL at 11:33

## 2017-10-17 RX ADMIN — ACETAMINOPHEN 650 MG: 325 TABLET, FILM COATED ORAL at 11:32

## 2017-10-17 RX ADMIN — STANDARDIZED SENNA CONCENTRATE AND DOCUSATE SODIUM 2 TABLET: 8.6; 5 TABLET, FILM COATED ORAL at 20:13

## 2017-10-17 ASSESSMENT — ENCOUNTER SYMPTOMS
MYALGIAS: 0
SPUTUM PRODUCTION: 0
CONSTIPATION: 0
FALLS: 0
DIZZINESS: 0
SHORTNESS OF BREATH: 0
FEVER: 0
STRIDOR: 0
COUGH: 0
LOSS OF CONSCIOUSNESS: 0
CHILLS: 0
VOMITING: 1
HEADACHES: 0
PALPITATIONS: 0
DEPRESSION: 0
NAUSEA: 1
ABDOMINAL PAIN: 1

## 2017-10-17 ASSESSMENT — PAIN SCALES - GENERAL
PAINLEVEL_OUTOF10: 8
PAINLEVEL_OUTOF10: 2
PAINLEVEL_OUTOF10: 0
PAINLEVEL_OUTOF10: 5
PAINLEVEL_OUTOF10: 0
PAINLEVEL_OUTOF10: 5

## 2017-10-17 NOTE — PALLIATIVE CARE
Palliative Care follow-up  PC RN visited pt at bedside, discussed AD, pt requested to look at packet before she compeltes, she thinks her daughter has the other copy but would like another one. PC RN provided packet and contact card.           Plan:   Will follow up with pt regarding AD packet.    Thank you for allowing Palliative Care to participate in this patient's care. Please feel free to call [phone] with any questions or concerns.

## 2017-10-17 NOTE — PROGRESS NOTES
A/Ox4, VSS.  Pt states moderate abdominal pain, medicated per MAR.  Abdomen soft, tender.  Some nausea denying intervention, -flatus, +hypoactive BSx4.  0.5L , satting >90%, attempting to wean this shift.  Tele monitored, monitor room confirmed reading.   Regular diet, tolerates fine.   ACHS.  Up with hand held assist.  +void in BR.  Call light within reach, calls appropriately.  Bed in low and locked position.

## 2017-10-17 NOTE — CARE PLAN
Problem: Bowel/Gastric:  Goal: Normal bowel function is maintained or improved  Outcome: PROGRESSING SLOWER THAN EXPECTED  Patient taking senna, very little oral intake.    Problem: Pain Management  Goal: Pain level will decrease to patient's comfort goal  Outcome: PROGRESSING AS EXPECTED  Patient taking oral analgesics with adequate pain relief.

## 2017-10-17 NOTE — PROGRESS NOTES
[facility] Hospitalist Progress Note    Date of Service: 10/17/2017    Chief Complaint  62 y.o. female admitted 10/12/2017 with abdominal pain.    Interval Problem Update  Complaint about abdominal distension and vomiting today. But per RN it's still the same from yesterday.  Discussed plan patient edition with nurse at bedside    Consultants/Specialty  Surgery-Dr. Doll    Disposition  Patient requires additional treatment in the hospital        Review of Systems   Constitutional: Negative for chills, fever and malaise/fatigue.   HENT: Negative for congestion.    Respiratory: Negative for cough, sputum production, shortness of breath and stridor.    Cardiovascular: Negative for chest pain and palpitations.   Gastrointestinal: Positive for abdominal pain, nausea and vomiting. Negative for constipation.   Genitourinary: Negative for dysuria and urgency.   Musculoskeletal: Positive for joint pain. Negative for falls and myalgias.   Neurological: Negative for dizziness, loss of consciousness and headaches.   Psychiatric/Behavioral: Negative for depression and suicidal ideas.      Physical Exam  Laboratory/Imaging   Hemodynamics  Temp (24hrs), Av.6 °C (97.8 °F), Min:36.3 °C (97.3 °F), Max:37.1 °C (98.7 °F)   Temperature: 36.6 °C (97.8 °F)  Pulse  Av  Min: 75  Max: 142   Blood Pressure: 144/81      Respiratory      Respiration: 18, Pulse Oximetry: 97 %             Fluids    Intake/Output Summary (Last 24 hours) at 10/17/17 1433  Last data filed at 10/17/17 1300   Gross per 24 hour   Intake             1716 ml   Output                0 ml   Net             1716 ml       Nutrition  Orders Placed This Encounter   Procedures   • DIET ORDER     Standing Status:   Standing     Number of Occurrences:   1     Order Specific Question:   Diet:     Answer:   Regular [1]     Physical Exam   Constitutional: She is oriented to person, place, and time. No distress.   HENT:   Head: Normocephalic and atraumatic.   Mouth/Throat: No  oropharyngeal exudate.   Eyes: Right eye exhibits no discharge. Left eye exhibits no discharge. No scleral icterus.   Neck: Neck supple. No tracheal deviation present. No thyromegaly present.   Cardiovascular: Normal rate and regular rhythm.    Murmur heard.  Pulmonary/Chest: Effort normal and breath sounds normal. No stridor. No respiratory distress. She has no rales.   Abdominal: Soft. Bowel sounds are normal. There is tenderness. There is no rebound.   Musculoskeletal: She exhibits no edema.   Neurological: She is alert and oriented to person, place, and time. No cranial nerve deficit.   Skin: Skin is warm and dry. She is not diaphoretic. No erythema.   Psychiatric: She is slowed.   Odd affect    Nursing note and vitals reviewed.      Recent Labs      10/15/17   0930   WBC  8.6   RBC  3.07*   HEMOGLOBIN  9.1*   HEMATOCRIT  28.1*   MCV  91.5   MCH  29.6   MCHC  32.4*   RDW  51.6*   PLATELETCT  355   MPV  9.0     Recent Labs      10/15/17   0930  10/16/17   0605   SODIUM  135  136   POTASSIUM  3.5*  3.4*   CHLORIDE  103  102   CO2  27  26   GLUCOSE  74  58*   BUN  5*  5*   CREATININE  0.47*  0.48*   CALCIUM  7.4*  7.9*                      Assessment/Plan     Anal carcinoma (CMS-HCC)- (present on admission)   Assessment & Plan    - Chronic, palliative care to see, outpatient follow-up        Chest pain   Assessment & Plan    - GI in nature, resolved with GI cocktail        Sepsis (CMS-[facility])- (present on admission)   Assessment & Plan    - This is sepsis (without associated acute organ dysfunction).   - Due to urinary tract infection  - resolved        Hyponatremia- (present on admission)   Assessment & Plan    - Resolved        Normocytic anemia- (present on admission)   Assessment & Plan    - Chronic, no sign of gross bleeding        SBO (small bowel obstruction)- (present on admission)   Assessment & Plan    - Small bowel follow-through 10/13 with no obstruction, NG removed, patient tolerating regular diet  -  Bowel sounds improved  - Additional recommendations per surgery  - Patient is still having abdominal pain, does have chronic abdominal pain, now also having acute lower quadrant pain, I do not feel any of her current abdominal pain is due to small bowel obstruction          Rectal cancer (CMS-HCC)- (present on admission)   Assessment & Plan    - Patient is on experimental drug as an outpatient, continue outpatient follow-up  - This medication can cause pain, had been weaned down per nurse who works in the study        Hypokalemia- (present on admission)   Assessment & Plan    - Improved, continue oral potassium   - Repeat BMP in the morning        UTI (urinary tract infection)- (present on admission)   Assessment & Plan    - completed abx, culture: negative            Reviewed items::  Labs reviewed, Medications reviewed and Radiology images reviewed  DVT prophylaxis pharmacological::  Enoxaparin (Lovenox)  Antibiotics:  Treating active infection/contamination beyond 24 hours perioperative coverage

## 2017-10-17 NOTE — PALLIATIVE CARE
Spiritual Care Note           Patient's Name: Marilyn Strange   MRN: [id number]    Age and Gender: 62 y.o. female   YOB: 1955   Place of Residence: [place], [place]   Unit: General Surgery   Room (and Bed): [facility] 431   Bed 1   Ethnicity or Nationality:    Medical Diagnosis(-es)/Procedure(s): anal carcinoma   chest pain, GI in nature, resolved  sepsis, due to urinary tract infection, resolved  hyponatremia, resolved  normocytic anemia, chronic  small bowel obstruction  rectal cancer   hypokalemia, improved  urinary tract infection   Yarsani Affiliation: Confucianist   Code Status: Full Code    Date of Admission: 10/12/2017    Length of Stay: 5 days   Date of Visit: 10/17/2017       Situation/Reason for Visit:  Patient followed by palliative care.  Patient is also well-known to this  because she regularly attends [facility] cancer support group.    Background:  see above diagnoses    Receptivity to Visit:  Patient indicated to Casi Goldstein, palliative care RN, that she would like a spiritual care visit.    Observations:  Patient partially sitting up in bed, alert, pleasant.    Summary of Interaction/Conversation with Patient and/or Family/Friends/Others:  Patient talked about her uncertainty about what is happening medically (her treatment with a clinical trial was succeeding but a recent CT scan showed cancer, she has a bowel obstruction but her pain may be caused by something else).  She talked about her tracy which is a source of strength for her and the the support of her sisters, children, and the cancer support group.  She said that her tracy is what is getting through this.  She asked me to pray with her.    Assessment of Cultural/Social/Emotional/Spiritual Issues:  Some anxiety, tracy a source of strength, seeking spiritual support through prayer.    Interventions:  Compassionate presence, reflective listening emotional support, prayer.    Outcomes:  Spiritual comfort, renewal of  trust.    Consultations/Referrals:  none    Requests/Recommendations:  none    Continuing Care:  Will continue to follow.      Contact Information:  Chaplain JACKELYN Trejo  [phone]   [email or web address]

## 2017-10-17 NOTE — CARE PLAN
Problem: Nutritional:  Goal: Achieve adequate nutritional intake  Patient will start diet and consume >50% of meals   Outcome: PROGRESSING SLOWER THAN EXPECTED  Per chart, variable Po. Pt reports she typically consumes <50% of meals.  She states her appetite is poor and the food is not appealing to her. Pt states she drinks Ensure at home once a day and is willing to try Boost on her breakfast trays.  We also discussed high protein snacks.

## 2017-10-18 LAB
GLUCOSE BLD-MCNC: 105 MG/DL (ref 65–99)
GLUCOSE BLD-MCNC: 108 MG/DL (ref 65–99)

## 2017-10-18 PROCEDURE — 770020 HCHG ROOM/CARE - TELE (206)

## 2017-10-18 PROCEDURE — 99232 SBSQ HOSP IP/OBS MODERATE 35: CPT | Performed by: INTERNAL MEDICINE

## 2017-10-18 PROCEDURE — 82962 GLUCOSE BLOOD TEST: CPT

## 2017-10-18 PROCEDURE — 700111 HCHG RX REV CODE 636 W/ 250 OVERRIDE (IP): Performed by: INTERNAL MEDICINE

## 2017-10-18 PROCEDURE — 700102 HCHG RX REV CODE 250 W/ 637 OVERRIDE(OP): Performed by: INTERNAL MEDICINE

## 2017-10-18 PROCEDURE — A9270 NON-COVERED ITEM OR SERVICE: HCPCS | Performed by: INTERNAL MEDICINE

## 2017-10-18 PROCEDURE — 700111 HCHG RX REV CODE 636 W/ 250 OVERRIDE (IP): Performed by: SURGERY

## 2017-10-18 PROCEDURE — 700101 HCHG RX REV CODE 250: Performed by: INTERNAL MEDICINE

## 2017-10-18 RX ORDER — ENALAPRILAT 1.25 MG/ML
1.25 INJECTION INTRAVENOUS EVERY 6 HOURS PRN
Status: DISCONTINUED | OUTPATIENT
Start: 2017-10-18 | End: 2017-11-04 | Stop reason: HOSPADM

## 2017-10-18 RX ORDER — ONDANSETRON 4 MG/1
4 TABLET, ORALLY DISINTEGRATING ORAL EVERY 6 HOURS
Status: DISCONTINUED | OUTPATIENT
Start: 2017-10-18 | End: 2017-11-04 | Stop reason: HOSPADM

## 2017-10-18 RX ADMIN — STANDARDIZED SENNA CONCENTRATE AND DOCUSATE SODIUM 2 TABLET: 8.6; 5 TABLET, FILM COATED ORAL at 09:09

## 2017-10-18 RX ADMIN — MORPHINE SULFATE 15 MG: 15 TABLET, FILM COATED, EXTENDED RELEASE ORAL at 22:14

## 2017-10-18 RX ADMIN — ACETAMINOPHEN 650 MG: 325 TABLET, FILM COATED ORAL at 09:10

## 2017-10-18 RX ADMIN — OXYCODONE HYDROCHLORIDE 5 MG: 5 TABLET ORAL at 09:10

## 2017-10-18 RX ADMIN — ACETAMINOPHEN 650 MG: 325 TABLET, FILM COATED ORAL at 16:38

## 2017-10-18 RX ADMIN — METOPROLOL TARTRATE 100 MG: 100 TABLET, FILM COATED ORAL at 20:03

## 2017-10-18 RX ADMIN — METOCLOPRAMIDE 10 MG: 10 TABLET ORAL at 16:38

## 2017-10-18 RX ADMIN — MORPHINE SULFATE 15 MG: 15 TABLET, FILM COATED, EXTENDED RELEASE ORAL at 06:11

## 2017-10-18 RX ADMIN — POLYETHYLENE GLYCOL 3350 1 PACKET: 17 POWDER, FOR SOLUTION ORAL at 09:10

## 2017-10-18 RX ADMIN — LABETALOL HYDROCHLORIDE 10 MG: 5 INJECTION, SOLUTION INTRAVENOUS at 20:29

## 2017-10-18 RX ADMIN — METOPROLOL TARTRATE 100 MG: 100 TABLET, FILM COATED ORAL at 09:09

## 2017-10-18 RX ADMIN — PROMETHAZINE HYDROCHLORIDE 25 MG: 25 TABLET ORAL at 16:38

## 2017-10-18 RX ADMIN — ONDANSETRON 4 MG: 4 TABLET, ORALLY DISINTEGRATING ORAL at 09:10

## 2017-10-18 RX ADMIN — ENOXAPARIN SODIUM 40 MG: 100 INJECTION SUBCUTANEOUS at 09:00

## 2017-10-18 RX ADMIN — METOCLOPRAMIDE 10 MG: 10 TABLET ORAL at 06:11

## 2017-10-18 RX ADMIN — PHENAZOPYRIDINE HYDROCHLORIDE 100 MG: 100 TABLET ORAL at 11:48

## 2017-10-18 RX ADMIN — BISACODYL 10 MG: 10 SUPPOSITORY RECTAL at 20:29

## 2017-10-18 RX ADMIN — STANDARDIZED SENNA CONCENTRATE AND DOCUSATE SODIUM 2 TABLET: 8.6; 5 TABLET, FILM COATED ORAL at 20:03

## 2017-10-18 RX ADMIN — PHENAZOPYRIDINE HYDROCHLORIDE 100 MG: 100 TABLET ORAL at 17:30

## 2017-10-18 RX ADMIN — MORPHINE SULFATE 15 MG: 15 TABLET, FILM COATED, EXTENDED RELEASE ORAL at 15:12

## 2017-10-18 RX ADMIN — ENALAPRIL MALEATE 20 MG: 10 TABLET ORAL at 20:03

## 2017-10-18 RX ADMIN — ENALAPRIL MALEATE 20 MG: 10 TABLET ORAL at 09:09

## 2017-10-18 RX ADMIN — FAMOTIDINE 20 MG: 10 INJECTION, SOLUTION INTRAVENOUS at 09:09

## 2017-10-18 RX ADMIN — OXYCODONE HYDROCHLORIDE 5 MG: 5 TABLET ORAL at 16:38

## 2017-10-18 RX ADMIN — POTASSIUM CHLORIDE, DEXTROSE MONOHYDRATE AND SODIUM CHLORIDE: 150; 5; 900 INJECTION, SOLUTION INTRAVENOUS at 06:17

## 2017-10-18 RX ADMIN — ONDANSETRON 4 MG: 4 TABLET, ORALLY DISINTEGRATING ORAL at 15:12

## 2017-10-18 RX ADMIN — METOCLOPRAMIDE 10 MG: 10 TABLET ORAL at 11:48

## 2017-10-18 RX ADMIN — PHENAZOPYRIDINE HYDROCHLORIDE 100 MG: 100 TABLET ORAL at 09:10

## 2017-10-18 RX ADMIN — MAGNESIUM HYDROXIDE 30 ML: 400 SUSPENSION ORAL at 09:10

## 2017-10-18 RX ADMIN — FAMOTIDINE 20 MG: 10 INJECTION, SOLUTION INTRAVENOUS at 20:03

## 2017-10-18 ASSESSMENT — ENCOUNTER SYMPTOMS
COUGH: 0
VOMITING: 1
FALLS: 0
MYALGIAS: 0
DIZZINESS: 0
DEPRESSION: 0
HEADACHES: 0
PALPITATIONS: 0
SPUTUM PRODUCTION: 0
WEAKNESS: 1
CHILLS: 0
STRIDOR: 0
FEVER: 0
CONSTIPATION: 0
ABDOMINAL PAIN: 1
EYES NEGATIVE: 1
LOSS OF CONSCIOUSNESS: 0
NAUSEA: 1

## 2017-10-18 ASSESSMENT — PAIN SCALES - GENERAL
PAINLEVEL_OUTOF10: 4
PAINLEVEL_OUTOF10: 3
PAINLEVEL_OUTOF10: 6
PAINLEVEL_OUTOF10: 7
PAINLEVEL_OUTOF10: 4

## 2017-10-18 NOTE — PROGRESS NOTES
[facility] Hospitalist Progress Note    Date of Service: 10/18/2017    Chief Complaint  62 y.o. female admitted 10/12/2017 with abdominal pain.    Interval Problem Update  Her abdominal distension is a little better per patient. But still throwing up. KUB showed partial SBO.  Discussed plan patient edition with nurse at bedside    Consultants/Specialty  Surgery-Dr. Doll    Disposition  Patient requires additional treatment in the hospital        Review of Systems   Constitutional: Negative for chills, fever and malaise/fatigue.   HENT: Negative for congestion.    Eyes: Negative.    Respiratory: Negative for cough, sputum production and stridor.    Cardiovascular: Negative for chest pain and palpitations.   Gastrointestinal: Positive for abdominal pain, nausea and vomiting. Negative for constipation.   Genitourinary: Negative for dysuria and urgency.   Musculoskeletal: Positive for joint pain. Negative for falls and myalgias.   Skin: Negative.    Neurological: Positive for weakness. Negative for dizziness, loss of consciousness and headaches.   Psychiatric/Behavioral: Negative for depression and suicidal ideas.      Physical Exam  Laboratory/Imaging   Hemodynamics  Temp (24hrs), Av.4 °C (97.6 °F), Min:36.2 °C (97.1 °F), Max:37.2 °C (98.9 °F)   Temperature: 36.2 °C (97.1 °F)  Pulse  Av.3  Min: 75  Max: 142   Blood Pressure: 121/70      Respiratory      Respiration: 17, Pulse Oximetry: 95 %             Fluids    Intake/Output Summary (Last 24 hours) at 10/18/17 0825  Last data filed at 10/18/17 0400   Gross per 24 hour   Intake             1891 ml   Output                0 ml   Net             1891 ml       Nutrition  Orders Placed This Encounter   Procedures   • DIET ORDER     Standing Status:   Standing     Number of Occurrences:   1     Order Specific Question:   Diet:     Answer:   Regular [1]     Physical Exam   Constitutional: She is oriented to person, place, and time. No distress.   HENT:   Head:  Normocephalic and atraumatic.   Mouth/Throat: No oropharyngeal exudate.   Eyes: Right eye exhibits no discharge. Left eye exhibits no discharge.   Neck: Neck supple. No tracheal deviation present. No thyromegaly present.   Cardiovascular: Normal rate and regular rhythm.    Murmur heard.  Pulmonary/Chest: Effort normal and breath sounds normal. No respiratory distress.   Abdominal: Soft. Bowel sounds are normal. She exhibits distension. There is tenderness. There is no rebound and no guarding.   Musculoskeletal: She exhibits no edema.   Neurological: She is alert and oriented to person, place, and time. No cranial nerve deficit.   Skin: Skin is warm and dry. She is not diaphoretic. No erythema.   Psychiatric: She is slowed.   Nursing note and vitals reviewed.      Recent Labs      10/15/17   0930   WBC  8.6   RBC  3.07*   HEMOGLOBIN  9.1*   HEMATOCRIT  28.1*   MCV  91.5   MCH  29.6   MCHC  32.4*   RDW  51.6*   PLATELETCT  355   MPV  9.0     Recent Labs      10/15/17   0930  10/16/17   0605   SODIUM  135  136   POTASSIUM  3.5*  3.4*   CHLORIDE  103  102   CO2  27  26   GLUCOSE  74  58*   BUN  5*  5*   CREATININE  0.47*  0.48*   CALCIUM  7.4*  7.9*                      Assessment/Plan     SBO (small bowel obstruction)- (present on admission)   Assessment & Plan    - Small bowel follow-through 10/13 with no obstruction, NG removed  - but she continued to have distension and vomiting  - KUB: showed partial SBO  - Additional recommendations per surgery:   - will likely need surgery        Anal carcinoma (CMS-[facility])- (present on admission)   Assessment & Plan    - Chronic, palliative care to see, outpatient follow-up        Chest pain   Assessment & Plan    - GI in nature, resolved with GI cocktail        Sepsis (CMS-[facility])- (present on admission)   Assessment & Plan    - This is sepsis (without associated acute organ dysfunction).   - Due to urinary tract infection  - resolved        Hyponatremia- (present on admission)    Assessment & Plan    - Resolved        Normocytic anemia- (present on admission)   Assessment & Plan    - Chronic, no sign of gross bleeding        Rectal cancer (CMS-HCC)- (present on admission)   Assessment & Plan    - Patient is on experimental drug as an outpatient, continue outpatient follow-up  - This medication can cause pain, had been weaned down per nurse who works in the study        Hypokalemia- (present on admission)   Assessment & Plan    - Improved, continue oral potassium   - Repeat BMP in the morning        UTI (urinary tract infection)- (present on admission)   Assessment & Plan    - completed abx, culture: negative            Reviewed items::  Labs reviewed, Medications reviewed and Radiology images reviewed  DVT prophylaxis pharmacological::  Enoxaparin (Lovenox)  Antibiotics:  Treating active infection/contamination beyond 24 hours perioperative coverage

## 2017-10-18 NOTE — PROGRESS NOTES
Surgery  Pt still with n/v.   Feels better than admission but still in a lot of pain.  KUB shows contrast still in colon.  Abdomen without peritonitis.  Patient appears comfortable.  A/P  1.   Discussed surgical options with patient this morning.  Diagnostic lap (robotic or lap) with lysis of adhesions if possible and probable ostomy upstream from obstruction.    Possible laparotomy.  2.   Discussed non surgical options of watchful waiting, ngt decompression.    3.   Pt would like to think this over.   Will revisit with patient tomorrow.

## 2017-10-18 NOTE — PROGRESS NOTES
Surgery    Called and spoke with daughter regarding surgical versus non surgical treatment.   She appeared to understand and appreciate the consultation.   NGT can be placed for further nausea or vomiting.      Consider Picc and TPN.    Consider GI consult for sigmoidoscopy to evaluate for distal obstruction, contrast reaches colon.

## 2017-10-18 NOTE — PALLIATIVE CARE
PALLIATIVE CARE FOLLOW UP:  Confirmed patient care facilitator/jorge Oliver received certificate of competency/AD copy. She confirmed but reported that patient was sleeping earlier and she would Inaja back - appreciated.     Updated: CR Brady.    Thank you for allowing Palliative Care to follow this patient. Please contact us at  with any questions.

## 2017-10-18 NOTE — PALLIATIVE CARE
"PALLIATIVE CARE FOLLOW UP:  Circled back regarding AD. Patient reports her daughter has the partially completed copy. Offered to assist patient with completion as I documented all wishes in an EPIC note. She expressed \"I would like to get it done.\" She would like to name her sister Madeleine Foote as her primary DPOA-HC and her daughter Nicole Tenorio as her first alternate. Directives as indicated prior. Explained document would require notarization and explained process. Patient encouraged to call with any questions, needs or concerns.     Obtained certificate of competency from Dr. You and faxed to Umm Oliver patient care facilitatory/notary at #[phone]; also provided copy of AD/cert directly.     Thank you for allowing Palliative Care to follow this patient. Please contact us at  with any questions.   "

## 2017-10-18 NOTE — PROGRESS NOTES
Patient alert and oriented x 4. Generalized edema persists. Patient complained of pain. Medicated as ordered. Call light within reach.

## 2017-10-18 NOTE — PROGRESS NOTES
"Patient asked me to speak with daughter.  Daughter had many questions asking if her mother is getting a colostomy bag \"when this afternoon is the doctor coming to see her\".  I tried to explain as best as possible the plan of care. Patient requesting to talk with MD.  Will notify hopitalist to please call at his convience  "

## 2017-10-18 NOTE — CARE PLAN
Problem: Bowel/Gastric:  Goal: Normal bowel function is maintained or improved  Outcome: PROGRESSING SLOWER THAN EXPECTED  Patient still suffering from nausea.    Problem: Pain Management  Goal: Pain level will decrease to patient's comfort goal  Outcome: PROGRESSING AS EXPECTED  Patient needing less pain medication.

## 2017-10-19 ENCOUNTER — APPOINTMENT (OUTPATIENT)
Dept: RADIOLOGY | Facility: MEDICAL CENTER | Age: 62
DRG: 854 | End: 2017-10-19
Attending: SURGERY
Payer: MEDICAID

## 2017-10-19 PROCEDURE — 700111 HCHG RX REV CODE 636 W/ 250 OVERRIDE (IP): Performed by: INTERNAL MEDICINE

## 2017-10-19 PROCEDURE — 700101 HCHG RX REV CODE 250: Performed by: INTERNAL MEDICINE

## 2017-10-19 PROCEDURE — 74000 DX-ABDOMEN-1 VIEW: CPT

## 2017-10-19 PROCEDURE — A9270 NON-COVERED ITEM OR SERVICE: HCPCS | Performed by: INTERNAL MEDICINE

## 2017-10-19 PROCEDURE — 700102 HCHG RX REV CODE 250 W/ 637 OVERRIDE(OP): Performed by: INTERNAL MEDICINE

## 2017-10-19 PROCEDURE — 770020 HCHG ROOM/CARE - TELE (206)

## 2017-10-19 PROCEDURE — 700111 HCHG RX REV CODE 636 W/ 250 OVERRIDE (IP): Performed by: SURGERY

## 2017-10-19 PROCEDURE — 99232 SBSQ HOSP IP/OBS MODERATE 35: CPT | Performed by: INTERNAL MEDICINE

## 2017-10-19 RX ORDER — MORPHINE SULFATE 4 MG/ML
4 INJECTION, SOLUTION INTRAMUSCULAR; INTRAVENOUS EVERY 4 HOURS PRN
Status: DISCONTINUED | OUTPATIENT
Start: 2017-10-19 | End: 2017-10-22

## 2017-10-19 RX ORDER — METOPROLOL TARTRATE 1 MG/ML
5 INJECTION, SOLUTION INTRAVENOUS EVERY 4 HOURS PRN
Status: DISCONTINUED | OUTPATIENT
Start: 2017-10-19 | End: 2017-10-29

## 2017-10-19 RX ADMIN — MORPHINE SULFATE 4 MG: 4 INJECTION INTRAVENOUS at 13:51

## 2017-10-19 RX ADMIN — FAMOTIDINE 20 MG: 10 INJECTION, SOLUTION INTRAVENOUS at 08:22

## 2017-10-19 RX ADMIN — MORPHINE SULFATE 4 MG: 4 INJECTION INTRAVENOUS at 20:50

## 2017-10-19 RX ADMIN — POTASSIUM CHLORIDE, DEXTROSE MONOHYDRATE AND SODIUM CHLORIDE: 150; 5; 900 INJECTION, SOLUTION INTRAVENOUS at 01:39

## 2017-10-19 RX ADMIN — ONDANSETRON 4 MG: 4 TABLET, ORALLY DISINTEGRATING ORAL at 04:56

## 2017-10-19 RX ADMIN — MORPHINE SULFATE 15 MG: 15 TABLET, FILM COATED, EXTENDED RELEASE ORAL at 04:55

## 2017-10-19 RX ADMIN — PROMETHAZINE HYDROCHLORIDE 25 MG: 25 TABLET ORAL at 08:35

## 2017-10-19 RX ADMIN — POTASSIUM CHLORIDE, DEXTROSE MONOHYDRATE AND SODIUM CHLORIDE: 150; 5; 900 INJECTION, SOLUTION INTRAVENOUS at 20:51

## 2017-10-19 RX ADMIN — ENOXAPARIN SODIUM 40 MG: 100 INJECTION SUBCUTANEOUS at 08:22

## 2017-10-19 RX ADMIN — METOCLOPRAMIDE 10 MG: 10 TABLET ORAL at 06:01

## 2017-10-19 RX ADMIN — ONDANSETRON 4 MG: 4 TABLET, ORALLY DISINTEGRATING ORAL at 01:18

## 2017-10-19 ASSESSMENT — ENCOUNTER SYMPTOMS
DIZZINESS: 0
FEVER: 0
HEADACHES: 0
EYES NEGATIVE: 1
DEPRESSION: 0
NAUSEA: 1
HEMOPTYSIS: 0
LOSS OF CONSCIOUSNESS: 0
CHILLS: 0
ORTHOPNEA: 0
ABDOMINAL PAIN: 1
FALLS: 0
SPUTUM PRODUCTION: 0
VOMITING: 1
WEAKNESS: 1
PALPITATIONS: 0
CONSTIPATION: 0
MYALGIAS: 0
STRIDOR: 0
COUGH: 0

## 2017-10-19 ASSESSMENT — PAIN SCALES - GENERAL
PAINLEVEL_OUTOF10: 3
PAINLEVEL_OUTOF10: 7

## 2017-10-19 NOTE — PROGRESS NOTES
[facility] Hospitalist Progress Note    Date of Service: 10/19/2017    Chief Complaint  62 y.o. female admitted 10/12/2017 with abdominal pain.    Interval Problem Update  Still feeling distended in her abdomen. Surgery saw her and plan to take her to OR later today for SOB.  Discussed plan patient edition with nurse at bedside    Consultants/Specialty  Surgery-Dr. Doll    Disposition  Patient requires additional treatment in the hospital        Review of Systems   Constitutional: Positive for malaise/fatigue. Negative for chills and fever.   HENT: Negative for congestion.    Eyes: Negative.    Respiratory: Negative for cough, hemoptysis, sputum production and stridor.    Cardiovascular: Negative for chest pain, palpitations and orthopnea.   Gastrointestinal: Positive for abdominal pain, nausea and vomiting. Negative for constipation.   Genitourinary: Negative for dysuria and urgency.   Musculoskeletal: Positive for joint pain. Negative for falls and myalgias.   Skin: Negative.    Neurological: Positive for weakness. Negative for dizziness, loss of consciousness and headaches.   Psychiatric/Behavioral: Negative for depression and suicidal ideas.      Physical Exam  Laboratory/Imaging   Hemodynamics  Temp (24hrs), Av.6 °C (97.9 °F), Min:36.1 °C (97 °F), Max:37 °C (98.6 °F)   Temperature: 36.7 °C (98.1 °F)  Pulse  Av.9  Min: 75  Max: 142   Blood Pressure: 122/71      Respiratory      Respiration: 16, Pulse Oximetry: 95 %             Fluids    Intake/Output Summary (Last 24 hours) at 10/19/17 0807  Last data filed at 10/19/17 0400   Gross per 24 hour   Intake           1082.5 ml   Output                0 ml   Net           1082.5 ml       Nutrition  Orders Placed This Encounter   Procedures   • DIET ORDER     Standing Status:   Standing     Number of Occurrences:   1     Order Specific Question:   Diet:     Answer:   Regular [1]     Physical Exam   Constitutional: She is oriented to person, place, and time. No  distress.   HENT:   Head: Normocephalic and atraumatic.   Left Ear: External ear normal.   Mouth/Throat: No oropharyngeal exudate.   Eyes: Conjunctivae are normal. Right eye exhibits no discharge. Left eye exhibits no discharge.   Neck: Neck supple. No thyromegaly present.   Cardiovascular: Normal rate and regular rhythm.    Murmur heard.  Pulmonary/Chest: Effort normal and breath sounds normal. No respiratory distress. She has no wheezes.   Abdominal: Soft. Bowel sounds are normal. She exhibits distension. There is tenderness. There is no rebound and no guarding.   Musculoskeletal: She exhibits no edema.   Neurological: She is alert and oriented to person, place, and time. No cranial nerve deficit.   Skin: Skin is warm and dry. She is not diaphoretic. No erythema.   Psychiatric: She is slowed.   Nursing note and vitals reviewed.                               Assessment/Plan     SBO (small bowel obstruction)- (present on admission)   Assessment & Plan    - Small bowel follow-through 10/13 with no obstruction, NG removed  - but she continued to have distension and vomiting  - KUB: showed partial SBO  - surgery later today  - NPO        Anal carcinoma (CMS-HCC)- (present on admission)   Assessment & Plan    - Chronic, palliative care to see, outpatient follow-up        Chest pain   Assessment & Plan    - GI in nature, resolved with GI cocktail        Sepsis (CMS-HCC)- (present on admission)   Assessment & Plan    - This is sepsis (without associated acute organ dysfunction).   - Due to urinary tract infection  - resolved        Hyponatremia- (present on admission)   Assessment & Plan    - Resolved        Normocytic anemia- (present on admission)   Assessment & Plan    - Chronic, no sign of gross bleeding        Rectal cancer (CMS-HCC)- (present on admission)   Assessment & Plan    - Patient is on experimental drug as an outpatient, continue outpatient follow-up  - This medication can cause pain, had been weaned down  per nurse who works in the study        Hypokalemia- (present on admission)   Assessment & Plan    - Improved, continue oral potassium   - Repeat BMP in the morning        UTI (urinary tract infection)- (present on admission)   Assessment & Plan    - completed abx, culture: negative            Reviewed items::  Labs reviewed, Medications reviewed and Radiology images reviewed  DVT prophylaxis pharmacological::  Enoxaparin (Lovenox)  Antibiotics:  Treating active infection/contamination beyond 24 hours perioperative coverage

## 2017-10-19 NOTE — PROGRESS NOTES
Patient alert and oriented x 4. Patient vomited 640 ml brown liquid  after given po meds. Hospitalist notified new orders received. Tele monitor continues.

## 2017-10-19 NOTE — PROGRESS NOTES
Patient was unable to tolerate PO medications. Patient vomited profusely immediately after taking pills in am. Moderate output and green in appearance.

## 2017-10-19 NOTE — PROGRESS NOTES
Dr Doll called unit. Surgery had to be pushed to tomorrow morning at 1030. Keep NPO and NG tube to suction.

## 2017-10-19 NOTE — PROGRESS NOTES
A/O X 4 and on 0.5L O2.   VSS. Last labs drawn on 10/16/2017. Only drawing labs q3-4 days.    -BM, last was approx a week ago.  BS hypoactive X 4. +void, incontinent of urine.   Patient has +3 edema BLE.   Chemo port present and accessed on RUC. Running D5 NS w/20K @ 50mL/hr.   Patient reports abdominal pain level of 6/10.   Patient has skin tear on buttocks d/t lack of mobility.   Call light at bedside.

## 2017-10-20 ENCOUNTER — APPOINTMENT (OUTPATIENT)
Dept: RADIOLOGY | Facility: MEDICAL CENTER | Age: 62
DRG: 854 | End: 2017-10-20
Attending: SURGERY
Payer: MEDICAID

## 2017-10-20 PROCEDURE — 770020 HCHG ROOM/CARE - TELE (206)

## 2017-10-20 PROCEDURE — 501570 HCHG TROCAR, SEPARATOR: Performed by: SURGERY

## 2017-10-20 PROCEDURE — 160002 HCHG RECOVERY MINUTES (STAT): Performed by: SURGERY

## 2017-10-20 PROCEDURE — 160035 HCHG PACU - 1ST 60 MINS PHASE I: Performed by: SURGERY

## 2017-10-20 PROCEDURE — 700101 HCHG RX REV CODE 250: Performed by: INTERNAL MEDICINE

## 2017-10-20 PROCEDURE — 500868 HCHG NEEDLE, SURGI(VARES): Performed by: SURGERY

## 2017-10-20 PROCEDURE — 160009 HCHG ANES TIME/MIN: Performed by: SURGERY

## 2017-10-20 PROCEDURE — 700111 HCHG RX REV CODE 636 W/ 250 OVERRIDE (IP): Performed by: SURGERY

## 2017-10-20 PROCEDURE — 501445 HCHG STAPLER, SKIN DISP: Performed by: SURGERY

## 2017-10-20 PROCEDURE — 49465 FLUORO EXAM OF G/COLON TUBE: CPT

## 2017-10-20 PROCEDURE — 500122 HCHG BOVIE, BLADE: Performed by: SURGERY

## 2017-10-20 PROCEDURE — 160048 HCHG OR STATISTICAL LEVEL 1-5: Performed by: SURGERY

## 2017-10-20 PROCEDURE — 700111 HCHG RX REV CODE 636 W/ 250 OVERRIDE (IP)

## 2017-10-20 PROCEDURE — 700101 HCHG RX REV CODE 250

## 2017-10-20 PROCEDURE — 99232 SBSQ HOSP IP/OBS MODERATE 35: CPT | Performed by: INTERNAL MEDICINE

## 2017-10-20 PROCEDURE — 502571 HCHG PACK, LAP CHOLE: Performed by: SURGERY

## 2017-10-20 PROCEDURE — 500042 HCHG BAG, URINARY DRAINAGE (CLOSED): Performed by: SURGERY

## 2017-10-20 PROCEDURE — 160028 HCHG SURGERY MINUTES - 1ST 30 MINS LEVEL 3: Performed by: SURGERY

## 2017-10-20 PROCEDURE — 500002 HCHG ADHESIVE, DERMABOND: Performed by: SURGERY

## 2017-10-20 PROCEDURE — 160039 HCHG SURGERY MINUTES - EA ADDL 1 MIN LEVEL 3: Performed by: SURGERY

## 2017-10-20 PROCEDURE — 501583 HCHG TROCAR, THRD CAN&SEAL 5X100: Performed by: SURGERY

## 2017-10-20 PROCEDURE — 160036 HCHG PACU - EA ADDL 30 MINS PHASE I: Performed by: SURGERY

## 2017-10-20 PROCEDURE — 700105 HCHG RX REV CODE 258

## 2017-10-20 PROCEDURE — 0WJG4ZZ INSPECTION OF PERITONEAL CAVITY, PERCUTANEOUS ENDOSCOPIC APPROACH: ICD-10-PCS | Performed by: SURGERY

## 2017-10-20 PROCEDURE — 700111 HCHG RX REV CODE 636 W/ 250 OVERRIDE (IP): Performed by: INTERNAL MEDICINE

## 2017-10-20 PROCEDURE — 0DH64UZ INSERTION OF FEEDING DEVICE INTO STOMACH, PERCUTANEOUS ENDOSCOPIC APPROACH: ICD-10-PCS | Performed by: SURGERY

## 2017-10-20 PROCEDURE — 501623 HCHG TUBE, GASTROSTOMY: Performed by: SURGERY

## 2017-10-20 PROCEDURE — 501838 HCHG SUTURE GENERAL: Performed by: SURGERY

## 2017-10-20 RX ORDER — SODIUM CHLORIDE 9 MG/ML
INJECTION, SOLUTION INTRAVENOUS
Status: COMPLETED
Start: 2017-10-20 | End: 2017-10-20

## 2017-10-20 RX ORDER — BUPIVACAINE HYDROCHLORIDE AND EPINEPHRINE 5; 5 MG/ML; UG/ML
INJECTION, SOLUTION EPIDURAL; INTRACAUDAL; PERINEURAL
Status: DISCONTINUED | OUTPATIENT
Start: 2017-10-20 | End: 2017-10-20 | Stop reason: HOSPADM

## 2017-10-20 RX ORDER — HYDROMORPHONE HYDROCHLORIDE 2 MG/ML
INJECTION, SOLUTION INTRAMUSCULAR; INTRAVENOUS; SUBCUTANEOUS
Status: COMPLETED
Start: 2017-10-20 | End: 2017-10-20

## 2017-10-20 RX ADMIN — HYDROMORPHONE HYDROCHLORIDE 0.2 MG: 2 INJECTION INTRAMUSCULAR; INTRAVENOUS; SUBCUTANEOUS at 12:35

## 2017-10-20 RX ADMIN — FENTANYL CITRATE 50 MCG: 50 INJECTION, SOLUTION INTRAMUSCULAR; INTRAVENOUS at 12:14

## 2017-10-20 RX ADMIN — HYDROMORPHONE HYDROCHLORIDE 0.2 MG: 2 INJECTION INTRAMUSCULAR; INTRAVENOUS; SUBCUTANEOUS at 12:29

## 2017-10-20 RX ADMIN — HYDROMORPHONE HYDROCHLORIDE 1 MG: 2 INJECTION INTRAMUSCULAR; INTRAVENOUS; SUBCUTANEOUS at 13:02

## 2017-10-20 RX ADMIN — FENTANYL CITRATE 50 MCG: 50 INJECTION, SOLUTION INTRAMUSCULAR; INTRAVENOUS at 12:01

## 2017-10-20 RX ADMIN — HYDROMORPHONE HYDROCHLORIDE 0.2 MG: 2 INJECTION INTRAMUSCULAR; INTRAVENOUS; SUBCUTANEOUS at 12:55

## 2017-10-20 RX ADMIN — MORPHINE SULFATE 4 MG: 4 INJECTION INTRAVENOUS at 18:31

## 2017-10-20 RX ADMIN — FAMOTIDINE 20 MG: 10 INJECTION, SOLUTION INTRAVENOUS at 21:18

## 2017-10-20 RX ADMIN — HYDROMORPHONE HYDROCHLORIDE 0.5 MG: 2 INJECTION INTRAMUSCULAR; INTRAVENOUS; SUBCUTANEOUS at 12:50

## 2017-10-20 RX ADMIN — ONDANSETRON 4 MG: 2 INJECTION INTRAMUSCULAR; INTRAVENOUS at 14:11

## 2017-10-20 RX ADMIN — HYDROMORPHONE HYDROCHLORIDE 0.2 MG: 2 INJECTION INTRAMUSCULAR; INTRAVENOUS; SUBCUTANEOUS at 12:23

## 2017-10-20 RX ADMIN — MORPHINE SULFATE 4 MG: 4 INJECTION INTRAVENOUS at 14:11

## 2017-10-20 RX ADMIN — MORPHINE SULFATE 4 MG: 4 INJECTION INTRAVENOUS at 05:54

## 2017-10-20 RX ADMIN — FAMOTIDINE 20 MG: 10 INJECTION, SOLUTION INTRAVENOUS at 08:52

## 2017-10-20 RX ADMIN — HYDROMORPHONE HYDROCHLORIDE 0.2 MG: 2 INJECTION INTRAMUSCULAR; INTRAVENOUS; SUBCUTANEOUS at 12:40

## 2017-10-20 RX ADMIN — SODIUM CHLORIDE: 9 INJECTION, SOLUTION INTRAVENOUS at 12:30

## 2017-10-20 RX ADMIN — POTASSIUM CHLORIDE, DEXTROSE MONOHYDRATE AND SODIUM CHLORIDE: 150; 5; 900 INJECTION, SOLUTION INTRAVENOUS at 22:54

## 2017-10-20 RX ADMIN — HYDROMORPHONE HYDROCHLORIDE 0.2 MG: 2 INJECTION INTRAMUSCULAR; INTRAVENOUS; SUBCUTANEOUS at 12:45

## 2017-10-20 RX ADMIN — MORPHINE SULFATE 4 MG: 4 INJECTION INTRAVENOUS at 22:41

## 2017-10-20 ASSESSMENT — PAIN SCALES - GENERAL
PAINLEVEL_OUTOF10: 3
PAINLEVEL_OUTOF10: 6
PAINLEVEL_OUTOF10: 10
PAINLEVEL_OUTOF10: 8
PAINLEVEL_OUTOF10: 7
PAINLEVEL_OUTOF10: 6
PAINLEVEL_OUTOF10: 4
PAINLEVEL_OUTOF10: 3
PAINLEVEL_OUTOF10: 4
PAINLEVEL_OUTOF10: 10
PAINLEVEL_OUTOF10: 8
PAINLEVEL_OUTOF10: 4

## 2017-10-20 ASSESSMENT — ENCOUNTER SYMPTOMS
CONSTIPATION: 1
FALLS: 0
FEVER: 0
CHILLS: 0
HEMOPTYSIS: 0
HEADACHES: 0
DEPRESSION: 0
EYES NEGATIVE: 1
DIZZINESS: 0
PALPITATIONS: 0
NAUSEA: 1
WEAKNESS: 1
COUGH: 0
LOSS OF CONSCIOUSNESS: 0
VOMITING: 1
ABDOMINAL PAIN: 1
MYALGIAS: 0

## 2017-10-20 ASSESSMENT — LIFESTYLE VARIABLES: DO YOU DRINK ALCOHOL: NO

## 2017-10-20 NOTE — PALLIATIVE CARE
PALLIATIVE CARE UPDATE:  POC for diagnostic laparoscopy, possible laparotomy, possible ostomy. Message left for Umm Oliver to request AD be notarized ASAP as patient is scheduled for surgery. Encouraged to call #[phone] with questions.

## 2017-10-20 NOTE — PROGRESS NOTES
Patient alert and oriented x 4. Hospitalist called and notified that patient is strict NPO and unable to take po meds. New order received. NG in place with green output. Patient complained of abdominal pain IV Morphine given with positive effect. Tele monitoring continues.

## 2017-10-20 NOTE — PROGRESS NOTES
Pt. A/ox4, VSS, complains of mild abdominal pain 3/10. Received PRN morphine IV with night shift. (see MAR) . NGT in place to LCWS. Denies n/v. Discussed plan of care and questions answered. Hourly rounding in place.

## 2017-10-20 NOTE — CARE PLAN
Problem: Pain Management  Goal: Pain level will decrease to patient's comfort goal    Intervention: Follow pain managment plan developed in collaboration with patient and Interdisciplinary Team  Pain medication administer as scheduled.

## 2017-10-20 NOTE — OR SURGEON
Immediate Post OP Note    PreOp Diagnosis: sbo    PostOp Diagnosis:   1.  Carcinomatosis  2.  Malignant obstruction    Procedure(s):  LAPAROSCOPY - Wound Class: Clean Contaminated  GASTROSTOMY LAPAROSCOPIC - Wound Class: Clean Contaminated    Surgeon(s):  Galindo Doll M.D.    Anesthesiologist/Type of Anesthesia:  Anesthesiologist: Celso Conrad M.D./General    Surgical Staff:  Circulator: Cali Fuentes R.N.  Relief Circulator: Abraham Calvillo R.N.  Scrub Person: Yajaira Collins    Specimens:  None    Estimated Blood Loss: 5 cc    Findings: 1. Carcinomatosis. 2. Malignant obstruction with cemented right lower quadrant    Complications: None noted    Indications: Patient is a 62-year-old female with a history of metastatic cancer presented with a bowel obstruction. We initially tried to treat her conservatively and a small bowel series revealed flow into the colon however she had persistent nausea and vomiting. Follow-up KUB showed the contrast was still in the colon. I was suspicious for a colonic obstruction. The patient was counseled extensively as to the risks versus the benefits of surgery and agree to proceed for informed.    Description:    The patient was prepped and draped in the standard sterile surgical fashion after induction of general anesthesia. Appropriate timeout was performed and a buttocks delivered. Palpation of the stomach revealed a right lower quadrant mass concerning given her history.     I performed a left upper quadrant Veress insertion. The abdomen was insufflated to 15 was reviewed CO2. I placed a 5 mm Visiport. Abdomen was inspected. There was evident carcinomatosis in significant volume throughout the peritoneum. The right lower quadrant was cemented in evident of a malignant obstruction. At that point I did not feel proceeding would be beneficial to the patient and would likely cause more injury.    At that point I elected to place a gastrostomy tube in order to help the  patient transitioned from her nasogastric tube. I used the kit with T fasteners. These were placed from the stomach was insufflated. I used the Seldinger technique to place a 16 [place] feeding tube. The balloon was filled with saline and retracted against the abdominal wall. I did suture this into place with 2-0 Vicryl suture. Once that was done instruments were withdrawn. Monocryl was used for skin edges. Dermabond was applied as a dressing.    Disposition:    The patient will be returned to the surgical floor in guarded condition. It palliative care consult as already been initiated. I would consider comfort care.        10/20/2017 11:32 AM Galindo Doll

## 2017-10-20 NOTE — PROGRESS NOTES
Notified pt is NPO but has a lot of po meds ordered including BP meds. Will add iv metoprolol prn for tachycardia or hypertension. She is to go to OR tomorrow.

## 2017-10-20 NOTE — PROGRESS NOTES
[facility] Hospitalist Progress Note    Date of Service: 10/20/2017    Chief Complaint  62 y.o. female admitted 10/12/2017 with abdominal pain.    Interval Problem Update  Still having nausea, vomiting, abdominal distension. Plan surgery today  Discussed plan patient edition with nurse at bedside    Consultants/Specialty  Surgery-Dr. Doll    Disposition  Patient requires additional treatment in the hospital        Review of Systems   Constitutional: Positive for malaise/fatigue. Negative for chills and fever.   HENT: Negative for congestion.    Eyes: Negative.    Respiratory: Negative for cough and hemoptysis.    Cardiovascular: Negative for chest pain and palpitations.   Gastrointestinal: Positive for abdominal pain, constipation, nausea and vomiting.   Genitourinary: Negative for dysuria and urgency.   Musculoskeletal: Positive for joint pain. Negative for falls and myalgias.   Skin: Negative.    Neurological: Positive for weakness. Negative for dizziness, loss of consciousness and headaches.   Psychiatric/Behavioral: Negative for depression and suicidal ideas.      Physical Exam  Laboratory/Imaging   Hemodynamics  Temp (24hrs), Av.7 °C (98.1 °F), Min:36.1 °C (97 °F), Max:37.6 °C (99.6 °F)   Temperature: 36.1 °C (97 °F)  Pulse  Av.6  Min: 75  Max: 142   Blood Pressure: 127/64      Respiratory      Respiration: 16, Pulse Oximetry: 95 %             Fluids    Intake/Output Summary (Last 24 hours) at 10/20/17 0808  Last data filed at 10/20/17 0400   Gross per 24 hour   Intake              810 ml   Output             2000 ml   Net            -1190 ml       Nutrition  Orders Placed This Encounter   Procedures   • DIET NPO     Standing Status:   Standing     Number of Occurrences:   1     Order Specific Question:   Restrict to:     Answer:   Strict [1]     Physical Exam   Constitutional: She is oriented to person, place, and time. No distress.   HENT:   Head: Normocephalic and atraumatic.   Left Ear: External ear  normal.   Mouth/Throat: No oropharyngeal exudate.   Eyes: Conjunctivae are normal. Right eye exhibits no discharge.   Neck: Neck supple.   Cardiovascular: Normal rate and regular rhythm.    Murmur heard.  Pulmonary/Chest: Effort normal and breath sounds normal. No respiratory distress.   Abdominal: Soft. Bowel sounds are normal. She exhibits distension. There is tenderness. There is no rebound and no guarding.   Musculoskeletal: She exhibits no edema.   Neurological: She is alert and oriented to person, place, and time. No cranial nerve deficit.   Skin: Skin is warm and dry. She is not diaphoretic.   Psychiatric: She is slowed.   Nursing note and vitals reviewed.                               Assessment/Plan     SBO (small bowel obstruction)- (present on admission)   Assessment & Plan    - Small bowel follow-through 10/13 with no obstruction, NG removed  - but she continued to have distension and vomiting  - KUB: showed partial SBO  - surgery today          Anal carcinoma (CMS-HCC)- (present on admission)   Assessment & Plan    - Chronic, palliative care to see, outpatient follow-up        Chest pain   Assessment & Plan    - GI in nature, resolved with GI cocktail        Sepsis (CMS-HCC)- (present on admission)   Assessment & Plan    - This is sepsis (without associated acute organ dysfunction).   - Due to urinary tract infection  - resolved        Hyponatremia- (present on admission)   Assessment & Plan    - Resolved        Normocytic anemia- (present on admission)   Assessment & Plan    - Chronic, no sign of gross bleeding        Rectal cancer (CMS-HCC)- (present on admission)   Assessment & Plan    - Patient is on experimental drug as an outpatient, continue outpatient follow-up  - This medication can cause pain, had been weaned down per nurse who works in the study        Hypokalemia- (present on admission)   Assessment & Plan    - Improved, continue oral potassium   - Repeat BMP in the morning        UTI  (urinary tract infection)- (present on admission)   Assessment & Plan    - completed abx, culture: negative            Reviewed items::  Labs reviewed, Medications reviewed and Radiology images reviewed  DVT prophylaxis pharmacological::  Enoxaparin (Lovenox)  Antibiotics:  Treating active infection/contamination beyond 24 hours perioperative coverage

## 2017-10-20 NOTE — DIETARY
Nutrition Services: Update/RD follow-up note    Labs: glucose: 58 Na: 136 K: 3.4 M.6   Meds: pepcid, reglan, metoprolol, senna  GI: last BM 10/14  Skin: surgical incision; skin tear - otherwise, intact  Diet: NPO x 1 day    Pt previously on regular diet w/ fluctuating PO of 25-75% intake of meals, and <25% at times. Pt w/ s/p gastrostomy laparoscopic this date is NPO at this time. Therefore, as soon as medically feasible ADAT back to previous diet order of regular diet.     Otherwise, RD to monitor for diet advancement vs need for nutrition intervention, will leave recommendations accordingly.

## 2017-10-20 NOTE — PROGRESS NOTES
Since placement of NG tube patient has been comfortable. No recent reports of nausea and vomiting.

## 2017-10-20 NOTE — PROGRESS NOTES
Patient alert and oriented x 4. Dressing changed to right side forehead. Sutures intact. Small amount of bleeding noted. 2 guards at bedside. Patient continues in restraint. Band aids in place to back and left arm.

## 2017-10-20 NOTE — PROGRESS NOTES
Surgery    Plan for diagnostic laparoscopy with possible laparotomy and possible ostomy.      I discussed the various possible scenarios with the patient and her family this morning.   She appeared to understand, her questions were answered and she agreed to proceed fully informed

## 2017-10-21 LAB
ALBUMIN SERPL BCP-MCNC: 2.1 G/DL (ref 3.2–4.9)
ALBUMIN/GLOB SERPL: 0.8 G/DL
ALP SERPL-CCNC: 63 U/L (ref 30–99)
ALT SERPL-CCNC: 7 U/L (ref 2–50)
ANION GAP SERPL CALC-SCNC: 8 MMOL/L (ref 0–11.9)
AST SERPL-CCNC: 20 U/L (ref 12–45)
BASOPHILS # BLD AUTO: 0.2 % (ref 0–1.8)
BASOPHILS # BLD: 0.02 K/UL (ref 0–0.12)
BILIRUB SERPL-MCNC: 0.5 MG/DL (ref 0.1–1.5)
BUN SERPL-MCNC: 6 MG/DL (ref 8–22)
CALCIUM SERPL-MCNC: 8 MG/DL (ref 8.5–10.5)
CHLORIDE SERPL-SCNC: 105 MMOL/L (ref 96–112)
CO2 SERPL-SCNC: 27 MMOL/L (ref 20–33)
CREAT SERPL-MCNC: 0.48 MG/DL (ref 0.5–1.4)
EOSINOPHIL # BLD AUTO: 0 K/UL (ref 0–0.51)
EOSINOPHIL NFR BLD: 0 % (ref 0–6.9)
ERYTHROCYTE [DISTWIDTH] IN BLOOD BY AUTOMATED COUNT: 53.9 FL (ref 35.9–50)
GFR SERPL CREATININE-BSD FRML MDRD: >60 ML/MIN/1.73 M 2
GLOBULIN SER CALC-MCNC: 2.8 G/DL (ref 1.9–3.5)
GLUCOSE SERPL-MCNC: 113 MG/DL (ref 65–99)
HCT VFR BLD AUTO: 28.4 % (ref 37–47)
HGB BLD-MCNC: 8.9 G/DL (ref 12–16)
IMM GRANULOCYTES # BLD AUTO: 0.08 K/UL (ref 0–0.11)
IMM GRANULOCYTES NFR BLD AUTO: 0.7 % (ref 0–0.9)
LYMPHOCYTES # BLD AUTO: 0.96 K/UL (ref 1–4.8)
LYMPHOCYTES NFR BLD: 8.9 % (ref 22–41)
MCH RBC QN AUTO: 28.7 PG (ref 27–33)
MCHC RBC AUTO-ENTMCNC: 31.3 G/DL (ref 33.6–35)
MCV RBC AUTO: 91.6 FL (ref 81.4–97.8)
MONOCYTES # BLD AUTO: 0.57 K/UL (ref 0–0.85)
MONOCYTES NFR BLD AUTO: 5.3 % (ref 0–13.4)
NEUTROPHILS # BLD AUTO: 9.18 K/UL (ref 2–7.15)
NEUTROPHILS NFR BLD: 84.9 % (ref 44–72)
NRBC # BLD AUTO: 0 K/UL
NRBC BLD AUTO-RTO: 0 /100 WBC
PLATELET # BLD AUTO: 367 K/UL (ref 164–446)
PMV BLD AUTO: 9.5 FL (ref 9–12.9)
POTASSIUM SERPL-SCNC: 4.2 MMOL/L (ref 3.6–5.5)
PROT SERPL-MCNC: 4.9 G/DL (ref 6–8.2)
RBC # BLD AUTO: 3.1 M/UL (ref 4.2–5.4)
SODIUM SERPL-SCNC: 140 MMOL/L (ref 135–145)
WBC # BLD AUTO: 10.8 K/UL (ref 4.8–10.8)

## 2017-10-21 PROCEDURE — 700111 HCHG RX REV CODE 636 W/ 250 OVERRIDE (IP): Performed by: SURGERY

## 2017-10-21 PROCEDURE — 700111 HCHG RX REV CODE 636 W/ 250 OVERRIDE (IP): Performed by: INTERNAL MEDICINE

## 2017-10-21 PROCEDURE — 700101 HCHG RX REV CODE 250: Performed by: INTERNAL MEDICINE

## 2017-10-21 PROCEDURE — 85025 COMPLETE CBC W/AUTO DIFF WBC: CPT

## 2017-10-21 PROCEDURE — 770020 HCHG ROOM/CARE - TELE (206)

## 2017-10-21 PROCEDURE — 302151 K-PAD 14X20: Performed by: INTERNAL MEDICINE

## 2017-10-21 PROCEDURE — 306580 SET INFUSION,POWERLOC 20G X.75: Performed by: INTERNAL MEDICINE

## 2017-10-21 PROCEDURE — 80053 COMPREHEN METABOLIC PANEL: CPT

## 2017-10-21 PROCEDURE — 99232 SBSQ HOSP IP/OBS MODERATE 35: CPT | Performed by: INTERNAL MEDICINE

## 2017-10-21 RX ORDER — KETOROLAC TROMETHAMINE 30 MG/ML
30 INJECTION, SOLUTION INTRAMUSCULAR; INTRAVENOUS ONCE
Status: COMPLETED | OUTPATIENT
Start: 2017-10-21 | End: 2017-10-21

## 2017-10-21 RX ADMIN — FAMOTIDINE 20 MG: 10 INJECTION, SOLUTION INTRAVENOUS at 20:33

## 2017-10-21 RX ADMIN — MORPHINE SULFATE 4 MG: 4 INJECTION INTRAVENOUS at 20:33

## 2017-10-21 RX ADMIN — POTASSIUM CHLORIDE, DEXTROSE MONOHYDRATE AND SODIUM CHLORIDE: 150; 5; 900 INJECTION, SOLUTION INTRAVENOUS at 20:33

## 2017-10-21 RX ADMIN — MORPHINE SULFATE 4 MG: 4 INJECTION INTRAVENOUS at 16:16

## 2017-10-21 RX ADMIN — FAMOTIDINE 20 MG: 10 INJECTION, SOLUTION INTRAVENOUS at 09:46

## 2017-10-21 RX ADMIN — ENOXAPARIN SODIUM 40 MG: 100 INJECTION SUBCUTANEOUS at 09:46

## 2017-10-21 RX ADMIN — KETOROLAC TROMETHAMINE 30 MG: 30 INJECTION, SOLUTION INTRAMUSCULAR at 14:22

## 2017-10-21 RX ADMIN — MORPHINE SULFATE 4 MG: 4 INJECTION INTRAVENOUS at 12:11

## 2017-10-21 RX ADMIN — MORPHINE SULFATE 4 MG: 4 INJECTION INTRAVENOUS at 02:56

## 2017-10-21 RX ADMIN — MORPHINE SULFATE 4 MG: 4 INJECTION INTRAVENOUS at 07:31

## 2017-10-21 ASSESSMENT — ENCOUNTER SYMPTOMS
DEPRESSION: 0
LOSS OF CONSCIOUSNESS: 0
ORTHOPNEA: 0
CHILLS: 0
ABDOMINAL PAIN: 1
FALLS: 0
DIZZINESS: 0
HEADACHES: 0
CONSTIPATION: 1
VOMITING: 1
MYALGIAS: 0
NAUSEA: 1
WEAKNESS: 1
COUGH: 0
PALPITATIONS: 0
HEMOPTYSIS: 0
FEVER: 0

## 2017-10-21 ASSESSMENT — PAIN SCALES - GENERAL
PAINLEVEL_OUTOF10: 9
PAINLEVEL_OUTOF10: 4
PAINLEVEL_OUTOF10: 8
PAINLEVEL_OUTOF10: 7
PAINLEVEL_OUTOF10: 5
PAINLEVEL_OUTOF10: 4

## 2017-10-21 NOTE — CARE PLAN
Problem: Safety  Goal: Will remain free from falls    Intervention: Assess risk factors for falls  Educated pt to call for assistance with any needs. Pt stated understanding of the risks and will comply with request.       Problem: Pain Management  Goal: Pain level will decrease to patient's comfort goal    Intervention: Follow pain managment plan developed in collaboration with patient and Interdisciplinary Team  Will continue to provide PRN pain management medication when available. Pt tolerating at this time.

## 2017-10-21 NOTE — PROGRESS NOTES
Pt A&Ox4, sitting up in bed. C/o abdominal pain, heat pack provided. Denies n/v/. Hypoactive bowel sounds, -flatus, -BM. G-tube in place to gravity drainage. Per MD, okay to have clear liquids. Educated to drink slowly and education provided that fluids will come right back out in g-tube.  in use, saturating >90% on 1L NC. Telebox in use, sinus rhythm. Bed locked in lowest position, call light within reach. Hourly rounding in place. Discussed plan of care, verbalizes understanding.

## 2017-10-21 NOTE — CARE PLAN
Problem: Venous Thromboembolism (VTW)/Deep Vein Thrombosis (DVT) Prevention:  Goal: Patient will participate in Venous Thrombosis (VTE)/Deep Vein Thrombosis (DVT)Prevention Measures  Outcome: PROGRESSING AS EXPECTED  SCD, lovenox    Problem: Bowel/Gastric:  Goal: Normal bowel function is maintained or improved  Outcome: PROGRESSING AS EXPECTED  -BM, -flattus, hypoactive BS. G-tube draining to gravity    Problem: Pain Management  Goal: Pain level will decrease to patient's comfort goal  Outcome: PROGRESSING AS EXPECTED  Controlled with PRN morphine, heat packs    Problem: Urinary Elimination:  Goal: Ability to reestablish a normal urinary elimination pattern will improve  Outcome: PROGRESSING AS EXPECTED  Ivan in place

## 2017-10-21 NOTE — PROGRESS NOTES
[facility] Hospitalist Progress Note    Date of Service: 10/21/2017    Chief Complaint  62 y.o. female admitted 10/12/2017 with abdominal pain.    Interval Problem Update  Feeling a little better per patient. Had a very long discussion with the patient and family about her prognosis. Discussed with Dr. MA as well. Poor prognosis.    Consultants/Specialty  Surgery-Dr. Doll    Disposition  Patient requires additional treatment in the hospital        Review of Systems   Constitutional: Positive for malaise/fatigue. Negative for chills and fever.   HENT: Negative for congestion and hearing loss.    Respiratory: Negative for cough and hemoptysis.    Cardiovascular: Negative for chest pain, palpitations and orthopnea.   Gastrointestinal: Positive for abdominal pain, constipation, nausea and vomiting.   Genitourinary: Negative for dysuria, frequency and urgency.   Musculoskeletal: Positive for joint pain. Negative for falls and myalgias.   Neurological: Positive for weakness. Negative for dizziness, loss of consciousness and headaches.   Psychiatric/Behavioral: Negative for depression and suicidal ideas.      Physical Exam  Laboratory/Imaging   Hemodynamics  Temp (24hrs), Av.6 °C (97.8 °F), Min:36.4 °C (97.5 °F), Max:37.2 °C (98.9 °F)   Temperature: 36.4 °C (97.6 °F)  Pulse  Av.3  Min: 75  Max: 142 Heart Rate (Monitored): 91  Blood Pressure: 156/86, NIBP: 145/78      Respiratory      Respiration: 16, Pulse Oximetry: 94 %             Fluids    Intake/Output Summary (Last 24 hours) at 10/21/17 0829  Last data filed at 10/21/17 0400   Gross per 24 hour   Intake             1600 ml   Output             1045 ml   Net              555 ml       Nutrition  Orders Placed This Encounter   Procedures   • DIET NPO     Standing Status:   Standing     Number of Occurrences:   1     Order Specific Question:   Restrict to:     Answer:   Strict [1]     Physical Exam   Constitutional: She is oriented to person, place, and time. No  distress.   HENT:   Head: Normocephalic and atraumatic.   Left Ear: External ear normal.   Eyes: Conjunctivae are normal. Pupils are equal, round, and reactive to light. Right eye exhibits no discharge.   Neck: Normal range of motion. Neck supple. No thyromegaly present.   Cardiovascular: Normal rate and regular rhythm.    Murmur heard.  Pulmonary/Chest: Effort normal and breath sounds normal. No respiratory distress. She has no wheezes.   Abdominal: Soft. Bowel sounds are normal. She exhibits distension. There is tenderness. There is no rebound.   Musculoskeletal: She exhibits no edema.   Neurological: She is alert and oriented to person, place, and time. No cranial nerve deficit.   Skin: Skin is warm and dry. She is not diaphoretic.   Psychiatric: She is slowed.   Nursing note and vitals reviewed.      Recent Labs      10/21/17   0310   WBC  10.8   RBC  3.10*   HEMOGLOBIN  8.9*   HEMATOCRIT  28.4*   MCV  91.6   MCH  28.7   MCHC  31.3*   RDW  53.9*   PLATELETCT  367   MPV  9.5     Recent Labs      10/21/17   0310   SODIUM  140   POTASSIUM  4.2   CHLORIDE  105   CO2  27   GLUCOSE  113*   BUN  6*   CREATININE  0.48*   CALCIUM  8.0*                      Assessment/Plan     SBO (small bowel obstruction)- (present on admission)   Assessment & Plan    - history of multiple surgeries in the past and radiation for her rectal cancer  - post exploratory lap: 10/20: carcinomatosis with extensive adhesions  - surgery on  - diet per surgery  - poor prognosis          Anal carcinoma (CMS-HCC)- (present on admission)   Assessment & Plan    - Chronic, palliative care to see, outpatient follow-up        Chest pain   Assessment & Plan    - GI in nature, resolved with GI cocktail        Sepsis (CMS-HCC)- (present on admission)   Assessment & Plan    - This is sepsis (without associated acute organ dysfunction).   - Due to urinary tract infection  - resolved        Hyponatremia- (present on admission)   Assessment & Plan    -  Resolved        Normocytic anemia- (present on admission)   Assessment & Plan    - Chronic, no sign of gross bleeding        Rectal cancer (CMS-HCC)- (present on admission)   Assessment & Plan    - Patient is on experimental drug as an outpatient, continue outpatient follow-up  - This medication can cause pain, had been weaned down per nurse who works in the study        Hypokalemia- (present on admission)   Assessment & Plan    - Improved, continue oral potassium   - Repeat BMP in the morning        UTI (urinary tract infection)- (present on admission)   Assessment & Plan    - completed abx            Reviewed items::  Labs reviewed, Medications reviewed and Radiology images reviewed  DVT prophylaxis pharmacological::  Enoxaparin (Lovenox)  Antibiotics:  Treating active infection/contamination beyond 24 hours perioperative coverage

## 2017-10-21 NOTE — PROGRESS NOTES
Report received.  Assumed Care.  Pt in bed, 431 1. AOx4, responds appropriately.  Reports pain of 3/10, SOB.  Plan of care discussed.  Explained importance of calling before getting OOB and pt verbalizes understanding.  Call light and belongings within reach, treaded slipper socks on, bed alarm in use, bed in lowest position.  Will monitor frequently.

## 2017-10-22 LAB — GLUCOSE BLD-MCNC: 115 MG/DL (ref 65–99)

## 2017-10-22 PROCEDURE — 700111 HCHG RX REV CODE 636 W/ 250 OVERRIDE (IP): Performed by: INTERNAL MEDICINE

## 2017-10-22 PROCEDURE — 82962 GLUCOSE BLOOD TEST: CPT

## 2017-10-22 PROCEDURE — 700101 HCHG RX REV CODE 250: Performed by: INTERNAL MEDICINE

## 2017-10-22 PROCEDURE — 700111 HCHG RX REV CODE 636 W/ 250 OVERRIDE (IP): Performed by: SURGERY

## 2017-10-22 PROCEDURE — 99232 SBSQ HOSP IP/OBS MODERATE 35: CPT | Performed by: INTERNAL MEDICINE

## 2017-10-22 PROCEDURE — 770020 HCHG ROOM/CARE - TELE (206)

## 2017-10-22 RX ORDER — MORPHINE SULFATE 4 MG/ML
4 INJECTION, SOLUTION INTRAMUSCULAR; INTRAVENOUS
Status: DISCONTINUED | OUTPATIENT
Start: 2017-10-22 | End: 2017-10-24

## 2017-10-22 RX ADMIN — MORPHINE SULFATE 4 MG: 4 INJECTION INTRAVENOUS at 04:10

## 2017-10-22 RX ADMIN — MORPHINE SULFATE 4 MG: 4 INJECTION INTRAVENOUS at 23:19

## 2017-10-22 RX ADMIN — METOPROLOL TARTRATE 5 MG: 5 INJECTION INTRAVENOUS at 03:51

## 2017-10-22 RX ADMIN — FAMOTIDINE 20 MG: 10 INJECTION, SOLUTION INTRAVENOUS at 07:40

## 2017-10-22 RX ADMIN — ONDANSETRON 4 MG: 2 INJECTION INTRAMUSCULAR; INTRAVENOUS at 20:38

## 2017-10-22 RX ADMIN — MORPHINE SULFATE 4 MG: 4 INJECTION INTRAVENOUS at 16:10

## 2017-10-22 RX ADMIN — MORPHINE SULFATE 4 MG: 4 INJECTION INTRAVENOUS at 19:28

## 2017-10-22 RX ADMIN — FAMOTIDINE 20 MG: 10 INJECTION, SOLUTION INTRAVENOUS at 20:38

## 2017-10-22 RX ADMIN — MORPHINE SULFATE 4 MG: 4 INJECTION INTRAVENOUS at 00:58

## 2017-10-22 RX ADMIN — MORPHINE SULFATE 4 MG: 4 INJECTION INTRAVENOUS at 11:01

## 2017-10-22 RX ADMIN — MORPHINE SULFATE 4 MG: 4 INJECTION INTRAVENOUS at 07:40

## 2017-10-22 RX ADMIN — PROCHLORPERAZINE EDISYLATE 10 MG: 5 INJECTION INTRAMUSCULAR; INTRAVENOUS at 23:11

## 2017-10-22 RX ADMIN — ONDANSETRON 4 MG: 2 INJECTION INTRAMUSCULAR; INTRAVENOUS at 16:07

## 2017-10-22 RX ADMIN — ONDANSETRON 4 MG: 2 INJECTION INTRAMUSCULAR; INTRAVENOUS at 07:40

## 2017-10-22 RX ADMIN — ENOXAPARIN SODIUM 40 MG: 100 INJECTION SUBCUTANEOUS at 07:41

## 2017-10-22 RX ADMIN — POTASSIUM CHLORIDE, DEXTROSE MONOHYDRATE AND SODIUM CHLORIDE: 150; 5; 900 INJECTION, SOLUTION INTRAVENOUS at 16:24

## 2017-10-22 ASSESSMENT — ENCOUNTER SYMPTOMS
WEIGHT LOSS: 0
SEIZURES: 0
SPUTUM PRODUCTION: 0
CHILLS: 0
BLURRED VISION: 0
COUGH: 0
WEAKNESS: 1
NAUSEA: 1
ORTHOPNEA: 0
NECK PAIN: 0
EYE PAIN: 0
NERVOUS/ANXIOUS: 0
FALLS: 0
MYALGIAS: 0
DIARRHEA: 0
VOMITING: 1
FEVER: 0
ABDOMINAL PAIN: 1
FOCAL WEAKNESS: 0
HEARTBURN: 0
BACK PAIN: 0
DIZZINESS: 0
EYE DISCHARGE: 0
PALPITATIONS: 0
HEMOPTYSIS: 0
DEPRESSION: 0
HEADACHES: 0
CONSTIPATION: 1

## 2017-10-22 ASSESSMENT — PAIN SCALES - GENERAL
PAINLEVEL_OUTOF10: 9
PAINLEVEL_OUTOF10: 6
PAINLEVEL_OUTOF10: 8
PAINLEVEL_OUTOF10: 7
PAINLEVEL_OUTOF10: 6
PAINLEVEL_OUTOF10: 8
PAINLEVEL_OUTOF10: 9
PAINLEVEL_OUTOF10: 6

## 2017-10-22 NOTE — PROGRESS NOTES
"Monitor Room called, Pt been Tachycardic for an hour, 120\"s, Gave PRN Metroprolol. Will check in 15 minutes for change.   "

## 2017-10-22 NOTE — CARE PLAN
Problem: Safety  Goal: Will remain free from falls    Intervention: Assess risk factors for falls  Educated pt to call for assistance with any mobilization or need. Pt willing to call at this time.

## 2017-10-22 NOTE — PROGRESS NOTES
Received change of shift report    Received family phone numbers to call with updates    Reviewed morning labs, orders, medications    Assessment done    A+O x4    Pt states pain 9/10- medicated per MAR    1L NC    Active BS x4, c/o N/V- 600ml emesis- medicated per MAR    Abd lap sites x2 CDI, well approximated with dermabond, EMILY    R port accessed, CDI, D5NS+20K running at 50ml/hr    Ivan in place with securement device, gravity draining    G tube in place    SCDs on    Heating pad provided to pt    Pitting edema to bilateral lower extremities    Bed low and locked, call light and belongings in reach, hourly rounding in place. Pt refuses bed alarm despite education.    All needs met at this time

## 2017-10-22 NOTE — PALLIATIVE CARE
PALLIATIVE CARE FOLLOW UP:  Patient sleeping. No family at bedside. Dr. Sue (oncologist) to see patient per Dr. You. Will follow-up after oncology discussion.    Thank you for allowing Palliative Care to follow this patient. Please contact us at  with any questions.

## 2017-10-22 NOTE — PROGRESS NOTES
[facility] Hospitalist Progress Note    Date of Service: 10/22/2017    Chief Complaint  62 y.o. female admitted 10/12/2017 with abdominal pain.    Interval Problem Update  Having nausea, vomiting, abdominal distension. Discussed with Dr. Sue today about her status who will see her.    Consultants/Specialty  Surgery-Dr. Doll    Disposition  Patient requires additional treatment in the hospital        Review of Systems   Constitutional: Positive for malaise/fatigue. Negative for chills, fever and weight loss.   HENT: Negative for congestion, hearing loss and nosebleeds.    Eyes: Negative for blurred vision, pain and discharge.   Respiratory: Negative for cough, hemoptysis and sputum production.    Cardiovascular: Negative for chest pain, palpitations and orthopnea.   Gastrointestinal: Positive for abdominal pain, constipation, nausea and vomiting. Negative for diarrhea and heartburn.   Genitourinary: Negative for dysuria and urgency.   Musculoskeletal: Positive for joint pain. Negative for back pain, falls, myalgias and neck pain.   Skin: Negative for itching and rash.   Neurological: Positive for weakness. Negative for dizziness, focal weakness, seizures and headaches.   Psychiatric/Behavioral: Negative for depression and suicidal ideas. The patient is not nervous/anxious.       Physical Exam  Laboratory/Imaging   Hemodynamics  Temp (24hrs), Av.1 °C (97 °F), Min:35.9 °C (96.7 °F), Max:36.3 °C (97.4 °F)   Temperature: 36.1 °C (97 °F)  Pulse  Av.3  Min: 75  Max: 142    Blood Pressure: 111/72      Respiratory      Respiration: 16, Pulse Oximetry: 96 %             Fluids    Intake/Output Summary (Last 24 hours) at 10/22/17 0804  Last data filed at 10/22/17 0400   Gross per 24 hour   Intake             2810 ml   Output             4150 ml   Net            -1340 ml       Nutrition  Orders Placed This Encounter   Procedures   • DIET ORDER     Standing Status:   Standing     Number of Occurrences:   1     Order  Specific Question:   Diet:     Answer:   Clear Liquids - No Red Foods [12]     Physical Exam   Constitutional: She is oriented to person, place, and time. No distress.   HENT:   Head: Normocephalic and atraumatic.   Left Ear: External ear normal.   Eyes: Conjunctivae and EOM are normal. Pupils are equal, round, and reactive to light. Right eye exhibits no discharge.   Neck: Normal range of motion. No thyromegaly present.   Cardiovascular: Normal rate and regular rhythm.    Murmur heard.  Pulmonary/Chest: Effort normal and breath sounds normal. No respiratory distress. She has no wheezes.   Abdominal: Soft. Bowel sounds are normal. She exhibits distension. There is tenderness. There is no rebound.   Musculoskeletal: She exhibits no edema.   Neurological: She is alert and oriented to person, place, and time. No cranial nerve deficit.   Skin: Skin is warm and dry. She is not diaphoretic.   Psychiatric: She is slowed.   Nursing note and vitals reviewed.      Recent Labs      10/21/17   0310   WBC  10.8   RBC  3.10*   HEMOGLOBIN  8.9*   HEMATOCRIT  28.4*   MCV  91.6   MCH  28.7   MCHC  31.3*   RDW  53.9*   PLATELETCT  367   MPV  9.5     Recent Labs      10/21/17   0310   SODIUM  140   POTASSIUM  4.2   CHLORIDE  105   CO2  27   GLUCOSE  113*   BUN  6*   CREATININE  0.48*   CALCIUM  8.0*                      Assessment/Plan     SBO (small bowel obstruction)- (present on admission)   Assessment & Plan    - history of multiple surgeries in the past and radiation for her rectal cancer  - post exploratory lap: 10/20: carcinomatosis with extensive adhesions  - surgery on  - diet per surgery  - poor prognosis  - seems to be worsening  - monitor closely for acute abdomen          Anal carcinoma (CMS-HCC)- (present on admission)   Assessment & Plan    - Chronic, palliative care to see, outpatient follow-up        Chest pain   Assessment & Plan    - GI in nature, resolved with GI cocktail        Sepsis (CMS-HCC)- (present on  admission)   Assessment & Plan    - This is sepsis (without associated acute organ dysfunction).   - Due to urinary tract infection  - resolved        Hyponatremia- (present on admission)   Assessment & Plan    - Resolved        Normocytic anemia- (present on admission)   Assessment & Plan    - Chronic, no sign of gross bleeding        Rectal cancer (CMS-HCC)- (present on admission)   Assessment & Plan    - Patient is on experimental drug as an outpatient, continue outpatient follow-up  - informed Dr. Sue about her condition, will see the patient.        Hypokalemia- (present on admission)   Assessment & Plan    - Improved, continue oral potassium   - Repeat BMP in the morning        UTI (urinary tract infection)- (present on admission)   Assessment & Plan    - completed abx            Reviewed items::  Labs reviewed, Medications reviewed and Radiology images reviewed  DVT prophylaxis pharmacological::  Enoxaparin (Lovenox)  Antibiotics:  Treating active infection/contamination beyond 24 hours perioperative coverage

## 2017-10-22 NOTE — CARE PLAN
Problem: Bowel/Gastric:  Goal: Normal bowel function is maintained or improved  Outcome: PROGRESSING SLOWER THAN EXPECTED  No BM, N/V, distended, tender abdomen. Medicated per MAR    Problem: Pain Management  Goal: Pain level will decrease to patient's comfort goal  Outcome: PROGRESSING AS EXPECTED  Pt pain controlled with Q3 hour pain medication per MAR

## 2017-10-22 NOTE — PROGRESS NOTES
Report received.  Assumed Care.  Pt in bed, 417. AOx4, responds appropriately.  Reports pain Medicated See MAR, SOB.  Plan of care discussed.  Explained importance of calling before getting OOB and pt verbalizes understanding.  Call light and belongings within reach, treaded slipper socks on, bed alarm in use, bed in lowest position.  Will monitor frequently.

## 2017-10-23 PROBLEM — G89.29 CHRONIC PAIN: Status: ACTIVE | Noted: 2017-10-23

## 2017-10-23 PROBLEM — E87.1 HYPONATREMIA: Status: RESOLVED | Noted: 2017-10-12 | Resolved: 2017-10-23

## 2017-10-23 PROBLEM — E87.6 HYPOKALEMIA: Status: RESOLVED | Noted: 2017-06-18 | Resolved: 2017-10-23

## 2017-10-23 PROBLEM — R07.9 CHEST PAIN: Status: RESOLVED | Noted: 2017-10-16 | Resolved: 2017-10-23

## 2017-10-23 PROBLEM — A41.9 SEPSIS (HCC): Status: RESOLVED | Noted: 2017-10-12 | Resolved: 2017-10-23

## 2017-10-23 LAB
GLUCOSE BLD-MCNC: 118 MG/DL (ref 65–99)
GLUCOSE BLD-MCNC: 130 MG/DL (ref 65–99)
GLUCOSE BLD-MCNC: 98 MG/DL (ref 65–99)

## 2017-10-23 PROCEDURE — A9270 NON-COVERED ITEM OR SERVICE: HCPCS | Performed by: INTERNAL MEDICINE

## 2017-10-23 PROCEDURE — 700102 HCHG RX REV CODE 250 W/ 637 OVERRIDE(OP): Performed by: INTERNAL MEDICINE

## 2017-10-23 PROCEDURE — 700101 HCHG RX REV CODE 250: Performed by: INTERNAL MEDICINE

## 2017-10-23 PROCEDURE — 770020 HCHG ROOM/CARE - TELE (206)

## 2017-10-23 PROCEDURE — 700111 HCHG RX REV CODE 636 W/ 250 OVERRIDE (IP): Performed by: INTERNAL MEDICINE

## 2017-10-23 PROCEDURE — 82962 GLUCOSE BLOOD TEST: CPT

## 2017-10-23 PROCEDURE — 99232 SBSQ HOSP IP/OBS MODERATE 35: CPT | Performed by: INTERNAL MEDICINE

## 2017-10-23 PROCEDURE — 700111 HCHG RX REV CODE 636 W/ 250 OVERRIDE (IP): Performed by: SURGERY

## 2017-10-23 RX ORDER — MORPHINE SULFATE 100 MG/5ML
5 SOLUTION ORAL
Status: DISCONTINUED | OUTPATIENT
Start: 2017-10-23 | End: 2017-10-24

## 2017-10-23 RX ORDER — MORPHINE SULFATE 15 MG/1
15 TABLET, FILM COATED, EXTENDED RELEASE ORAL 3 TIMES DAILY
Qty: 60 TAB | Refills: 0 | Status: SHIPPED | OUTPATIENT
Start: 2017-10-23 | End: 2017-11-03

## 2017-10-23 RX ORDER — MORPHINE SULFATE 10 MG/5ML
10 SOLUTION ORAL
Status: DISCONTINUED | OUTPATIENT
Start: 2017-10-23 | End: 2017-10-23

## 2017-10-23 RX ORDER — MORPHINE SULFATE 100 MG/5ML
5 SOLUTION ORAL
Qty: 30 ML | Refills: 0 | Status: SHIPPED | OUTPATIENT
Start: 2017-10-23 | End: 2017-11-03

## 2017-10-23 RX ADMIN — MORPHINE SULFATE 4 MG: 4 INJECTION INTRAVENOUS at 05:30

## 2017-10-23 RX ADMIN — ENOXAPARIN SODIUM 40 MG: 100 INJECTION SUBCUTANEOUS at 08:42

## 2017-10-23 RX ADMIN — MORPHINE SULFATE 5 MG: 100 SOLUTION ORAL at 22:56

## 2017-10-23 RX ADMIN — METOPROLOL TARTRATE 5 MG: 5 INJECTION INTRAVENOUS at 01:09

## 2017-10-23 RX ADMIN — FAMOTIDINE 20 MG: 10 INJECTION, SOLUTION INTRAVENOUS at 08:42

## 2017-10-23 RX ADMIN — MORPHINE SULFATE 4 MG: 4 INJECTION INTRAVENOUS at 08:42

## 2017-10-23 RX ADMIN — FAMOTIDINE 20 MG: 10 INJECTION, SOLUTION INTRAVENOUS at 20:04

## 2017-10-23 RX ADMIN — POTASSIUM CHLORIDE, DEXTROSE MONOHYDRATE AND SODIUM CHLORIDE: 150; 5; 900 INJECTION, SOLUTION INTRAVENOUS at 11:19

## 2017-10-23 RX ADMIN — ONDANSETRON 4 MG: 2 INJECTION INTRAMUSCULAR; INTRAVENOUS at 11:36

## 2017-10-23 RX ADMIN — MORPHINE SULFATE 5 MG: 100 SOLUTION ORAL at 20:04

## 2017-10-23 RX ADMIN — ONDANSETRON 4 MG: 2 INJECTION INTRAMUSCULAR; INTRAVENOUS at 20:04

## 2017-10-23 RX ADMIN — ONDANSETRON 4 MG: 2 INJECTION INTRAMUSCULAR; INTRAVENOUS at 05:27

## 2017-10-23 RX ADMIN — MORPHINE SULFATE 5 MG: 100 SOLUTION ORAL at 16:31

## 2017-10-23 RX ADMIN — ONDANSETRON 4 MG: 2 INJECTION INTRAMUSCULAR; INTRAVENOUS at 01:06

## 2017-10-23 RX ADMIN — MORPHINE SULFATE 4 MG: 4 INJECTION INTRAVENOUS at 11:48

## 2017-10-23 ASSESSMENT — PAIN SCALES - GENERAL
PAINLEVEL_OUTOF10: 5
PAINLEVEL_OUTOF10: 8
PAINLEVEL_OUTOF10: 5
PAINLEVEL_OUTOF10: 8
PAINLEVEL_OUTOF10: 6

## 2017-10-23 ASSESSMENT — ENCOUNTER SYMPTOMS
ABDOMINAL PAIN: 1
DEPRESSION: 0
ORTHOPNEA: 0
WEAKNESS: 1
BACK PAIN: 0
PALPITATIONS: 0
EYE DISCHARGE: 0
HEARTBURN: 0
NAUSEA: 1
NERVOUS/ANXIOUS: 0
CONSTIPATION: 1
FOCAL WEAKNESS: 0
FALLS: 0
WEIGHT LOSS: 0
DIARRHEA: 0
DIZZINESS: 0
SPUTUM PRODUCTION: 0
SEIZURES: 0
MYALGIAS: 0
BLURRED VISION: 0
COUGH: 0
EYE PAIN: 0
CHILLS: 0
NECK PAIN: 0
HEMOPTYSIS: 0
FEVER: 0
VOMITING: 1
HEADACHES: 0

## 2017-10-23 NOTE — PROGRESS NOTES
Brittany Schultz Fall Risk Assessment:     Last Known Fall: Within the last six months  Mobility: Immobilized/requires assist of one person  Medications: Cardiovascular and central nervous system meds  Mental Status/LOC/Awareness: Awake, alert, and oriented to date, place, and person  Toileting Needs: Use of catheters or diversion devices  Volume/Electrolyte Status: Use of IV fluids/tube feeds  Communication/Sensory: Visual (Glasses)/hearing deficit  Behavior: Appropriate behavior  Brittany Schultz Fall Risk Total: 13  Fall Risk Level: MODERATE RISK    Universal Fall Precautions:  call light/belongings in reach, bed in low position and locked, siderails up x 2, use non-slip footwear, adequate lighting, clutter free and spill free environment, educate on level of risk, educate to call for assistance    Fall Risk Level Interventions:    TRIAL (TELE 8, NEURO, MED JOHNATHON 5) Moderate Fall Risk Interventions  Place yellow fall risk ID band on patient: verified  Provide patient/family education based on risk assessment : completed  Educate patient/family to call staff for assistance when getting out of bed: completed  Place fall precaution signage outside patient door: verified  Utilize bed/chair fall alarm: verified     Patient Specific Interventions:     Medication: review medications with patient and family  Mental Status/LOC/Awareness: reinforce falls education, check on patient hourly, utilize bed/chair fall alarm and reinforce the use of call light  Toileting: provide frquent toileting, monitor intake and output/use of appropriate interventions, instruct patient/family on the use of grab bars, instruct patient/family on the need to call for assistance when toileting and do not leave patient unattended in bathroom/refer to toileting scripting  Volume/Electrolyte Status: ensure patient remains hydrated, monitor abnormal lab values and ensure IV fluids are appropriate  Communication/Sensory: update plan of care on whiteboard,  ensure proper positioning when transferrng/ambulating, ensure patient has glasses/contacts and hearing aids/dentures and for visually impaired patients orient to their room surrounding and do not change their surroundings  Behavioral: encourage patient to voice feelings, engage patient in daily activities, administer medication as ordered and instruct/reinforce fall program rationale  Mobility: utilize bed/chair fall alarm and ensure bed is locked and in lowest position

## 2017-10-23 NOTE — PROGRESS NOTES
Pt received from [facility] T-417 and brought to [place] [id number] via hospital bed. Pt care assumed. Pt switched to tele 8 box and monitor room notified- , name verified on monitor. Patient assessed. A&O x 4. Nausea and pain present. Call light, phone, and bedside table within reach. White board updated and plan of care discussed with patient. Bed alarm and strip alarm set and pt calls for assistance appropriately. Metoprolol PRN order in place, will administer per MAR. Will continue to monitor.

## 2017-10-23 NOTE — PROGRESS NOTES
2 RN skin assessment completed with Brandt RN: LUQ G-tube in place, laparotomy sites x 2. No s/sx of skin breakdown around G-tube site. Scab to medial metatarsal bone of L foot. Generalized swelling to lower extremities. Bottom red and blanching with noted healing skin tear present. Dressing in place over skin tear. No other skin abnormalities noted.

## 2017-10-23 NOTE — PALLIATIVE CARE
"PALLIATIVE CARE FOLLOW UP:  Introduced myself to family and discussed role of palliative care. Asked patient if she wanted to talk now or after family visits. She stated, \"that is why they are all here.\" Met with patient along with family including sister Madeleine (DPOA-HC), daughter Nicole (first alternate DPOA-HC), twin sister Cristina,  and his wife, granddaughter, and brother in law. Discussed goals of care. Patient expressed she can no longer do cancer clinical trial drug \"because I can't eat. But Dr. Sue was going to talk to the surgeon and come back. He's gonna talk about hospice.\"     Explained that I could provide information on hospice care. Hospice care education and discussed referral process. All questions answered. Patient expressed she would be going to live with her sister Madeleine who would care for her with the help of her daughter, brother-in-law, and other family. Provided hospice choice form to Madeleine with business card and asked she let me know if she was ready to talk with an agency.     Reviewed POLST and re-addressed code status. Patient confirmed she would want resuscitation measures but not want a feeding tube or dialysis. Education provided about requirement to be fed through a feeding tube while on a mechanical ventilator and discussed the inherent contradiction of two choices. Also gently stated that choosing resuscitation would require the patient revoke hospice and be brought back to the hospital, likely resulting in death in a facility. Also provided education on poor survival rates/QOL for patients who receive CPR with advanced cancer. Explained that it is 100% her choice but I want to ensure she is making an informed choice. Offered to provide time for patient to discuss with family. Left partially completed POLST and encouraged patient/family to write down questions and call with questions or if ready to complete.    Plan: [place] back regarding POLST/hospice choice/hospice " order once PC is contacted by family or tomorrow.    Updated: bedside RN.    Thank you for allowing Palliative Care to follow this patient. Please contact us at  with any questions.

## 2017-10-23 NOTE — CONSULTS
DATE OF SERVICE:  10/22/2017    REQUESTING PHYSICIAN:  James You MD    REASON FOR CONSULTATION:  Abdominal carcinomatosis.    HISTORY OF PRESENT ILLNESS:  The patient is a 62-year-old female that I have   known for a couple of years.  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  She had metastases to bilateral inguinal lymph nodes and presacral   nodes.  She completed combined modality radiation and chemotherapy with   mitomycin C and 5-FU on 08/30/2016.  She had significant problems with pain   and repeat imaging studies were negative until April of this year when she was   found to have new hypodense lesions in her liver.  These were biopsied and   found to be metastatic squamous cell carcinoma.  Interestingly, she was placed   on the [facility] clinical trial and was found to have an acquired BRCA2   mutation.  She was found to be a candidate for a new agent AZD 1775 which is a   WEE1 inhibitor similar to CDK 1 inhibitors.  This is an oral agent that she   was to take.  This was started in May of this year and she had very   significant GI toxicity and continued to have significant abdominal pain which   was usually well controlled with oxycodone.  After 2 cycles, she did have a   CT scan, which showed response with a decrease in the liver lesions.  However,   she continued to have abdominal discomfort.  Her last CT scan on 09/18/2017   of the chest, abdomen and pelvis showed some new areas of atelectasis in the   bilateral lower lobes, but has continued decrease in the size of the number of   hepatic metastases and stable enlargement of the right adrenal gland.  There   is some small ascites, but no other obvious abnormalities.  She developed   increasing abdominal pain over the past several weeks to the point where she   was admitted to the hospital on 10/12/2017.  She had a CT scan of her abdomen   again at that time, which  showed dilated proximal and mid small bowel with a   decompressed distal small bowel evidence for small-bowel obstruction.  The   ill-defined masses in the liver were seen, but poorly characterized on this   examination.  She did have diffuse wall thickening in the urinary bladder,   which was most likely related to her previous radiation.  There was no   evidence of any adenopathy.  She continued to have evidence of small-bowel   obstruction after NG tube suction for about a week and it was decided to   attempt possible resection.  She had a laparoscopic evaluation by Dr. Doll   on 10/20/2017 and this showed extensive intraabdominal carcinomatosis and   malignant obstruction with what is described as cemented right lower quadrant.    A gastrostomy drainage tube was placed.  She is getting pain medication   through the IV.  She has not had any bowel movements or passed any gas for   over a week.  Her pain is reasonably well controlled at present with the   intravenous medication.    PAST MEDICAL HISTORY:  She had left breast cancer 24 years ago treated with   mastectomy only, uterine and cervical cancer treated with hysterectomy 20   years ago, hypertension, type 2 diabetes, coronary artery disease, GERD,   osteoarthritis, hyperlipidemia, and squamous cell carcinoma of the anus with   intraabdominal liver and carcinomatous metastases.    FAMILY HISTORY:  Negative for breast cancer and ovarian cancers.    SOCIAL HISTORY:  The patient is  and lives with relative.  She has 5   children.  She does not drink alcohol and has not been a cigarette smoker.    CURRENT MEDICATIONS:  Vasotec, Lovenox, Pepcid, Reglan, Lopressor, MS Contin,   Zofran, pyrimidine, Dulcolax.    PHYSICAL EXAMINATION:  VITAL SIGNS:  Temperature is 97.5, pulse is 91, blood pressure is 105/65,   respirations are 18, O2 saturation 97%.  GENERAL:  Well-developed, well-nourished 62-year-old female who is presently   lying in bed postsurgical and  appears to be comfortable.  She has complete   alopecia secondary to her chemotherapy in the past.  Throat and mouth benign.  NECK:  Supple.  LYMPH NODES:  Not palpable in cervical, supraclavicular area.  LUNGS:  Clear.  HEART:  Regular rate and rhythm.  ABDOMEN:  She has postsurgical, although she has just had a laparoscopy.  She   has a new G-tube drainage in the left upper quadrant.  She is somewhat tender   throughout, but not obviously distended.  I could not actually feel any   organomegaly or masses.  EXTREMITIES:  She has got about 2+ pedal edema bilaterally.    IMPRESSION:  1.  Squamous cell carcinoma of the anus initially stage IIIB disease treated   with standard radiation and chemotherapy 1 year ago, but relapsing 6 months   later with extensive hepatic metastases.  2.  Responded to investigational agent on the [facility] trial with AZD 1775   showing decrease in liver metastases over approximately 4 months.  3.  Complete bowel obstruction secondary to abdominal carcinomatosis from   squamous cell carcinoma.  4.  Nausea and vomiting and no bowel movement secondary to obstruction.  5.  Chronic pain with inability to take oral medication due to complete   obstruction and utilizing her port for pain control.    PLAN:  I discussed with the patient and her sister that her disease was now   too advance for us to consider any treatment option that would be beneficial.    She clearly is a candidate for palliative care, but also hospice.  I   discussed hospice with them in which she would hopefully be able to go home   with drainage and help.  The major issue will be pain control since she will   not be able to take anything orally.  We were trying to treat her with a   fentanyl patch at _____on several occasions, but her insurance would not pay   for this.  It is possible to treat her perhaps with some subcutaneous   medications and whether a pain pump on a home basis is a possible   consideration.  I expressed  that her long-term survival was only a matter of   few months at best and certainly less than 6 months.  I think they understand   the situation well and are very agreeable to considering and starting with   hospice and palliative care with the idea of hopefully eventually going home   under family care if possible.       ____________________________________     MD JULIUS CARRANZA / KAILASH    DD:  10/22/2017 16:01:47  DT:  10/22/2017 16:59:46    D#:  [id number]  Job#:  [id number]

## 2017-10-23 NOTE — PALLIATIVE CARE
PALLIATIVE CARE FOLLOW UP:  Discussed duplicate order for pain management with Dr. Rosa.     Thank you for allowing Palliative Care to follow this patient. Please contact us at  with any questions.

## 2017-10-23 NOTE — PROGRESS NOTES
Bedside report received.  Assessment complete.  A&O x 4. Patient calls appropriately.  Denies numbness and tingling. Moves all extremities.   Patient up with one assist.   Patient has 6/10 pain. Repositioned.   Pt is positive for nausea and vomiting, medication providedt.  Lap sites noted, G tube noted.  Ivan in place to void, denies flatus  Patient denies SOB.  SCD's on.  Review plan with of care with patient. Call light and personal belongings with in reach. Hourly rounding in place. All needs met at this time.

## 2017-10-23 NOTE — CARE PLAN
Problem: Safety  Goal: Will remain free from injury  Outcome: PROGRESSING AS EXPECTED  Bed low and locked with call light and personal belongings within reach.  Patient instructed to call for assistance.  Verbalized understanding.    Problem: Knowledge Deficit  Goal: Knowledge of disease process/condition, treatment plan, diagnostic tests, and medications will improve  Outcome: PROGRESSING AS EXPECTED  Patient updated on today's plan of care.

## 2017-10-23 NOTE — DISCHARGE PLANNING
Lupe with [facility] ([phone]) met with pt. Per Lupe, wants to ask MD if pt can be on liquid roxlonol 20 mg/1ml and she will contact sister, Madeleine, for DME. Hospitalist RN gave MD Rajiv Andrade's number to contact.

## 2017-10-23 NOTE — PROGRESS NOTES
62 yr F, admitted for SBO. Patient is tachycardic and unable to tolerate po medications due to nausea. Will transfer to tele to administer iv metoprolol prn for HR control.

## 2017-10-23 NOTE — PALLIATIVE CARE
"PALLIATIVE CARE FOLLOW UP:  Received call that family had questions regarding POLST. All questions answered. Patient wants CPR attempted, she wants CMO with hospice discharge, IV antibiotics, no feeding tube. Discussed contradiction of choices but patient confirmed that is what she chooses \"and then I'll leave it up to family.\" Patient chose [facility] related to previous experience. Explained I would discuss POLST for completion with Dr. Vance as well as hospice referral order. Educated patient/family that POLST can be redone at any time if patient's wishes change. Patient/family encouraged to call with any questions, needs or concerns.     Updated: Dr. Vance and requested review/signature of POLST and hospice referral. Discussed above which he states is consistent with his conversation. Patient does have the autonomy to be a full code while on hospice.     Thank you for allowing Palliative Care to follow this patient. Please contact us at  with any questions.   "

## 2017-10-23 NOTE — PALLIATIVE CARE
PALLIATIVE CARE FOLLOW UP:  Hospice choice form faxed to [facility] at #[phone]. Messages left for both T8 covering SW.     Thank you for allowing Palliative Care to follow this patient. Please contact us at  with any questions.

## 2017-10-23 NOTE — CARE PLAN
Problem: Safety  Goal: Will remain free from injury  Outcome: PROGRESSING AS EXPECTED  Pt remains free from falls or injuries. Bed alarm set and pt calls for assistance appropriately.     Problem: Venous Thromboembolism (VTW)/Deep Vein Thrombosis (DVT) Prevention:  Goal: Patient will participate in Venous Thrombosis (VTE)/Deep Vein Thrombosis (DVT)Prevention Measures  Outcome: PROGRESSING AS EXPECTED  Pt free from s/sx of DVT. Pt using SCDs and receiving Lovenox for VTE prophylaxis.

## 2017-10-23 NOTE — PROGRESS NOTES
Monitor summary: SR-ST , WA 16, QRS 12, QT 36, with occassional PACs and BBB per strip from monitor room.

## 2017-10-24 LAB
GLUCOSE BLD-MCNC: 104 MG/DL (ref 65–99)
GLUCOSE BLD-MCNC: 90 MG/DL (ref 65–99)

## 2017-10-24 PROCEDURE — 700102 HCHG RX REV CODE 250 W/ 637 OVERRIDE(OP): Performed by: INTERNAL MEDICINE

## 2017-10-24 PROCEDURE — 99232 SBSQ HOSP IP/OBS MODERATE 35: CPT | Performed by: INTERNAL MEDICINE

## 2017-10-24 PROCEDURE — A9270 NON-COVERED ITEM OR SERVICE: HCPCS | Performed by: INTERNAL MEDICINE

## 2017-10-24 PROCEDURE — 700111 HCHG RX REV CODE 636 W/ 250 OVERRIDE (IP): Performed by: INTERNAL MEDICINE

## 2017-10-24 PROCEDURE — 770020 HCHG ROOM/CARE - TELE (206)

## 2017-10-24 PROCEDURE — 700111 HCHG RX REV CODE 636 W/ 250 OVERRIDE (IP): Performed by: SURGERY

## 2017-10-24 PROCEDURE — 82962 GLUCOSE BLOOD TEST: CPT

## 2017-10-24 PROCEDURE — 700101 HCHG RX REV CODE 250: Performed by: INTERNAL MEDICINE

## 2017-10-24 RX ORDER — MORPHINE SULFATE 4 MG/ML
4 INJECTION, SOLUTION INTRAMUSCULAR; INTRAVENOUS EVERY 6 HOURS PRN
Status: DISCONTINUED | OUTPATIENT
Start: 2017-10-24 | End: 2017-11-04 | Stop reason: HOSPADM

## 2017-10-24 RX ORDER — MORPHINE SULFATE 100 MG/5ML
10 SOLUTION ORAL
Status: DISCONTINUED | OUTPATIENT
Start: 2017-10-24 | End: 2017-11-01

## 2017-10-24 RX ADMIN — MORPHINE SULFATE 10 MG: 100 SOLUTION ORAL at 19:41

## 2017-10-24 RX ADMIN — FAMOTIDINE 20 MG: 10 INJECTION, SOLUTION INTRAVENOUS at 21:26

## 2017-10-24 RX ADMIN — POTASSIUM CHLORIDE, DEXTROSE MONOHYDRATE AND SODIUM CHLORIDE: 150; 5; 900 INJECTION, SOLUTION INTRAVENOUS at 05:46

## 2017-10-24 RX ADMIN — MORPHINE SULFATE 5 MG: 100 SOLUTION ORAL at 05:14

## 2017-10-24 RX ADMIN — MORPHINE SULFATE 10 MG: 100 SOLUTION ORAL at 13:12

## 2017-10-24 RX ADMIN — MORPHINE SULFATE 4 MG: 4 INJECTION INTRAVENOUS at 22:18

## 2017-10-24 RX ADMIN — MORPHINE SULFATE 10 MG: 100 SOLUTION ORAL at 22:57

## 2017-10-24 RX ADMIN — ENOXAPARIN SODIUM 40 MG: 100 INJECTION SUBCUTANEOUS at 08:21

## 2017-10-24 RX ADMIN — MORPHINE SULFATE 5 MG: 100 SOLUTION ORAL at 08:21

## 2017-10-24 RX ADMIN — MORPHINE SULFATE 5 MG: 100 SOLUTION ORAL at 02:08

## 2017-10-24 RX ADMIN — MORPHINE SULFATE 4 MG: 4 INJECTION INTRAVENOUS at 15:01

## 2017-10-24 RX ADMIN — FAMOTIDINE 20 MG: 10 INJECTION, SOLUTION INTRAVENOUS at 08:21

## 2017-10-24 RX ADMIN — MORPHINE SULFATE 4 MG: 4 INJECTION INTRAVENOUS at 05:42

## 2017-10-24 RX ADMIN — MORPHINE SULFATE 4 MG: 4 INJECTION INTRAVENOUS at 10:01

## 2017-10-24 RX ADMIN — MORPHINE SULFATE 10 MG: 100 SOLUTION ORAL at 16:22

## 2017-10-24 RX ADMIN — MORPHINE SULFATE 15 MG: 15 TABLET, FILM COATED, EXTENDED RELEASE ORAL at 21:21

## 2017-10-24 ASSESSMENT — ENCOUNTER SYMPTOMS
EYE DISCHARGE: 0
DEPRESSION: 0
BLURRED VISION: 0
NERVOUS/ANXIOUS: 0
HEMOPTYSIS: 0
DIARRHEA: 0
EYE PAIN: 0
DIZZINESS: 0
SEIZURES: 0
NAUSEA: 1
WEIGHT LOSS: 0
SPUTUM PRODUCTION: 0
HEADACHES: 0
CHILLS: 0
COUGH: 0
MYALGIAS: 0
FALLS: 0
CONSTIPATION: 1
WEAKNESS: 1
ORTHOPNEA: 0
ABDOMINAL PAIN: 1
NECK PAIN: 0
FEVER: 0
FOCAL WEAKNESS: 0
HEARTBURN: 0
PALPITATIONS: 0
VOMITING: 1
BACK PAIN: 0

## 2017-10-24 ASSESSMENT — PAIN SCALES - GENERAL
PAINLEVEL_OUTOF10: 9
PAINLEVEL_OUTOF10: 10
PAINLEVEL_OUTOF10: 7
PAINLEVEL_OUTOF10: 9
PAINLEVEL_OUTOF10: 7
PAINLEVEL_OUTOF10: 4
PAINLEVEL_OUTOF10: 7
PAINLEVEL_OUTOF10: 5
PAINLEVEL_OUTOF10: 7

## 2017-10-24 NOTE — PALLIATIVE CARE
PALLIATIVE CARE FOLLOW UP:  POLST not reviewed completed by MD yesterday. Call placed to Dr. Wright and discussed/requested review and completion. POLST on hard chart.    Thank you for allowing Palliative Care to follow this patient. Please contact us at  with any questions.

## 2017-10-24 NOTE — PROGRESS NOTES
[facility] Hospitalist Progress Note    Date of Service: 10/24/2017    Chief Complaint  62 y.o. female admitted 10/12/2017 with abdominal pain.    Interval Problem Update  Pt seen and examined, still with some abdominal pain, nop acute events overnight, pending hospice arrangements.     Consultants/Specialty  Surgery-Dr. Doll    Disposition  Patient requires additional treatment in the hospital        Review of Systems   Constitutional: Positive for malaise/fatigue. Negative for chills, fever and weight loss.   HENT: Negative for congestion, hearing loss and nosebleeds.    Eyes: Negative for blurred vision, pain and discharge.   Respiratory: Negative for cough, hemoptysis and sputum production.    Cardiovascular: Negative for chest pain, palpitations and orthopnea.   Gastrointestinal: Positive for abdominal pain, constipation, nausea and vomiting. Negative for diarrhea and heartburn.   Genitourinary: Negative for dysuria and urgency.   Musculoskeletal: Positive for joint pain. Negative for back pain, falls, myalgias and neck pain.   Skin: Negative for itching and rash.   Neurological: Positive for weakness. Negative for dizziness, focal weakness, seizures and headaches.   Psychiatric/Behavioral: Negative for depression and suicidal ideas. The patient is not nervous/anxious.       Physical Exam  Laboratory/Imaging   Hemodynamics  Temp (24hrs), Av.6 °C (97.9 °F), Min:36.2 °C (97.1 °F), Max:37.4 °C (99.3 °F)   Temperature: 36.2 °C (97.1 °F)  Pulse  Av.7  Min: 75  Max: 142    Blood Pressure: 117/72      Respiratory      Respiration: 18, Pulse Oximetry: 95 %        RUL Breath Sounds: Diminished, RML Breath Sounds: Diminished, RLL Breath Sounds: Diminished, MERE Breath Sounds: Diminished, LLL Breath Sounds: Diminished    Fluids    Intake/Output Summary (Last 24 hours) at 10/24/17 1530  Last data filed at 10/24/17 1200   Gross per 24 hour   Intake              345 ml   Output             2255 ml   Net             -1910 ml       Nutrition  Orders Placed This Encounter   Procedures   • DIET ORDER     Standing Status:   Standing     Number of Occurrences:   1     Order Specific Question:   Diet:     Answer:   Clear Liquids - No Red Foods [12]     Physical Exam   Constitutional: She is oriented to person, place, and time. No distress.   HENT:   Head: Normocephalic and atraumatic.   Left Ear: External ear normal.   Eyes: Conjunctivae and EOM are normal. Pupils are equal, round, and reactive to light. Right eye exhibits no discharge.   Neck: Normal range of motion. No thyromegaly present.   Cardiovascular: Normal rate and regular rhythm.    Murmur heard.  Pulmonary/Chest: Effort normal and breath sounds normal. No respiratory distress. She has no wheezes.   Abdominal: Soft. Bowel sounds are normal. She exhibits distension. There is tenderness. There is no rebound.   Musculoskeletal: She exhibits no edema.   Neurological: She is alert and oriented to person, place, and time. No cranial nerve deficit.   Skin: Skin is warm and dry. She is not diaphoretic.   Nursing note and vitals reviewed.                               Assessment/Plan     SBO (small bowel obstruction)- (present on admission)   Assessment & Plan    - history of multiple surgeries in the past and radiation for her rectal cancer  - post exploratory lap: 10/20: carcinomatosis with extensive adhesions  - oral diet as for comfort  - poor prognosis  - seems to be worsening, no much drainage from the gastrostomy tube. After iv flush, some yellowish output.  Discussed with general surgeon.  Nothing more can be done.  - monitor closely for acute abdomen          Anal carcinoma (CMS-HCC)- (present on admission)   Assessment & Plan    - Chronic, palliative care following.        Chronic pain   Assessment & Plan    Cont  oral morphine in anticipation for hospice.  Continue ms contin for sustained pain control.        Normocytic anemia- (present on admission)   Assessment & Plan     - Chronic, no sign of gross bleeding        Rectal cancer (CMS-HCC)- (present on admission)   Assessment & Plan    - Patient is on experimental drug as an outpatient, continue outpatient follow-up  - Dr. Sue states that the patient is a candidate for hospice  -- thus hospice referral made.             Reviewed items::  Labs reviewed, Medications reviewed and Radiology images reviewed  DVT prophylaxis pharmacological::  Enoxaparin (Lovenox)  Antibiotics:  Treating active infection/contamination beyond 24 hours perioperative coverage

## 2017-10-24 NOTE — DISCHARGE PLANNING
Late Entry from 10/19/17 - Received referral to see patient re: Advance Directives along with Certificate of Competency from [facility], Palliative Care. Met with patient - answered questions, completed document and notarized. Will scan into Epic. LUCY Foote - [phone]

## 2017-10-24 NOTE — DISCHARGE PLANNING
Received call from [facility] requesting copy of insurance card. Faxed to [facility] at [phone].

## 2017-10-24 NOTE — PROGRESS NOTES
Brittany Schultz Fall Risk Assessment:     Last Known Fall: Within the last six months  Mobility: Immobilized/requires assist of one person  Medications: Cardiovascular or central nervous system meds  Mental Status/LOC/Awareness: Awake, alert, and oriented to date, place, and person  Toileting Needs: Use of catheters or diversion devices, Incontinence  Volume/Electrolyte Status: Use of IV fluids/tube feeds  Communication/Sensory: Visual (Glasses)/hearing deficit  Behavior: Appropriate behavior  Brittany Schultz Fall Risk Total: 15  Fall Risk Level: HIGH RISK    Universal Fall Precautions:  call light/belongings in reach, bed in low position and locked, wheelchairs and assistive devices out of sight, siderails up x 2, use non-slip footwear, adequate lighting, clutter free and spill free environment    Fall Risk Level Interventions:    TRIAL (Parature, NEURO, MED JOHNATHON 5) Moderate Fall Risk Interventions  Place yellow fall risk ID band on patient: verified  Provide patient/family education based on risk assessment : completed  Educate patient/family to call staff for assistance when getting out of bed: completed  Place fall precaution signage outside patient door: verified  Utilize bed/chair fall alarm: verifiedTRIAL ([employer], NEURO, MED JOHNATHON 5) High Fall Risk Interventions  Place yellow fall risk ID band on patient: verified  Provide patient/family education based on risk assessment: completed  Educate patient/family to call staff for assistance when getting out of bed: completed  Place fall precaution signage outside patient door: verified  Place patient in room close to nursing station: verified  Utilize bed/chair fall alarm: completed  Notify charge of high risk for huddle: completed    Patient Specific Interventions:     Medication: review medications with patient and family and limit combination of prn medications  Mental Status/LOC/Awareness: not applicable  Toileting: monitor intake and output/use of appropriate  interventions, instruct patient/family on the use of grab bars and instruct patient/family on the need to call for assistance when toileting  Volume/Electrolyte Status: ensure patient remains hydrated, monitor blood sugars and maintain appropriate blood sugar levels if diabetic, administer medications as ordered for nausea and vomiting, monitor abnormal lab values and ensure IV fluids are appropriate  Communication/Sensory: update plan of care on whiteboard and ensure patient has glasses/contacts and hearing aids/dentures  Behavioral: encourage patient to voice feelings and engage patient in daily activities  Mobility: utilize bed/chair fall alarm and ensure bed is locked and in lowest position

## 2017-10-24 NOTE — PROGRESS NOTES
[facility] Hospitalist Progress Note    Date of Service: 10/23/2017    Chief Complaint  62 y.o. female admitted 10/12/2017 with abdominal pain.    Interval Problem Update  10/23:  The patient still reports of having nausea, vomiting, abdominal distension. Not much drainage from the gastrostomy tube.    Consultants/Specialty  Surgery-Dr. Doll    Disposition  Patient requires additional treatment in the hospital        Review of Systems   Constitutional: Positive for malaise/fatigue. Negative for chills, fever and weight loss.   HENT: Negative for congestion, hearing loss and nosebleeds.    Eyes: Negative for blurred vision, pain and discharge.   Respiratory: Negative for cough, hemoptysis and sputum production.    Cardiovascular: Negative for chest pain, palpitations and orthopnea.   Gastrointestinal: Positive for abdominal pain, constipation, nausea and vomiting. Negative for diarrhea and heartburn.   Genitourinary: Negative for dysuria and urgency.   Musculoskeletal: Positive for joint pain. Negative for back pain, falls, myalgias and neck pain.   Skin: Negative for itching and rash.   Neurological: Positive for weakness. Negative for dizziness, focal weakness, seizures and headaches.   Psychiatric/Behavioral: Negative for depression and suicidal ideas. The patient is not nervous/anxious.       Physical Exam  Laboratory/Imaging   Hemodynamics  Temp (24hrs), Av.8 °C (98.3 °F), Min:36.3 °C (97.3 °F), Max:37.5 °C (99.5 °F)   Temperature: 36.3 °C (97.3 °F)  Pulse  Av.9  Min: 75  Max: 142    Blood Pressure: 110/75      Respiratory      Respiration: 18, Pulse Oximetry: 96 %        RUL Breath Sounds: Diminished, RML Breath Sounds: Diminished, RLL Breath Sounds: Diminished, MERE Breath Sounds: Diminished, LLL Breath Sounds: Diminished    Fluids    Intake/Output Summary (Last 24 hours) at 10/23/17 1818  Last data filed at 10/23/17 1800   Gross per 24 hour   Intake             2970 ml   Output             2235 ml   Net               735 ml       Nutrition  Orders Placed This Encounter   Procedures   • DIET ORDER     Standing Status:   Standing     Number of Occurrences:   1     Order Specific Question:   Diet:     Answer:   Clear Liquids - No Red Foods [12]     Physical Exam   Constitutional: She is oriented to person, place, and time. No distress.   HENT:   Head: Normocephalic and atraumatic.   Left Ear: External ear normal.   Eyes: Conjunctivae and EOM are normal. Pupils are equal, round, and reactive to light. Right eye exhibits no discharge.   Neck: Normal range of motion. No thyromegaly present.   Cardiovascular: Normal rate and regular rhythm.    Murmur heard.  Pulmonary/Chest: Effort normal and breath sounds normal. No respiratory distress. She has no wheezes.   Abdominal: Soft. Bowel sounds are normal. She exhibits distension. There is tenderness. There is no rebound.   Musculoskeletal: She exhibits no edema.   Neurological: She is alert and oriented to person, place, and time. No cranial nerve deficit.   Skin: Skin is warm and dry. She is not diaphoretic.   Nursing note and vitals reviewed.      Recent Labs      10/21/17   0310   WBC  10.8   RBC  3.10*   HEMOGLOBIN  8.9*   HEMATOCRIT  28.4*   MCV  91.6   MCH  28.7   MCHC  31.3*   RDW  53.9*   PLATELETCT  367   MPV  9.5     Recent Labs      10/21/17   0310   SODIUM  140   POTASSIUM  4.2   CHLORIDE  105   CO2  27   GLUCOSE  113*   BUN  6*   CREATININE  0.48*   CALCIUM  8.0*                      Assessment/Plan     SBO (small bowel obstruction)- (present on admission)   Assessment & Plan    - history of multiple surgeries in the past and radiation for her rectal cancer  - post exploratory lap: 10/20: carcinomatosis with extensive adhesions  - oral diet as for comfort  - poor prognosis  - seems to be worsening, no much drainage from the gastrostomy tube. After iv flush, some yellowish output.  Discussed with general surgeon.  Nothing more can be done.  - monitor closely for  acute abdomen          Anal carcinoma (CMS-HCC)- (present on admission)   Assessment & Plan    - Chronic, palliative care following.        Chronic pain   Assessment & Plan    Transition to oral morphine in anticipation for hospice.  Continue ms contin for sustained pain control.        Normocytic anemia- (present on admission)   Assessment & Plan    - Chronic, no sign of gross bleeding        Rectal cancer (CMS-HCC)- (present on admission)   Assessment & Plan    - Patient is on experimental drug as an outpatient, continue outpatient follow-up  - Dr. Sue states that the patient is a candidate for hospice  -- thus hospice referral made.             Reviewed items::  Labs reviewed, Medications reviewed and Radiology images reviewed  DVT prophylaxis pharmacological::  Enoxaparin (Lovenox)  Antibiotics:  Treating active infection/contamination beyond 24 hours perioperative coverage

## 2017-10-24 NOTE — DISCHARGE PLANNING
Received choice form from [facility] for hospice. Referral sent to [facility] at 1123. Notified Crow at [facility] of physical address in [place].

## 2017-10-24 NOTE — DISCHARGE SUMMARY
Please note that this is an interim discharge summary.    PATIENT'S CURRENT DIAGNOSES INCLUDING THE FOLLOWIN.  Abdominal carcinomatosis.   2. 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  3.  Chronic pain.  4.  Nausea, vomiting.  5.  The patient also has sepsis secondary to urinary tract infections.  6.  Chest pain, which is due to gastrointestinal dyspepsia, possibly.  7.  Hyponatremia, resolved.  8.  Chronic normocytic anemia.  9.  Hypokalemia, resolved.      CONSULTANT ON THIS CASE:  Including;  1.  Hematology/oncologist, Dr. Alfa Sue.  2.  General surgeon, Dr. Galindo Doll.  3.  Palliative care, Casi Goldstein.    PERTINENT PROCEDURES HERE:  Please note that patient went to surgery on   10/20/2017.  Specifically the procedure performed was laparoscopy and   gastrostomy with laparoscopic method.    Please note the postop finding including carcinomatosis, and the patient has   also malignant obstruction in right lower quadrants.    SUMMARY OF HOSPITAL COURSE:  Patient is a 62-year-old -American female   who came in the hospital with abdominal pain that being ongoing for 2 days   prior to coming to the hospital.  The patient has a history of a breast,   cervical and rectal cancer with mets to the liver and currently was on   clinical trial chemo, and patient's here had a CT abdomen and pelvis on   10/12/2017 and the finding of this showed the patient had dilated proximal and   mid small bowel with slightly compressed distal small bowel consistent with   small-bowel obstructions.  Patient also had some abdominal and pelvic ascites,   and patient demonstrated ill-defined mass in the liver and patient had   diffuse wall thickening of the urinary bladder nonspecific and could be   related to infection and inflammation and please note that patient also had a   small bowel  series that was performed on 10/13/2017 and the finding that shows   the contrast reaches the colon within 1-1/2 hour, but does shows no evidence   of small-bowel obstruction however.  The patient was taken to the operating   room and that showed the patient has a carcinomatosis and gastrostomy tube was   put in place for drainage and comfort reasons.  Two blood cultures collected   with both were negative, and there was a urine culture that was collected that   was reportedly negative, however, urinalysis indicated large leukocyte   esterase and also 10-20 wbc's and with presence of bacteria.      Please note that patient was treated for sepsis due to UTI and patient was on   ceftriaxone and eventually the sepsis resolved.      Please note that Dr. Galindo Doll stated that the patient is hospice candidate   and no more surgery would be done on her and patient was then evaluated by   Dr. Alfa Sue, hematology/oncologist, and per Dr. Alfa Sue, patient's   squamous cell carcinoma of the anus, initially stage IIIB, treated with   standard radiation and chemotherapy a year ago then relapsed 6 months later   with extensive hepatic metastasis and it did responded to investigational   agent showing decreased liver mets over approximately 4 months, but then now   the patient has a complete bowel obstruction secondary abdominal   carcinomatosis from a squamous cell carcinoma and as far as his statement that   patient's disease is too advanced for considered further treatment options   and the patient is a candidate for palliative care and also hospice.  With   this in mind, patient will have hospice eval and patient will be continued on   pain treatment.  Please note, patient's code status still full code.    DISCHARGE CONDITION:  Stable.    DISPOSITION:  Post-discharge, likely home with hospice care.    ACTIVITY:  As tolerated.    DIET:  Anything for comfort.    MEDICATIONS:  Patient is on the following medications,  this including the   following currently:  The patient will continue on morphine extended release   tablets 50 mg 1 tablet by mouth 3 times a day, morphine liquid oral solutions   20 mg/mL concentration, patient to take 0.25 mL, which is essentially   equivalent to a 5 mg dose every 3 hours needed for breakthrough pain and   patient also has aspirin 81 mg daily, and patient also has Zofran 4 mg 1   tablet by mouth every 4 hours as needed for nausea and vomiting, Lipitor 20 mg   1 tablet at bedtime.  The patient is on carvedilol 25 mg 1 tablet twice daily   at home and this may be continued and patient on Protonix 40 mg 1 tablet   daily.    FOLLOWUP INSTRUCTIONS:  The patient will continue follow up by hospice staff   physician.       ____________________________________     DO LAUREN Morel / KAILASH    DD:  10/23/2017 18:34:29  DT:  10/24/2017 02:12:35    D#:  [id number]  Job#:  [id number]

## 2017-10-24 NOTE — DISCHARGE PLANNING
Lupe with [facility] spoke with CR. Per Lupe, [facility] RN, whom attends to [place] patients, is having emergency surgery. Lupe notified pt that referral will have to be sent to [facility] in [place] service area.    CR met with pt at bedside. Pt stated Lupe informed her about [facility] and she is fine with referral to [facility]. CR asked pt if she is staying in [place] or [place] ([place] address on pt's facesheet). Per pt, she will be staying with her sister in [place] and address is [address] [place] [address], [place] [address].    CR faxed choice form to [facility].

## 2017-10-24 NOTE — CARE PLAN
Problem: Nutritional:  Goal: Achieve adequate nutritional intake  Patient will start diet and consume >50% of meals   Outcome: PROGRESSING SLOWER THAN EXPECTED  Advance diet as appropriate with plan of care

## 2017-10-24 NOTE — CARE PLAN
Problem: Safety  Goal: Will remain free from injury  Outcome: PROGRESSING AS EXPECTED  Brittany Schultz Fall Risk Assessment:     Last Known Fall: Within the last six months  Mobility: Dizziness/generalized weakness, Immobilized/requires assist of one person  Medications: Cardiovascular or central nervous system meds  Mental Status/LOC/Awareness: Awake, alert, and oriented to date, place, and person  Toileting Needs: Use of catheters or diversion devices  Volume/Electrolyte Status: Use of IV fluids/tube feeds, Nausea/vomiting  Communication/Sensory: Visual (Glasses)/hearing deficit  Behavior: Appropriate behavior  Brittany Schultz Fall Risk Total: 16  Fall Risk Level: HIGH RISK    Universal Fall Precautions:  call light/belongings in reach, bed in low position and locked, siderails up x 2, use non-slip footwear, adequate lighting, clutter free and spill free environment, educate on level of risk, educate to call for assistance    Fall Risk Level Interventions:    TRIAL (TELE 8, NEURO, MED JOHNATHON 5) Moderate Fall Risk Interventions  Place yellow fall risk ID band on patient: verified  Provide patient/family education based on risk assessment : completed  Educate patient/family to call staff for assistance when getting out of bed: completed  Place fall precaution signage outside patient door: verified  Utilize bed/chair fall alarm: verifiedTRIAL (TELE 8, NEURO, hhgregg JOHNATHON 5) High Fall Risk Interventions  Place yellow fall risk ID band on patient: verified  Provide patient/family education based on risk assessment: completed  Educate patient/family to call staff for assistance when getting out of bed: completed  Place fall precaution signage outside patient door: verified  Place patient in room close to nursing station: verified  Utilize bed/chair fall alarm: completed  Notify charge of high risk for huddle: completed    Patient Specific Interventions:     Medication: review medications with patient and family and assess for  medications that can be discontinued or dosage decreased  Mental Status/LOC/Awareness: reinforce falls education, check on patient hourly, utilize bed/chair fall alarm and reinforce the use of call light  Toileting: monitor intake and output/use of appropriate interventions and instruct patient/family on the need to call for assistance when toileting  Volume/Electrolyte Status: ensure patient remains hydrated, monitor blood sugars and maintain appropriate blood sugar levels if diabetic, administer medications as ordered for nausea and vomiting, monitor abnormal lab values and ensure IV fluids are appropriate  Communication/Sensory: update plan of care on whiteboard, ensure patient has glasses/contacts and hearing aids/dentures and for visually impaired patients orient to their room surrounding and do not change their surroundings  Behavioral: encourage patient to voice feelings, administer medication as ordered and instruct/reinforce fall program rationale  Mobility: utilize bed/chair fall alarm and ensure bed is locked and in lowest position    Problem: Knowledge Deficit  Goal: Knowledge of disease process/condition, treatment plan, diagnostic tests, and medications will improve  Outcome: PROGRESSING AS EXPECTED  Plan of care discussed and reviewed with pt, pt to be discharged on today, home with hospice, pt verbalized understanding.    Problem: Pain Management  Goal: Pain level will decrease to patient's comfort goal  Outcome: PROGRESSING AS EXPECTED  Pt medicated with PRN Roxanol q3hrs thus far. Pain worsens with movement/repositioning in bed. Pain otherwise controlled, pain management therapy ongoing.

## 2017-10-24 NOTE — DIETARY
Nutrition: Day 12 of admit.  63 yo female admitted with small bowel obstruction.  She has a history of multiple surgeries secondary to rectal cancer.  Poor nutritional intake PTA noted on admitting screen.  Pt was on a regular diet taking 25-75% of meals prior to having laparoscopic g-tube placement on 10/20.  She has now been on a clear liquid no red foods diet since 10/21. MD notes yesterday that pt still c/o nausea, vomiting and abdominal distention.  Pt is followed by Dr. Sue who stated pt is a candidate for hospice. Pt has now been accepted into [facility].      Recommendations/Plan:  1) advance diet per pt tolerance and preferences  2) consider use of g-tube for supplemental nutrition in accordance with pt/family wishes for plan of care

## 2017-10-25 LAB — GLUCOSE BLD-MCNC: 90 MG/DL (ref 65–99)

## 2017-10-25 PROCEDURE — 700111 HCHG RX REV CODE 636 W/ 250 OVERRIDE (IP): Performed by: INTERNAL MEDICINE

## 2017-10-25 PROCEDURE — 700111 HCHG RX REV CODE 636 W/ 250 OVERRIDE (IP): Performed by: SURGERY

## 2017-10-25 PROCEDURE — 700101 HCHG RX REV CODE 250: Performed by: INTERNAL MEDICINE

## 2017-10-25 PROCEDURE — 82962 GLUCOSE BLOOD TEST: CPT

## 2017-10-25 PROCEDURE — 700102 HCHG RX REV CODE 250 W/ 637 OVERRIDE(OP): Performed by: INTERNAL MEDICINE

## 2017-10-25 PROCEDURE — 770020 HCHG ROOM/CARE - TELE (206)

## 2017-10-25 PROCEDURE — A9270 NON-COVERED ITEM OR SERVICE: HCPCS | Performed by: INTERNAL MEDICINE

## 2017-10-25 PROCEDURE — 99232 SBSQ HOSP IP/OBS MODERATE 35: CPT | Performed by: INTERNAL MEDICINE

## 2017-10-25 RX ADMIN — ONDANSETRON 4 MG: 4 TABLET, ORALLY DISINTEGRATING ORAL at 12:21

## 2017-10-25 RX ADMIN — ONDANSETRON 4 MG: 2 INJECTION INTRAMUSCULAR; INTRAVENOUS at 08:25

## 2017-10-25 RX ADMIN — MORPHINE SULFATE 4 MG: 4 INJECTION INTRAVENOUS at 10:21

## 2017-10-25 RX ADMIN — MORPHINE SULFATE 10 MG: 100 SOLUTION ORAL at 08:22

## 2017-10-25 RX ADMIN — FAMOTIDINE 20 MG: 10 INJECTION, SOLUTION INTRAVENOUS at 20:31

## 2017-10-25 RX ADMIN — ENOXAPARIN SODIUM 40 MG: 100 INJECTION SUBCUTANEOUS at 08:26

## 2017-10-25 RX ADMIN — POTASSIUM CHLORIDE, DEXTROSE MONOHYDRATE AND SODIUM CHLORIDE: 150; 5; 900 INJECTION, SOLUTION INTRAVENOUS at 20:47

## 2017-10-25 RX ADMIN — ONDANSETRON 4 MG: 4 TABLET, ORALLY DISINTEGRATING ORAL at 23:33

## 2017-10-25 RX ADMIN — MORPHINE SULFATE 4 MG: 4 INJECTION INTRAVENOUS at 23:33

## 2017-10-25 RX ADMIN — MORPHINE SULFATE 10 MG: 100 SOLUTION ORAL at 05:04

## 2017-10-25 RX ADMIN — MORPHINE SULFATE 10 MG: 100 SOLUTION ORAL at 21:04

## 2017-10-25 RX ADMIN — MORPHINE SULFATE 10 MG: 100 SOLUTION ORAL at 02:03

## 2017-10-25 RX ADMIN — MORPHINE SULFATE 10 MG: 100 SOLUTION ORAL at 12:20

## 2017-10-25 RX ADMIN — POTASSIUM CHLORIDE, DEXTROSE MONOHYDRATE AND SODIUM CHLORIDE: 150; 5; 900 INJECTION, SOLUTION INTRAVENOUS at 02:10

## 2017-10-25 RX ADMIN — FAMOTIDINE 20 MG: 10 INJECTION, SOLUTION INTRAVENOUS at 08:23

## 2017-10-25 RX ADMIN — MORPHINE SULFATE 15 MG: 15 TABLET, FILM COATED, EXTENDED RELEASE ORAL at 05:34

## 2017-10-25 RX ADMIN — MORPHINE SULFATE 4 MG: 4 INJECTION INTRAVENOUS at 04:16

## 2017-10-25 RX ADMIN — ONDANSETRON 4 MG: 4 TABLET, ORALLY DISINTEGRATING ORAL at 05:06

## 2017-10-25 RX ADMIN — MORPHINE SULFATE 15 MG: 15 TABLET, FILM COATED, EXTENDED RELEASE ORAL at 14:04

## 2017-10-25 RX ADMIN — ONDANSETRON 4 MG: 4 TABLET, ORALLY DISINTEGRATING ORAL at 17:45

## 2017-10-25 RX ADMIN — MORPHINE SULFATE 4 MG: 4 INJECTION INTRAVENOUS at 16:23

## 2017-10-25 ASSESSMENT — ENCOUNTER SYMPTOMS
NERVOUS/ANXIOUS: 0
HEMOPTYSIS: 0
FEVER: 0
SPUTUM PRODUCTION: 0
ABDOMINAL PAIN: 1
VOMITING: 1
COUGH: 0
NECK PAIN: 0
DIARRHEA: 0
PALPITATIONS: 0
ORTHOPNEA: 0
SEIZURES: 0
HEADACHES: 0
CONSTIPATION: 1
WEIGHT LOSS: 0
MYALGIAS: 0
BACK PAIN: 0
FALLS: 0
NAUSEA: 1
HEARTBURN: 0
BLURRED VISION: 0
CHILLS: 0
EYE DISCHARGE: 0
DIZZINESS: 0
EYE PAIN: 0
FOCAL WEAKNESS: 0
WEAKNESS: 1
DEPRESSION: 0

## 2017-10-25 ASSESSMENT — PAIN SCALES - GENERAL
PAINLEVEL_OUTOF10: 5
PAINLEVEL_OUTOF10: 4
PAINLEVEL_OUTOF10: 6
PAINLEVEL_OUTOF10: 6
PAINLEVEL_OUTOF10: 10
PAINLEVEL_OUTOF10: 8
PAINLEVEL_OUTOF10: 6
PAINLEVEL_OUTOF10: 10
PAINLEVEL_OUTOF10: 9
PAINLEVEL_OUTOF10: 9
PAINLEVEL_OUTOF10: 8
PAINLEVEL_OUTOF10: 4
PAINLEVEL_OUTOF10: 8

## 2017-10-25 NOTE — PROGRESS NOTES
I spoke to Dr. Galvan about the patient's pain management. After he has increased the patient's SL morphine to 10mg Q 3 hours with 4mg IV morphine Q 6 hours, he is reluctant to increase the pain medication due to the patient's code status.    This has been discussed with the patient. She is understanding at this time. Patient has been reposition and heat has been applied.

## 2017-10-25 NOTE — DISCHARGE PLANNING
Per Melissa with [facility] ([phone]), med equipment and hospital bed at pt's sister's home, BUT found out [facility] not contracted with pt's insurance and working on agreement with insurance to provide services. Melissa will keep CR updated and CR gave Melissa her number. [facility] and [facility] are the only agencies that serve [place].

## 2017-10-25 NOTE — CARE PLAN
Problem: Safety  Goal: Will remain free from injury  Outcome: PROGRESSING AS EXPECTED  Brittany Schultz Fall Risk Assessment:     Last Known Fall: Within the last six months  Mobility: Immobilized/requires assist of one person  Medications: Cardiovascular or central nervous system meds  Mental Status/LOC/Awareness: Awake, alert, and oriented to date, place, and person  Toileting Needs: Use of catheters or diversion devices, Incontinence  Volume/Electrolyte Status: Use of IV fluids/tube feeds  Communication/Sensory: Visual (Glasses)/hearing deficit  Behavior: Appropriate behavior  Brittany Schultz Fall Risk Total: 15  Fall Risk Level: HIGH RISK    Universal Fall Precautions:  call light/belongings in reach, bed in low position and locked, siderails up x 2, use non-slip footwear, adequate lighting, clutter free and spill free environment, educate on level of risk, educate to call for assistance    Fall Risk Level Interventions:    TRIAL ([place] 8, NEURO, MED JOHNATHON 5) Moderate Fall Risk Interventions  Place yellow fall risk ID band on patient: verified  Provide patient/family education based on risk assessment : completed  Educate patient/family to call staff for assistance when getting out of bed: completed  Place fall precaution signage outside patient door: verified  Utilize bed/chair fall alarm: verifiedTRIAL (TELE 8, NEURO, [employer] JOHNATHON 5) High Fall Risk Interventions  Place yellow fall risk ID band on patient: verified  Provide patient/family education based on risk assessment: completed  Educate patient/family to call staff for assistance when getting out of bed: completed  Place fall precaution signage outside patient door: verified  Place patient in room close to nursing station: verified  Utilize bed/chair fall alarm: completed  Notify charge of high risk for huddle: completed    Patient Specific Interventions:     Medication: review medications with patient and family and limit combination of prn medications  Mental  Status/LOC/Awareness: reinforce falls education, check on patient hourly, utilize bed/chair fall alarm and reinforce the use of call light  Toileting: provide frquent toileting, monitor intake and output/use of appropriate interventions, instruct patient/family on the need to call for assistance when toileting, do not leave patient unattended in bathroom/refer to toileting scripting and toilet prior to giving pain medications  Volume/Electrolyte Status: ensure patient remains hydrated, monitor blood sugars and maintain appropriate blood sugar levels if diabetic, advance diet as tolerated, administer medications as ordered for nausea and vomiting, monitor abnormal lab values and ensure IV fluids are appropriate  Communication/Sensory: update plan of care on whiteboard, ensure proper positioning when transferrng/ambulating, ensure patient has glasses/contacts and hearing aids/dentures and for visually impaired patients orient to their room surrounding and do not change their surroundings  Behavioral: encourage patient to voice feelings, engage patient in daily activities, administer medication as ordered and instruct/reinforce fall program rationale  Mobility: schedule physical activity throughout the day, provide comfort measures during transport, dangle prior to standing, utilize bed/chair fall alarm, ensure bed is locked and in lowest position and instruct patient to exit bed on their strongest side    Problem: Knowledge Deficit  Goal: Knowledge of disease process/condition, treatment plan, diagnostic tests, and medications will improve  Outcome: PROGRESSING AS EXPECTED  Plan of care reviewed with pt, all questions and concerns addressed, pt verbalized understanding. Pt looking forward to being discharged today.

## 2017-10-25 NOTE — DISCHARGE PLANNING
Medical Social Work    This SW received tc from Alec with [facility]. Alec informed this SW that they were trying to get approval through pt's insurance. Alec stated that she will keep this SW updated on any progress.

## 2017-10-25 NOTE — PROGRESS NOTES
Bedside report received. No overnight events. Patient A&O x 4. VS'S. Currently on 1 L via [place]. Complains of nausea and 9/10 abdominal pain will medicate per MAR. G-tube output overnight 1300 mls. Ivan in place. POC discussed with patient. Pt verbalizes understanding. Call light and belongings with in reach. Bed locked and in lowest position, alarm and fall precautions in place.

## 2017-10-25 NOTE — DISCHARGE PLANNING
Medical Social Work    This SW f/u with [facility] to find out if they had received agreement with pt's insurance. This SW spoke with Alec ([phone]) who stated that they were still working on the agreement. Alec stated she would try and have a answer by this afternoon. This SW left [phone] for Alec to call back on.    Plan: SW awaiting call from [facility] about accepting pt.

## 2017-10-25 NOTE — PROGRESS NOTES
[facility] Hospitalist Progress Note    Date of Service: 10/25/2017    Chief Complaint  62 y.o. female admitted 10/12/2017 with abdominal pain.    Interval Problem Update  No acute events overnight abdominal pain better controlled  pending hospice arrangements.     Consultants/Specialty  Surgery-Dr. Doll    Disposition  Patient requires additional treatment in the hospital        Review of Systems   Constitutional: Positive for malaise/fatigue. Negative for chills, fever and weight loss.   HENT: Negative for congestion, hearing loss and nosebleeds.    Eyes: Negative for blurred vision, pain and discharge.   Respiratory: Negative for cough, hemoptysis and sputum production.    Cardiovascular: Negative for chest pain, palpitations and orthopnea.   Gastrointestinal: Positive for abdominal pain, constipation, nausea and vomiting. Negative for diarrhea and heartburn.   Genitourinary: Negative for dysuria and urgency.   Musculoskeletal: Positive for joint pain. Negative for back pain, falls, myalgias and neck pain.   Skin: Negative for itching and rash.   Neurological: Positive for weakness. Negative for dizziness, focal weakness, seizures and headaches.   Psychiatric/Behavioral: Negative for depression and suicidal ideas. The patient is not nervous/anxious.       Physical Exam  Laboratory/Imaging   Hemodynamics  Temp (24hrs), Av.5 °C (97.7 °F), Min:36.2 °C (97.2 °F), Max:36.8 °C (98.2 °F)   Temperature: 36.4 °C (97.6 °F)  Pulse  Av.4  Min: 75  Max: 142    Blood Pressure: 116/67      Respiratory      Respiration: 18, Pulse Oximetry: 98 %        RUL Breath Sounds: Diminished, RML Breath Sounds: Diminished, RLL Breath Sounds: Diminished, MERE Breath Sounds: Diminished, LLL Breath Sounds: Diminished    Fluids    Intake/Output Summary (Last 24 hours) at 10/25/17 1236  Last data filed at 10/25/17 0730   Gross per 24 hour   Intake             2150 ml   Output             3135 ml   Net             -985 ml        Nutrition  Orders Placed This Encounter   Procedures   • DIET ORDER     Standing Status:   Standing     Number of Occurrences:   1     Order Specific Question:   Diet:     Answer:   Clear Liquids - No Red Foods [12]     Physical Exam   Constitutional: She is oriented to person, place, and time. No distress.   HENT:   Head: Normocephalic and atraumatic.   Left Ear: External ear normal.   Eyes: Conjunctivae and EOM are normal. Pupils are equal, round, and reactive to light. Right eye exhibits no discharge.   Neck: Normal range of motion. No thyromegaly present.   Cardiovascular: Normal rate and regular rhythm.    Murmur heard.  Pulmonary/Chest: Effort normal and breath sounds normal. No respiratory distress. She has no wheezes.   Abdominal: Soft. Bowel sounds are normal. She exhibits distension. There is tenderness. There is no rebound.   Musculoskeletal: She exhibits no edema.   Neurological: She is alert and oriented to person, place, and time. No cranial nerve deficit.   Skin: Skin is warm and dry. She is not diaphoretic.   Nursing note and vitals reviewed.                               Assessment/Plan     SBO (small bowel obstruction)- (present on admission)   Assessment & Plan    - history of multiple surgeries in the past and radiation for her rectal cancer  - post exploratory lap: 10/20: carcinomatosis with extensive adhesions  - oral diet as for comfort  - poor prognosis  - seems to be worsening, no much drainage from the gastrostomy tube. After iv flush, some yellowish output.  Discussed with general surgeon.  Nothing more can be done.  - monitor closely for acute abdomen          Anal carcinoma (CMS-HCC)- (present on admission)   Assessment & Plan    - Chronic, palliative care following.        Chronic pain   Assessment & Plan    Cont  oral morphine in anticipation for hospice.  Continue ms contin for sustained pain control.        Normocytic anemia- (present on admission)   Assessment & Plan    -  Chronic, no sign of gross bleeding        Rectal cancer (CMS-HCC)- (present on admission)   Assessment & Plan    - Patient is on experimental drug as an outpatient, continue outpatient follow-up  - Dr. Sue states that the patient is a candidate for hospice  -- thus hospice referral made.             Reviewed items::  Labs reviewed, Medications reviewed and Radiology images reviewed  DVT prophylaxis pharmacological::  Enoxaparin (Lovenox)  Antibiotics:  Treating active infection/contamination beyond 24 hours perioperative coverage

## 2017-10-25 NOTE — PROGRESS NOTES
Brittany Schultz Fall Risk Assessment:     Last Known Fall: Within the last six months  Mobility: Immobilized/requires assist of one person  Medications: Cardiovascular or central nervous system meds  Mental Status/LOC/Awareness: Awake, alert, and oriented to date, place, and person  Toileting Needs: Use of catheters or diversion devices, Incontinence  Volume/Electrolyte Status: Use of IV fluids/tube feeds  Communication/Sensory: Visual (Glasses)/hearing deficit  Behavior: Appropriate behavior  Brittany Schultz Fall Risk Total: 15  Fall Risk Level: HIGH RISK    Universal Fall Precautions:  call light/belongings in reach, bed in low position and locked, wheelchairs and assistive devices out of sight, siderails up x 2, use non-slip footwear, adequate lighting, clutter free and spill free environment, educate on level of risk, educate to call for assistance    Fall Risk Level Interventions:    TRIAL (TELE 8, NEURO, MED JOHNATHON 5) Moderate Fall Risk Interventions  Place yellow fall risk ID band on patient: verified  Provide patient/family education based on risk assessment : verified  Educate patient/family to call staff for assistance when getting out of bed: verified  Place fall precaution signage outside patient door: verified  Utilize bed/chair fall alarm: verifiedTRIAL (TELE 8, NEURO, [employer] JOHNATHON 5) High Fall Risk Interventions  Place yellow fall risk ID band on patient: verified  Provide patient/family education based on risk assessment: verified  Educate patient/family to call staff for assistance when getting out of bed: verified  Place fall precaution signage outside patient door: verified  Place patient in room close to nursing station: verified  Utilize bed/chair fall alarm: verified  Notify charge of high risk for huddle: verified    Patient Specific Interventions:     Medication: review medications with patient and family, assess for medications that can be discontinued or dosage decreased and limit combination of  prn medications  Mental Status/LOC/Awareness: reinforce falls education, encourage family to stay with patient, check on patient hourly, utilize bed/chair fall alarm, reinforce the use of call light and provide activity  Toileting: provide frquent toileting  Volume/Electrolyte Status: ensure patient remains hydrated, advance diet as tolerated, administer medications as ordered for nausea and vomiting, monitor abnormal lab values and ensure IV fluids are appropriate  Communication/Sensory: update plan of care on whiteboard and ensure proper positioning when transferrng/ambulating  Behavioral: encourage patient to voice feelings, engage patient in daily activities, administer medication as ordered, instruct/reinforce fall program rationale and encourage family to stay with impulsive patients  Mobility: schedule physical activity throughout the day, provide comfort measures during transport, dangle prior to standing, utilize bed/chair fall alarm, ensure bed is locked and in lowest position, provide appropriate assistive device, instruct patient to exit bed on their strongest side and collaborate with doctor for possible PT/OT consult

## 2017-10-25 NOTE — PALLIATIVE CARE
"Palliative Care follow-up  POLST received and signed by patient and MD; Allow Natural Death\" chosen .  Scanned into Epic.  Advance Directive in place noting #2,3,and 5 regarding \"Satement of Desires\"..  Updated: Medical staff and Palliative Care team.    Plan: Possible discharge with Hospice.     Thank you for allowing Palliative Care to participate in this patient's care. Please feel free to call [phone] with any questions or concerns.  "

## 2017-10-26 PROCEDURE — 700101 HCHG RX REV CODE 250: Performed by: INTERNAL MEDICINE

## 2017-10-26 PROCEDURE — 700111 HCHG RX REV CODE 636 W/ 250 OVERRIDE (IP): Performed by: INTERNAL MEDICINE

## 2017-10-26 PROCEDURE — 700111 HCHG RX REV CODE 636 W/ 250 OVERRIDE (IP): Performed by: SURGERY

## 2017-10-26 PROCEDURE — 700102 HCHG RX REV CODE 250 W/ 637 OVERRIDE(OP): Performed by: INTERNAL MEDICINE

## 2017-10-26 PROCEDURE — A9270 NON-COVERED ITEM OR SERVICE: HCPCS | Performed by: INTERNAL MEDICINE

## 2017-10-26 PROCEDURE — 770020 HCHG ROOM/CARE - TELE (206)

## 2017-10-26 PROCEDURE — 99232 SBSQ HOSP IP/OBS MODERATE 35: CPT | Performed by: INTERNAL MEDICINE

## 2017-10-26 RX ADMIN — MORPHINE SULFATE 10 MG: 100 SOLUTION ORAL at 19:52

## 2017-10-26 RX ADMIN — MORPHINE SULFATE 10 MG: 100 SOLUTION ORAL at 05:04

## 2017-10-26 RX ADMIN — POTASSIUM CHLORIDE, DEXTROSE MONOHYDRATE AND SODIUM CHLORIDE: 150; 5; 900 INJECTION, SOLUTION INTRAVENOUS at 18:06

## 2017-10-26 RX ADMIN — MORPHINE SULFATE 4 MG: 4 INJECTION INTRAVENOUS at 06:10

## 2017-10-26 RX ADMIN — ONDANSETRON 4 MG: 4 TABLET, ORALLY DISINTEGRATING ORAL at 17:50

## 2017-10-26 RX ADMIN — FAMOTIDINE 20 MG: 10 INJECTION, SOLUTION INTRAVENOUS at 08:58

## 2017-10-26 RX ADMIN — MORPHINE SULFATE 4 MG: 4 INJECTION INTRAVENOUS at 17:50

## 2017-10-26 RX ADMIN — MAGNESIUM HYDROXIDE 30 ML: 400 SUSPENSION ORAL at 12:12

## 2017-10-26 RX ADMIN — ONDANSETRON 4 MG: 4 TABLET, ORALLY DISINTEGRATING ORAL at 12:09

## 2017-10-26 RX ADMIN — MORPHINE SULFATE 10 MG: 100 SOLUTION ORAL at 12:08

## 2017-10-26 RX ADMIN — ONDANSETRON 4 MG: 4 TABLET, ORALLY DISINTEGRATING ORAL at 05:04

## 2017-10-26 RX ADMIN — ENOXAPARIN SODIUM 40 MG: 100 INJECTION SUBCUTANEOUS at 08:58

## 2017-10-26 RX ADMIN — MORPHINE SULFATE 10 MG: 100 SOLUTION ORAL at 16:44

## 2017-10-26 RX ADMIN — MORPHINE SULFATE 10 MG: 100 SOLUTION ORAL at 08:57

## 2017-10-26 RX ADMIN — FAMOTIDINE 20 MG: 10 INJECTION, SOLUTION INTRAVENOUS at 20:00

## 2017-10-26 RX ADMIN — MORPHINE SULFATE 10 MG: 100 SOLUTION ORAL at 00:38

## 2017-10-26 RX ADMIN — MORPHINE SULFATE 10 MG: 100 SOLUTION ORAL at 23:08

## 2017-10-26 ASSESSMENT — ENCOUNTER SYMPTOMS
SEIZURES: 0
DIZZINESS: 0
PALPITATIONS: 0
SPUTUM PRODUCTION: 0
EYE PAIN: 0
NECK PAIN: 0
COUGH: 0
HEMOPTYSIS: 0
HEADACHES: 0
BACK PAIN: 0
ORTHOPNEA: 0
HEARTBURN: 0
NERVOUS/ANXIOUS: 0
WEAKNESS: 1
WEIGHT LOSS: 0
EYE DISCHARGE: 0
VOMITING: 1
FALLS: 0
FEVER: 0
DEPRESSION: 0
DIARRHEA: 0
ABDOMINAL PAIN: 1
MYALGIAS: 0
BLURRED VISION: 0
NAUSEA: 1
CONSTIPATION: 1
FOCAL WEAKNESS: 0
CHILLS: 0

## 2017-10-26 ASSESSMENT — PAIN SCALES - GENERAL
PAINLEVEL_OUTOF10: 10
PAINLEVEL_OUTOF10: 9
PAINLEVEL_OUTOF10: 8
PAINLEVEL_OUTOF10: 7
PAINLEVEL_OUTOF10: 7
PAINLEVEL_OUTOF10: 8
PAINLEVEL_OUTOF10: 6
PAINLEVEL_OUTOF10: 8
PAINLEVEL_OUTOF10: 6
PAINLEVEL_OUTOF10: 8

## 2017-10-26 NOTE — PROGRESS NOTES
[facility] Hospitalist Progress Note    Date of Service: 10/26/2017    Chief Complaint  62 y.o. female admitted 10/12/2017 with abdominal pain.    Interval Problem Update  Pt seen and examined no acute events overnight abdominal pain controlled  pending hospice arrangements.     Consultants/Specialty  Surgery-Dr. Doll    Disposition  Patient requires additional treatment in the hospital        Review of Systems   Constitutional: Positive for malaise/fatigue. Negative for chills, fever and weight loss.   HENT: Negative for congestion, hearing loss and nosebleeds.    Eyes: Negative for blurred vision, pain and discharge.   Respiratory: Negative for cough, hemoptysis and sputum production.    Cardiovascular: Negative for chest pain, palpitations and orthopnea.   Gastrointestinal: Positive for abdominal pain, constipation, nausea and vomiting. Negative for diarrhea and heartburn.   Genitourinary: Negative for dysuria and urgency.   Musculoskeletal: Positive for joint pain. Negative for back pain, falls, myalgias and neck pain.   Skin: Negative for itching and rash.   Neurological: Positive for weakness. Negative for dizziness, focal weakness, seizures and headaches.   Psychiatric/Behavioral: Negative for depression and suicidal ideas. The patient is not nervous/anxious.       Physical Exam  Laboratory/Imaging   Hemodynamics  Temp (24hrs), Av °C (98.6 °F), Min:36.7 °C (98 °F), Max:37.3 °C (99.2 °F)   Temperature: 37.2 °C (98.9 °F)  Pulse  Av.9  Min: 75  Max: 142    Blood Pressure: 114/51      Respiratory      Respiration: 12, Pulse Oximetry: 99 %        RUL Breath Sounds: Diminished, RML Breath Sounds: Diminished, RLL Breath Sounds: Diminished, MERE Breath Sounds: Diminished, LLL Breath Sounds: Diminished    Fluids    Intake/Output Summary (Last 24 hours) at 10/26/17 1355  Last data filed at 10/26/17 0600   Gross per 24 hour   Intake              360 ml   Output             3750 ml   Net            -3390 ml        Nutrition  Orders Placed This Encounter   Procedures   • DIET ORDER     Standing Status:   Standing     Number of Occurrences:   1     Order Specific Question:   Diet:     Answer:   Clear Liquids - No Red Foods [12]     Physical Exam   Constitutional: She is oriented to person, place, and time. No distress.   HENT:   Head: Normocephalic and atraumatic.   Left Ear: External ear normal.   Eyes: Conjunctivae and EOM are normal. Pupils are equal, round, and reactive to light. Right eye exhibits no discharge.   Neck: Normal range of motion. No thyromegaly present.   Cardiovascular: Normal rate and regular rhythm.    Murmur heard.  Pulmonary/Chest: Effort normal and breath sounds normal. No respiratory distress. She has no wheezes.   Abdominal: Soft. Bowel sounds are normal. She exhibits distension. There is tenderness. There is no rebound.   Musculoskeletal: She exhibits no edema.   Neurological: She is alert and oriented to person, place, and time. No cranial nerve deficit.   Skin: Skin is warm and dry. She is not diaphoretic.   Nursing note and vitals reviewed.                               Assessment/Plan     SBO (small bowel obstruction)- (present on admission)   Assessment & Plan    - history of multiple surgeries in the past and radiation for her rectal cancer  - post exploratory lap: 10/20: carcinomatosis with extensive adhesions  - oral diet as for comfort  - poor prognosis  - seems to be worsening, no much drainage from the gastrostomy tube. After iv flush, some yellowish output.  Discussed with general surgeon.  Nothing more can be done.  - monitor closely for acute abdomen          Anal carcinoma (CMS-HCC)- (present on admission)   Assessment & Plan    - Chronic, palliative care following.        Chronic pain   Assessment & Plan    Cont  oral morphine in anticipation for hospice.  Continue ms contin for sustained pain control.        Normocytic anemia- (present on admission)   Assessment & Plan    -  Chronic, no sign of gross bleeding        Rectal cancer (CMS-HCC)- (present on admission)   Assessment & Plan    - Patient is on experimental drug as an outpatient, continue outpatient follow-up  - Dr. Sue states that the patient is a candidate for hospice  -- thus hospice referral made.             Reviewed items::  Labs reviewed, Medications reviewed and Radiology images reviewed  DVT prophylaxis pharmacological::  Enoxaparin (Lovenox)  Antibiotics:  Treating active infection/contamination beyond 24 hours perioperative coverage

## 2017-10-26 NOTE — CARE PLAN
Problem: Pain Management  Goal: Pain level will decrease to patient's comfort goal  Outcome: PROGRESSING AS EXPECTED  Pt c/o pain of 10 on 0-10 pain scale. Pt medicated appropriately for pain.

## 2017-10-26 NOTE — CARE PLAN
Problem: Safety  Goal: Will remain free from falls  Outcome: PROGRESSING AS EXPECTED  Brittany Schultz Fall Risk Assessment:     Last Known Fall: Within the last six months  Mobility: Immobilized/requires assist of one person  Medications: Cardiovascular or central nervous system meds  Mental Status/LOC/Awareness: Awake, alert, and oriented to date, place, and person  Toileting Needs: Use of catheters or diversion devices  Volume/Electrolyte Status: Use of IV fluids/tube feeds  Communication/Sensory: Visual (Glasses)/hearing deficit  Behavior: Appropriate behavior  Brittany Schultz Fall Risk Total: 12  Fall Risk Level: MODERATE RISK    Universal Fall Precautions:  call light/belongings in reach, bed in low position and locked, siderails up x 2, use non-slip footwear    Fall Risk Level Interventions:    TRIAL ([place], NEURO, MED JOHNATHON 5) Moderate Fall Risk Interventions  Place yellow fall risk ID band on patient: verified  Provide patient/family education based on risk assessment : completed  Educate patient/family to call staff for assistance when getting out of bed: completed  Place fall precaution signage outside patient door: verified  Utilize bed/chair fall alarm: verifiedTRIAL ([employer] 8, NEURO, MED JOHNATHON 5) High Fall Risk Interventions  Place yellow fall risk ID band on patient: verified  Provide patient/family education based on risk assessment: verified  Educate patient/family to call staff for assistance when getting out of bed: verified  Place fall precaution signage outside patient door: verified  Place patient in room close to nursing station: verified  Utilize bed/chair fall alarm: verified  Notify charge of high risk for huddle: verified    Patient Specific Interventions:     Medication: limit combination of prn medications  Mental Status/LOC/Awareness: reinforce falls education, encourage family to stay with patient, check on patient hourly and utilize bed/chair fall alarm  Toileting: monitor intake and  output/use of appropriate interventions  Volume/Electrolyte Status: ensure patient remains hydrated and advance diet as tolerated  Communication/Sensory: update plan of care on whiteboard  Behavioral: encourage patient to voice feelings and engage patient in daily activities  Mobility: dangle prior to standing and utilize bed/chair fall alarm    Problem: Discharge Barriers/Planning  Goal: Patient's continuum of care needs will be met  Outcome: PROGRESSING AS EXPECTED

## 2017-10-26 NOTE — DISCHARGE PLANNING
"Medical Social Work     This SW spoke with Natan HAND ([phone]) and she explained to this SW that Natan does not contract with any Hospice agencies because that means the patient is past \"managed care.\"     She explained to this SW that Hospice agencies will take pt and then apply to roll them to Medicaid FFS to be reimbursed.    This SW called [facility] ([phone]) and they stated they will get to working on this and call SW back with updates.   "

## 2017-10-26 NOTE — DISCHARGE PLANNING
Medical Social Work    This SW received tc from [facility] ([phone]) with [facility] who informed this SW that they spoke with their Medicaid representative and she informed them that a FA-24-A form needs to be filled out and faxed to Medicaid. This form will allow the pt to receive hospice in home and be switched from [facility] to Medicaid FFS.    This SW obtained FA-24-A form from [place] Medicaid web site and filled out upper portion and faxed to [facility] along with instructions on how to submit form to medicaid and pt's latest progress notes that describe pt's diagnosis.    Plan: SW to f/u with [facility] on application to [place] Medicaid.

## 2017-10-26 NOTE — CARE PLAN
Brittany Schultz Fall Risk Assessment:     Last Known Fall: Within the last six months  Mobility: Immobilized/requires assist of one person  Medications: Cardiovascular or central nervous system meds  Mental Status/LOC/Awareness: Awake, alert, and oriented to date, place, and person  Toileting Needs: Use of catheters or diversion devices  Volume/Electrolyte Status: Use of IV fluids/tube feeds  Communication/Sensory: Visual (Glasses)/hearing deficit  Behavior: Depression/anxiety  Brittany Schultz Fall Risk Total: 13  Fall Risk Level: MODERATE RISK    Universal Fall Precautions:  call light/belongings in reach, bed in low position and locked, wheelchairs and assistive devices out of sight, siderails up x 2, use non-slip footwear, adequate lighting, clutter free and spill free environment, educate on level of risk, educate to call for assistance    Fall Risk Level Interventions:    TRIAL ([employer] 8, NEURO, MED JOHNATHON 5) Moderate Fall Risk Interventions  Place yellow fall risk ID band on patient: verified  Provide patient/family education based on risk assessment : verified  Educate patient/family to call staff for assistance when getting out of bed: verified  Place fall precaution signage outside patient door: verified  Utilize bed/chair fall alarm: verifiedTRIAL ([employer] 8, NEURO, MED JOHNATHON 5) High Fall Risk Interventions  Place yellow fall risk ID band on patient: verified  Provide patient/family education based on risk assessment: verified  Educate patient/family to call staff for assistance when getting out of bed: verified  Place fall precaution signage outside patient door: verified  Place patient in room close to nursing station: verified  Utilize bed/chair fall alarm: verified  Notify charge of high risk for huddle: verified    Patient Specific Interventions:     Medication: limit combination of prn medications  Mental Status/LOC/Awareness: not applicable  Toileting: provide frquent toileting  Volume/Electrolyte Status:  ensure IV fluids are appropriate  Communication/Sensory: ensure patient has glasses/contacts and hearing aids/dentures  Behavioral: not applicable  Mobility: utilize bed/chair fall alarm

## 2017-10-26 NOTE — DISCHARGE PLANNING
Medical Social Work    Received voicemail from Meche with [facility] stating that pt's insurance will not contract with them.     Plan: CR is researching other Hospice agencies that service [facility] and are contracted with [facility].

## 2017-10-26 NOTE — PROGRESS NOTES
Assumed pt care at 1900. Received bedside report from RN. PM assessment completed. AAOx4. Pt denies SOB; c/o pain of 10 on 0 to 10 pain scale (Will administer medication PRN - see MAR). Provided pt with RN and CNA extension numbers on white board and encouraged pt to call when needed. Discussed plan of care for the night, pt verbalizes understanding. VSS. Denies any additional needs at this time. Call light, belongings, and phone within reach. Hourly rounding in effect.

## 2017-10-27 PROCEDURE — 700101 HCHG RX REV CODE 250: Performed by: INTERNAL MEDICINE

## 2017-10-27 PROCEDURE — 302112 WASHCLOTH,PERINEAL CARE: Performed by: INTERNAL MEDICINE

## 2017-10-27 PROCEDURE — 700102 HCHG RX REV CODE 250 W/ 637 OVERRIDE(OP): Performed by: INTERNAL MEDICINE

## 2017-10-27 PROCEDURE — 700111 HCHG RX REV CODE 636 W/ 250 OVERRIDE (IP): Performed by: INTERNAL MEDICINE

## 2017-10-27 PROCEDURE — 99232 SBSQ HOSP IP/OBS MODERATE 35: CPT | Performed by: INTERNAL MEDICINE

## 2017-10-27 PROCEDURE — A9270 NON-COVERED ITEM OR SERVICE: HCPCS | Performed by: INTERNAL MEDICINE

## 2017-10-27 PROCEDURE — 700111 HCHG RX REV CODE 636 W/ 250 OVERRIDE (IP): Performed by: SURGERY

## 2017-10-27 PROCEDURE — 770020 HCHG ROOM/CARE - TELE (206)

## 2017-10-27 RX ADMIN — MORPHINE SULFATE 4 MG: 4 INJECTION INTRAVENOUS at 06:23

## 2017-10-27 RX ADMIN — POTASSIUM CHLORIDE, DEXTROSE MONOHYDRATE AND SODIUM CHLORIDE: 150; 5; 900 INJECTION, SOLUTION INTRAVENOUS at 14:26

## 2017-10-27 RX ADMIN — MORPHINE SULFATE 4 MG: 4 INJECTION INTRAVENOUS at 12:55

## 2017-10-27 RX ADMIN — MORPHINE SULFATE 10 MG: 100 SOLUTION ORAL at 03:55

## 2017-10-27 RX ADMIN — ONDANSETRON 4 MG: 4 TABLET, ORALLY DISINTEGRATING ORAL at 15:13

## 2017-10-27 RX ADMIN — MORPHINE SULFATE 4 MG: 4 INJECTION INTRAVENOUS at 00:22

## 2017-10-27 RX ADMIN — MORPHINE SULFATE 10 MG: 100 SOLUTION ORAL at 18:04

## 2017-10-27 RX ADMIN — FAMOTIDINE 20 MG: 10 INJECTION, SOLUTION INTRAVENOUS at 20:44

## 2017-10-27 RX ADMIN — FAMOTIDINE 20 MG: 10 INJECTION, SOLUTION INTRAVENOUS at 08:51

## 2017-10-27 RX ADMIN — MORPHINE SULFATE 4 MG: 4 INJECTION INTRAVENOUS at 18:48

## 2017-10-27 RX ADMIN — ENOXAPARIN SODIUM 40 MG: 100 INJECTION SUBCUTANEOUS at 08:51

## 2017-10-27 RX ADMIN — ONDANSETRON 4 MG: 2 INJECTION INTRAMUSCULAR; INTRAVENOUS at 08:51

## 2017-10-27 ASSESSMENT — PAIN SCALES - GENERAL
PAINLEVEL_OUTOF10: 9
PAINLEVEL_OUTOF10: 7
PAINLEVEL_OUTOF10: 10
PAINLEVEL_OUTOF10: 10
PAINLEVEL_OUTOF10: 7
PAINLEVEL_OUTOF10: 7
PAINLEVEL_OUTOF10: 9

## 2017-10-27 NOTE — DISCHARGE PLANNING
Received transport request from [facility]. Per [facility], family is going to transport patient home.

## 2017-10-27 NOTE — CARE PLAN
Problem: Nutritional:  Goal: Achieve adequate nutritional intake  Patient will start diet and consume >50% of meals   Outcome: PROGRESSING SLOWER THAN EXPECTED

## 2017-10-27 NOTE — DISCHARGE SUMMARY
HOSPITAL MEDICINE DISCHARGE SUMMARY     Name: Marilyn Strange  MRN: [id number]  : 1955  Admit Date: 10/12/2017  Discharge Date: 10/27/2017  Attending Provider: Jim Amezquita M.D.  Admitting Provider: Mone Waterman M.D.  Primary Care Physician: REMY Del Rio     DISCHARGE DIAGNOSES:   Active Problems:    SBO (small bowel obstruction) due to malignant obstruction/carcinomatosis POA: Yes    Anal carcinoma (CMS-HCC) POA: Yes    Rectal cancer (CMS-HCC) POA: Yes    Normocytic anemia POA: Yes    Chronic pain POA: Yes  Resolved Problems:    UTI (urinary tract infection) POA: Yes    Hypokalemia POA: Yes    Hyponatremia POA: Yes    Sepsis (CMS-HCC) POA: Yes    Chest pain POA: Yes        SUMMARY OF EVENTS LEADING TO ADMISSION:   This is a 62-year-old female with   multiple cancers including breast and rectal with liver mets and cervical, who   was admitted on 10/12/2017 with abdominal pain.    For further details of history of present illness, past medical, social,   family histories, allergies, and medications, please refer to admission H and   P by Dr. Mone Waterman on 10/12/2017.    HOSPITAL COURSE:  The patient was admitted to the hospitalist service after   being initially evaluated in the emergency department.  She was found to have   small bowel obstruction, and was also felt to have had sepsis, presumably from   SBO and urinary tract infection.  She was started on antibiotics and IV   fluids.  Initial conservative management failed, and thus surgery was   consulted, and patient underwent laparoscopy which showed peritoneal   carcinomatosis, and malignant obstruction on the right lower quadrant.  Due to   these findings, no further surgical intervention was done intraoperatively,   and a gastrostomy tube was placed for palliation.  Palliative care service was   consulted, and patient subsequently opted for comfort care, and wanted to go   home on hospice.  She was started on comfort  measures.    She completed a course of antibiotics, and her pain was controlled with oral   Roxanol, along with symptomatic therapy with antiemetics and PPI.  Home   hospice services were arranged with the help of the  and case   managers.    With home comfort care/hospice arranged, she was deemed ready to be discharged   from the hospital as she did not have any further inpatient needs.  Patient   felt comfortable going home with hospice.    The discharge plan was discussed with the patient, with which she was   agreeable to.    The patient was subsequently sent home in improved and stable condition.    DISCHARGE DISPOSITION: comfort care on hospice.      FOLLOW-UP ISSUES:   - she will be continued on comfort care measures with hospice at home. Continue PRN roxanol for pain, and symptomatic therapy with PRN antiemetics and protonix. Home hospice services will follow her.      CHANGES IN MANAGEMENT OF CHRONIC CONDITION: As above.      PENDING TESTS: none     FOLLOW-UP TESTS ORDERED POST DISCHARGE: none     DISCHARGE MEDICATIONS:   Medication Sig Start Date End Date   morphine ER (MS CONTIN) 15 MG Tab CR tablet Take 1 Tab by mouth 3 times a day. 10/23/17     morphine (ROXANOL) 20 MG/ML Solution Take 0.25 mL by mouth every 3 hours as needed. 10/23/17     ondansetron (ZOFRAN ODT) 4 MG TABLET DISPERSIBLE Take 1 Tab by mouth every four hours as needed for Nausea/Vomiting (give PO if no IV route available). 6/19/17     Fluticasone Furoate-Vilanterol (BREO ELLIPTA) 100-25 MCG/INH AEROSOL POWDER, BREATH ACTIVATED Inhale 1 Puff by mouth every day.       enalapril (VASOTEC) 20 MG tablet Take 20 mg by mouth 2 times a day.       carvedilol (COREG) 25 MG Tab Take 25 mg by mouth 2 times a day.       pantoprazole (PROTONIX) 40 MG Tablet Delayed Response Take 40 mg by mouth every day.       atorvastatin (LIPITOR) 20 MG Tab Take 20 mg by mouth every evening.       aspirin (ASA) 81 MG Chew Tab chewable tablet Take 81 mg  by mouth every day.                FOLLOW-UP APPOINTMENTS:   REMY Del Rio  [address] [address]  [phone]     Schedule an appointment as soon as possible for a visit in 2 weeks  Follow-up appointment           For further details on discharge medications, patient education, diet, and activity, please refer to electronic copy of discharge instructions.         TIME SPENT: 40 minutes, with greater than 50% of the time spent on face-to-face encounter, addressing medical issues, coordination of care, counseling, discharge planning, medication reconciliation, and documentation.          ____________________________________     KIEL Bobby / KAILASH    DD:  10/27/2017 10:44:58  DT:  10/27/2017 10:59:39    D#:  [id number]  Job#:  [id number]

## 2017-10-27 NOTE — CARE PLAN
Problem: Safety  Goal: Will remain free from falls  Outcome: PROGRESSING AS EXPECTED  Brittany Schultz Fall Risk Assessment:     Last Known Fall: Within the last six months  Mobility: Immobilized/requires assist of one person  Medications: No meds  Mental Status/LOC/Awareness: Awake, alert, and oriented to date, place, and person  Toileting Needs: Use of catheters or diversion devices  Volume/Electrolyte Status: Use of IV fluids/tube feeds  Communication/Sensory: Visual (Glasses)/hearing deficit  Behavior: Appropriate behavior  Brittany Schultz Fall Risk Total: 11  Fall Risk Level: MODERATE RISK    Universal Fall Precautions:  call light/belongings in reach, bed in low position and locked, use non-slip footwear, adequate lighting, clutter free and spill free environment, educate to call for assistance, educate on level of risk    Fall Risk Level Interventions:    TRIAL ([place] 8, NEURO, MED JOHNATHON 5) Moderate Fall Risk Interventions  Place yellow fall risk ID band on patient: verified  Provide patient/family education based on risk assessment : completed  Educate patient/family to call staff for assistance when getting out of bed: completed  Place fall precaution signage outside patient door: verified  Utilize bed/chair fall alarm: refusedTRIAL ([place] 8, NEURO, MED JOHNATHON 5) High Fall Risk Interventions  Place yellow fall risk ID band on patient: verified  Provide patient/family education based on risk assessment: verified  Educate patient/family to call staff for assistance when getting out of bed: verified  Place fall precaution signage outside patient door: verified  Place patient in room close to nursing station: verified  Utilize bed/chair fall alarm: verified  Notify charge of high risk for huddle: verified    Patient Specific Interventions:     Medication: review medications with patient and family  Mental Status/LOC/Awareness: check on patient hourly, utilize bed/chair fall alarm and reinforce the use of call  light  Toileting: monitor intake and output/use of appropriate interventions  Volume/Electrolyte Status: ensure patient remains hydrated and administer medications as ordered for nausea and vomiting  Communication/Sensory: update plan of care on whiteboard, ensure proper positioning when transferrng/ambulating and ensure patient has glasses/contacts and hearing aids/dentures  Behavioral: engage patient in daily activities  Mobility: schedule physical activity throughout the day, provide comfort measures during transport, dangle prior to standing, utilize bed/chair fall alarm and ensure bed is locked and in lowest position

## 2017-10-27 NOTE — CARE PLAN
Problem: Knowledge Deficit  Goal: Knowledge of disease process/condition, treatment plan, diagnostic tests, and medications will improve  Outcome: PROGRESSING AS EXPECTED  Plan of care reviewed and discussed with pt at beginning of shift, all questions and concerns addressed, pt verbalized understanding.

## 2017-10-27 NOTE — DISCHARGE PLANNING
Medical Social Work    Received tc from Meche ([phone]) with [facility] who explained to this SW that there was some confusion and Medicaid has not approved for pt's Hospice services. Meche stated that they attempted to make contact with Medicaid but they are closed due to the Holiday. Pt was updated that she would not be going home today and hopefully earlier next week. Pt was very tearful and SW provided emotional support.    Meche stated that they are hoping to have acceptance on Monday and the pt can go home. This SW provided Meche with [phone] and [phone] to call on Monday with update.    Plan: SW to assist with getting pt home on hospice.

## 2017-10-27 NOTE — DISCHARGE PLANNING
Medical Social Work    This SW spoke with Meche at [facility] about pt's Medicaid status. She informed this SW that she was unsure if approval was needed prior to the accepting pt. This SW asked that Meche speak with her clinical director about taking pt prior to acceptance since it backdates to the day the Hospice agency applied.    Plan: SW waiting call back from Meche.

## 2017-10-27 NOTE — DIETARY
"Nutrition: Day 15 of admit.  63 yo female admitted with small bowel obstruction.  She has a history of multiple surgeries secondary to rectal cancer.  Poor nutritional intake PTA noted on admitting screen.  Pt was on a regular diet taking 25-75% of meals prior to having laparoscopic g-tube placement on 10/20.  She has now been on a clear liquid no red foods diet since 10/21 (6 days). Pt is followed by Dr. Sue who stated pt is a candidate for hospice. Pt to transfer to hospice however has had difficulty being accepted. SW working on plan for hospice. Concern for diet advancement although pt to transition to hospice may not be comfort care.     Recommendations/Plan:  1) advance diet per pt tolerance and preferences - pt has been on a clear liquid diet for 6 days.   2) consider use of g-tube for supplemental nutrition in accordance with pt/family wishes for plan of care  3) discussed with Dr. Amezquita who agrees \"food for comfort\" per pt wishes.  Diet advancement to be left up to hospice care if pt discharges today and if not Dr. Pratt will advance diet here.       "

## 2017-10-27 NOTE — CARE PLAN
Problem: Safety  Goal: Will remain free from injury  Outcome: PROGRESSING AS EXPECTED  A/Ox4, ambulates x1 assist, uses call light appropriately. Personal belongings and call light within reach, bed locked & in lowest position, side rails up x3, safety maintained.

## 2017-10-27 NOTE — DISCHARGE PLANNING
Medical Social Work    This SW spoke with Veronica ([phone]) with [facility] who informed this SW that they have accepted pt. [facility] will be sending a nurse to see pt at bedside. SW sent transport form to [facility] requesting a 1400 transfer.     Plan: Awaiting confirmation of transport time.

## 2017-10-27 NOTE — PROGRESS NOTES
[facility] Hospitalist Progress Note    Date of Service: 10/27/2017    Chief Complaint  62 y.o. Female with multiple cancers including breast, rectal with liver mets, and cervical,  admitted 10/12/2017 with abdominal pain. Found to have SBO. Initial conservative management failed, and thus surgery was consulted and had laparoscopy which showed carcinomatosis and malignant obstruction on the RLQ. No further surgical intervention done intra op, and gastrostomy tube was placed. Palliative care was consulted, and patient subsequently opted for hospice. Started on comfort measures. SW working on arranging home hospice.     Interval Problem Update  10/27/2017 - uneventful night. VSS. Afebrile. Saturating well on 1L O2 NC. CM/SW continuing to work with insurance/hospice agencies to get patient home with hospice.     > Seen and examined. Appears comfortable. (+) abd pain. No nausea, vomiting. (+) slight leak around gastrostomy site. No CP, SOB.       Consultants/Specialty  Surgery  Oncology    Disposition  Home hospice being arranged.         ROS     Pertinent positives/negatives as mentioned above.     A complete review of systems was done. All other systems were negative.      Physical Exam  Laboratory/Imaging   Hemodynamics  Temp (24hrs), Av.8 °C (98.2 °F), Min:36.3 °C (97.3 °F), Max:37.2 °C (98.9 °F)   Temperature: 36.9 °C (98.4 °F)  Pulse  Av.6  Min: 75  Max: 142    Blood Pressure: 111/63      Respiratory      Respiration: 14, Pulse Oximetry: 100 %        RUL Breath Sounds: Diminished, RML Breath Sounds: Diminished, RLL Breath Sounds: Diminished, MERE Breath Sounds: Diminished, LLL Breath Sounds: Diminished    Fluids    Intake/Output Summary (Last 24 hours) at 10/27/17 0815  Last data filed at 10/27/17 0400   Gross per 24 hour   Intake              780 ml   Output             2750 ml   Net            -1970 ml       Nutrition  Orders Placed This Encounter   Procedures   • DIET ORDER     Standing Status:   Standing      Number of Occurrences:   1     Order Specific Question:   Diet:     Answer:   Clear Liquids - No Red Foods [12]     Physical Exam   Constitutional: She appears well-developed and well-nourished. No distress.   HENT:   Head: Normocephalic and atraumatic.   Mouth/Throat: No oropharyngeal exudate.   Eyes: Conjunctivae are normal. Pupils are equal, round, and reactive to light. No scleral icterus.   Neck: Normal range of motion. Neck supple.   Cardiovascular: Normal rate and regular rhythm.  Exam reveals no gallop and no friction rub.    No murmur heard.  Pulmonary/Chest: Effort normal and breath sounds normal. No respiratory distress. She has no wheezes. She has no rales. She exhibits no tenderness.   Abdominal: Soft. Bowel sounds are normal. She exhibits no distension. There is tenderness (diffuse). There is no rebound and no guarding.   gastrostomy tube in place   Musculoskeletal: She exhibits no edema or tenderness.   Lymphadenopathy:     She has no cervical adenopathy.   Neurological: She is alert. No cranial nerve deficit.   Skin: Skin is warm and dry. No rash noted. No erythema. No pallor.   Psychiatric: She has a normal mood and affect.   Nursing note and vitals reviewed.                               Assessment/Plan     SBO (small bowel obstruction) due to malignant obstruction/carcinomatosis- (present on admission)   Assessment & Plan    - poor prognosis, nothing more that can be done, not even surgery. Continue PO diet for comfort. Continue pain control with PRN oral roxanol, and MS contin, and antiemetics. Continue famotidine. Gastrostomy tube placed for palliation.          Anal carcinoma (CMS-HCC)- (present on admission)   Assessment & Plan    - pt will go home with hospice, waiting for home services to be arranged.        Chronic pain- (present on admission)   Assessment & Plan    - continue comfort measure with oral roxanol PRN, and MS contin.        Normocytic anemia- (present on admission)    Assessment & Plan    - Chronic, no sign of gross bleeding.        Rectal cancer (CMS-HCC)- (present on admission)   Assessment & Plan    - Patient is on experimental drug as an outpatient. Home hospice services being arranged. Dr. Sue agreed that hospice is appropriate level of care.            Reviewed items::  Labs reviewed, Radiology images reviewed and Medications reviewed  Ivan catheter::  No Ivan  DVT prophylaxis pharmacological::  Enoxaparin (Lovenox)  Ulcer Prophylaxis::  Yes

## 2017-10-28 LAB
C DIFF DNA SPEC QL NAA+PROBE: NEGATIVE
C DIFF TOX A+B STL QL IA: POSITIVE
C DIFF TOX GENS STL QL NAA+PROBE: NORMAL

## 2017-10-28 PROCEDURE — 700111 HCHG RX REV CODE 636 W/ 250 OVERRIDE (IP): Performed by: INTERNAL MEDICINE

## 2017-10-28 PROCEDURE — 87324 CLOSTRIDIUM AG IA: CPT

## 2017-10-28 PROCEDURE — 700102 HCHG RX REV CODE 250 W/ 637 OVERRIDE(OP): Performed by: INTERNAL MEDICINE

## 2017-10-28 PROCEDURE — 99232 SBSQ HOSP IP/OBS MODERATE 35: CPT | Performed by: INTERNAL MEDICINE

## 2017-10-28 PROCEDURE — 700111 HCHG RX REV CODE 636 W/ 250 OVERRIDE (IP): Performed by: SURGERY

## 2017-10-28 PROCEDURE — 770020 HCHG ROOM/CARE - TELE (206)

## 2017-10-28 PROCEDURE — 87493 C DIFF AMPLIFIED PROBE: CPT

## 2017-10-28 PROCEDURE — A9270 NON-COVERED ITEM OR SERVICE: HCPCS | Performed by: INTERNAL MEDICINE

## 2017-10-28 PROCEDURE — 700101 HCHG RX REV CODE 250: Performed by: INTERNAL MEDICINE

## 2017-10-28 RX ADMIN — ONDANSETRON 4 MG: 2 INJECTION INTRAMUSCULAR; INTRAVENOUS at 20:13

## 2017-10-28 RX ADMIN — ONDANSETRON 4 MG: 4 TABLET, ORALLY DISINTEGRATING ORAL at 08:29

## 2017-10-28 RX ADMIN — POTASSIUM CHLORIDE, DEXTROSE MONOHYDRATE AND SODIUM CHLORIDE: 150; 5; 900 INJECTION, SOLUTION INTRAVENOUS at 06:32

## 2017-10-28 RX ADMIN — MORPHINE SULFATE 10 MG: 100 SOLUTION ORAL at 13:04

## 2017-10-28 RX ADMIN — MORPHINE SULFATE 4 MG: 4 INJECTION INTRAVENOUS at 01:08

## 2017-10-28 RX ADMIN — MORPHINE SULFATE 4 MG: 4 INJECTION INTRAVENOUS at 16:02

## 2017-10-28 RX ADMIN — MORPHINE SULFATE 10 MG: 100 SOLUTION ORAL at 20:13

## 2017-10-28 RX ADMIN — ONDANSETRON 4 MG: 2 INJECTION INTRAMUSCULAR; INTRAVENOUS at 16:02

## 2017-10-28 RX ADMIN — ENOXAPARIN SODIUM 40 MG: 100 INJECTION SUBCUTANEOUS at 08:29

## 2017-10-28 RX ADMIN — METOPROLOL TARTRATE 5 MG: 5 INJECTION INTRAVENOUS at 16:53

## 2017-10-28 RX ADMIN — MORPHINE SULFATE 4 MG: 4 INJECTION INTRAVENOUS at 08:29

## 2017-10-28 RX ADMIN — ONDANSETRON 4 MG: 2 INJECTION INTRAMUSCULAR; INTRAVENOUS at 01:07

## 2017-10-28 RX ADMIN — FAMOTIDINE 20 MG: 10 INJECTION, SOLUTION INTRAVENOUS at 08:29

## 2017-10-28 RX ADMIN — ONDANSETRON 4 MG: 4 TABLET, ORALLY DISINTEGRATING ORAL at 12:01

## 2017-10-28 RX ADMIN — FAMOTIDINE 20 MG: 10 INJECTION, SOLUTION INTRAVENOUS at 20:13

## 2017-10-28 RX ADMIN — MORPHINE SULFATE 4 MG: 4 INJECTION INTRAVENOUS at 22:34

## 2017-10-28 ASSESSMENT — ENCOUNTER SYMPTOMS
VOMITING: 0
BACK PAIN: 0
ORTHOPNEA: 0
EYE PAIN: 0
SEIZURES: 0
COUGH: 0
HEADACHES: 0
SHORTNESS OF BREATH: 0
BLURRED VISION: 0
STRIDOR: 0
DIARRHEA: 1
EYE REDNESS: 0
SPUTUM PRODUCTION: 0
INSOMNIA: 0
NERVOUS/ANXIOUS: 0
ABDOMINAL PAIN: 1
FOCAL WEAKNESS: 0
MYALGIAS: 0
EYE DISCHARGE: 0
DIZZINESS: 0
HEARTBURN: 0
WEIGHT LOSS: 0
WEAKNESS: 1
PALPITATIONS: 0
NECK PAIN: 0
NAUSEA: 0
CHILLS: 0
FEVER: 0
DEPRESSION: 0

## 2017-10-28 ASSESSMENT — PAIN SCALES - GENERAL
PAINLEVEL_OUTOF10: 10
PAINLEVEL_OUTOF10: 10
PAINLEVEL_OUTOF10: 8
PAINLEVEL_OUTOF10: 10
PAINLEVEL_OUTOF10: 10
PAINLEVEL_OUTOF10: 9
PAINLEVEL_OUTOF10: 10
PAINLEVEL_OUTOF10: 5
PAINLEVEL_OUTOF10: 8
PAINLEVEL_OUTOF10: 10

## 2017-10-28 NOTE — CARE PLAN
Problem: Communication  Goal: The ability to communicate needs accurately and effectively will improve  Outcome: PROGRESSING AS EXPECTED  POC discussed with pt. And family at bedside. All questions and concerns have been addressed at this time regarding course of treatment. Verbal understanding recieved    Problem: Safety  Goal: Will remain free from falls  Outcome: PROGRESSING AS EXPECTED  Brittany Schultz Fall Risk Assessment:     Last Known Fall: Within the last six months  Mobility: Immobilized/requires assist of one person  Medications: Cardiovascular or central nervous system meds  Mental Status/LOC/Awareness: Awake, alert, and oriented to date, place, and person  Toileting Needs: Use of catheters or diversion devices  Volume/Electrolyte Status: Nausea/vomiting, Use of IV fluids/tube feeds  Communication/Sensory: Visual (Glasses)/hearing deficit  Behavior: Appropriate behavior  Brittany Schultz Fall Risk Total: 15  Fall Risk Level: HIGH RISK    Universal Fall Precautions:  call light/belongings in reach, bed in low position and locked, siderails up x 2, adequate lighting, educate on level of risk, educate to call for assistance    Fall Risk Level Interventions:    TRIAL ([employer] [facility] 8, NEURO, MED JOHNATHON 5) Moderate Fall Risk Interventions  Place yellow fall risk ID band on patient: verified  Provide patient/family education based on risk assessment : completed  Educate patient/family to call staff for assistance when getting out of bed: completed  Place fall precaution signage outside patient door: verified  Utilize bed/chair fall alarm: refusedTRIAL ([employer] 8, NEURO, MED JOHNATHON 5) High Fall Risk Interventions  Place yellow fall risk ID band on patient: verified  Provide patient/family education based on risk assessment: completed  Educate patient/family to call staff for assistance when getting out of bed: completed  Place fall precaution signage outside patient door: verified  Place patient in room close to nursing station:  verified  Utilize bed/chair fall alarm: refused  Notify charge of high risk for huddle: verified    Patient Specific Interventions:     Medication: review medications with patient and family  Mental Status/LOC/Awareness: reorient patient, encourage family to stay with patient and check on patient hourly  Toileting: instruct patient/family on the need to call for assistance when toileting  Volume/Electrolyte Status: ensure patient remains hydrated and monitor abnormal lab values  Communication/Sensory: update plan of care on whiteboard  Behavioral: engage patient in daily activities and administer medication as ordered  Mobility: utilize bed/chair fall alarm, ensure bed is locked and in lowest position, provide appropriate assistive device and instruct patient to exit bed on their strongest side

## 2017-10-28 NOTE — CARE PLAN
Problem: Communication  Goal: The ability to communicate needs accurately and effectively will improve  Outcome: PROGRESSING AS EXPECTED  POC discussed at bedside - patient verbalizes understanding.  Education on medications and procedures provided.   White board updated, patient encouraged to call for all needs. Calls appropriately. No immediate concerns at this time.       Problem: Safety  Goal: Will remain free from falls  Outcome: PROGRESSING AS EXPECTED  Brittany Schultz Fall Risk Assessment:     Last Known Fall: Within the last six months  Mobility: Immobilized/requires assist of one person  Medications: Cardiovascular or central nervous system meds  Mental Status/LOC/Awareness: Awake, alert, and oriented to date, place, and person  Toileting Needs: Use of catheters or diversion devices  Volume/Electrolyte Status: Nausea/vomiting, Use of IV fluids/tube feeds  Communication/Sensory: Visual (Glasses)/hearing deficit  Behavior: Depression/anxiety  Brittany Schultz Fall Risk Total: 16  Fall Risk Level: HIGH RISK    Universal Fall Precautions:  call light/belongings in reach, bed in low position and locked, wheelchairs and assistive devices out of sight, siderails up x 2, use non-slip footwear, adequate lighting, clutter free and spill free environment, educate on level of risk, educate to call for assistance    Fall Risk Level Interventions:    TRIAL (TELE 8, NEURO, MED JOHNATHON 5) Moderate Fall Risk Interventions  Place yellow fall risk ID band on patient: verified  Provide patient/family education based on risk assessment : completed  Educate patient/family to call staff for assistance when getting out of bed: completed  Place fall precaution signage outside patient door: verified  Utilize bed/chair fall alarm: refusedTRIAL (TELE 8, NEURO, MED JOHNATHON 5) High Fall Risk Interventions  Place yellow fall risk ID band on patient: verified  Provide patient/family education based on risk assessment: completed  Educate  patient/family to call staff for assistance when getting out of bed: completed  Place fall precaution signage outside patient door: verified  Place patient in room close to nursing station: verified  Utilize bed/chair fall alarm: refused  Notify charge of high risk for huddle: verified    Patient Specific Interventions:     Medication: review medications with patient and family and limit combination of prn medications  Mental Status/LOC/Awareness: reinforce falls education, encourage family to stay with patient, check on patient hourly, utilize bed/chair fall alarm, reinforce the use of call light and provide activity  Toileting: consider obtaining elevated toilet seat or bedside commode (BSC), provide frquent toileting and monitor intake and output/use of appropriate interventions  Volume/Electrolyte Status: ensure patient remains hydrated, advance diet as tolerated, administer medications as ordered for nausea and vomiting, monitor abnormal lab values and ensure IV fluids are appropriate  Communication/Sensory: update plan of care on whiteboard  Behavioral: encourage patient to voice feelings, engage patient in daily activities and instruct/reinforce fall program rationale  Mobility: provide comfort measures during transport, dangle prior to standing, ensure bed is locked and in lowest position, provide appropriate assistive device and instruct patient to exit bed on their strongest side    Problem: Bowel/Gastric:  Goal: Normal bowel function is maintained or improved  Outcome: PROGRESSING SLOWER THAN EXPECTED    Intervention: Educate patient and significant other/support system about diet, fluid intake, medications and activity to promote bowel function  Education provided. Patient diet advanced. Cdiff sample sent to [facility] d/t frequent water, foul stools

## 2017-10-28 NOTE — FACE TO FACE
Face to Face Supporting Documentation - Home Health    The encounter with this patient was in whole or in part the primary reason for home health admission.    Date of encounter:   Patient:                    MRN:                       YOB: 2019  Marilyn Cox Strange  [id number]  1955     Home health to see patient for:  Physical Therapy evaluation and treatment    Skilled need for:  Recent Deterioration of Health Status bowel obstruction from advanced cancer    Skilled nursing interventions to include:  Comment: bowel obstruction from advanced cancer    Homebound status evidenced by:  Need the aid of supportive devices such as crutches, canes, wheelchairs or walkers. Leaving home requires a considerable and taxing effort. There is a normal inability to leave the home.    Community Physician to provide follow up care: REMY Del Rio     Optional Interventions? No      I certify the face to face encounter for this home health care referral meets the CMS requirements and the encounter/clinical assessment with the patient was, in whole, or in part, for the medical condition(s) listed above, which is the primary reason for home health care. Based on my clinical findings: the service(s) are medically necessary, support the need for home health care, and the homebound criteria are met.  I certify that this patient has had a face to face encounter by myself.  James You M.D. - NPI: [id number]

## 2017-10-28 NOTE — PROGRESS NOTES
[facility]ist Progress Note    Date of Service: 10/28/2017    Chief Complaint  62 y.o. Female with multiple cancers including breast, rectal with liver mets, and cervical,  admitted 10/12/2017 with abdominal pain. Found to have SBO. Initial conservative management failed, and thus surgery was consulted and had laparoscopy which showed carcinomatosis and malignant obstruction on the RLQ. No further surgical intervention done intra op, and gastrostomy tube was placed. Palliative care was consulted, and patient subsequently opted for hospice. Started on comfort measures. SW working on arranging home hospice.     Interval Problem Update  10/27/2017 - uneventful night. VSS. Afebrile. Saturating well on 1L O2 NC. CM/SW continuing to work with insurance/hospice agencies to get patient home with hospice.     10/28: still feeling bloated. Having diarrhea. Will check C-diff.        Consultants/Specialty  Surgery  Oncology    Disposition  Home hospice being arranged.         Review of Systems   Constitutional: Positive for malaise/fatigue. Negative for chills, fever and weight loss.   HENT: Negative for congestion and nosebleeds.    Eyes: Negative for blurred vision, pain, discharge and redness.   Respiratory: Negative for cough, sputum production, shortness of breath and stridor.    Cardiovascular: Negative for chest pain, palpitations and orthopnea.   Gastrointestinal: Positive for abdominal pain and diarrhea. Negative for heartburn, nausea and vomiting.   Genitourinary: Negative for dysuria, frequency and urgency.   Musculoskeletal: Negative for back pain, myalgias and neck pain.   Skin: Negative for itching and rash.   Neurological: Positive for weakness. Negative for dizziness, focal weakness, seizures and headaches.   Psychiatric/Behavioral: Negative for depression. The patient is not nervous/anxious and does not have insomnia.         Pertinent positives/negatives as mentioned above.     A complete review of  systems was done. All other systems were negative.      Physical Exam  Laboratory/Imaging   Hemodynamics  Temp (24hrs), Av.2 °C (99 °F), Min:36.4 °C (97.5 °F), Max:38 °C (100.4 °F)   Temperature: 37.2 °C (99 °F)  Pulse  Av.8  Min: 75  Max: 142    Blood Pressure: 140/67      Respiratory      Respiration: 18, Pulse Oximetry: 97 %        RUL Breath Sounds: Diminished, RML Breath Sounds: Diminished, RLL Breath Sounds: Diminished, MERE Breath Sounds: Diminished, LLL Breath Sounds: Diminished    Fluids    Intake/Output Summary (Last 24 hours) at 10/28/17 1226  Last data filed at 10/28/17 0820   Gross per 24 hour   Intake              570 ml   Output             1800 ml   Net            -1230 ml       Nutrition  Orders Placed This Encounter   Procedures   • DIET ORDER     Standing Status:   Standing     Number of Occurrences:   1     Order Specific Question:   Diet:     Answer:   Low Fiber(GI Soft) [2]     Comments:   as tolerated     Physical Exam   Constitutional: She appears well-developed and well-nourished. No distress.   HENT:   Head: Normocephalic and atraumatic.   Mouth/Throat: No oropharyngeal exudate.   Eyes: Conjunctivae are normal. Pupils are equal, round, and reactive to light.   Neck: Normal range of motion. Neck supple.   Cardiovascular: Normal rate and regular rhythm.  Exam reveals no gallop and no friction rub.    No murmur heard.  Pulmonary/Chest: Effort normal and breath sounds normal. No respiratory distress. She has no wheezes.   Abdominal: Soft. Bowel sounds are normal. She exhibits no distension. There is tenderness (diffuse). There is no rebound.   gastrostomy tube in place   Musculoskeletal: She exhibits no edema or tenderness.   Neurological: She is alert. No cranial nerve deficit.   Skin: Skin is warm and dry. No erythema.   Psychiatric: She has a normal mood and affect.   Nursing note and vitals reviewed.                               Assessment/Plan     SBO (small bowel obstruction) due  to malignant obstruction/carcinomatosis- (present on admission)   Assessment & Plan    - poor prognosis, nothing more that can be done, not even surgery.   - Continue PO diet for comfort. Continue pain control with PRN oral roxanol, and MS contin, and antiemetics.   - Continue famotidine. Gastrostomy tube placed for palliation.          Anal carcinoma (CMS-HCC)- (present on admission)   Assessment & Plan    - pt will go home with hospice, waiting for home services to be arranged.        Chronic pain- (present on admission)   Assessment & Plan    - continue comfort measure with oral roxanol PRN, and MS contin.        Normocytic anemia- (present on admission)   Assessment & Plan    - Chronic, no sign of gross bleeding.        Rectal cancer (CMS-HCC)- (present on admission)   Assessment & Plan    - Patient is on experimental drug as an outpatient. Home hospice services being arranged. Dr. Sue agreed that hospice is appropriate level of care.            Reviewed items::  Labs reviewed, Radiology images reviewed and Medications reviewed  Ivan catheter::  No Ivan  DVT prophylaxis pharmacological::  Enoxaparin (Lovenox)  Ulcer Prophylaxis::  Yes

## 2017-10-28 NOTE — PROGRESS NOTES
Assumed care of pt. at 1900    Bedside report received from LOLA Vargas   POC discussed with pt. At bedside and Pt. verbalizes understanding.  Pt. Is Awake and AOx 4, with family at bedside  Call light within reach  with appropriate instructions to call for assistance  Bed locked and in lowest position.

## 2017-10-29 PROBLEM — A49.8 CLOSTRIDIUM DIFFICILE INFECTION: Status: ACTIVE | Noted: 2017-10-29

## 2017-10-29 PROCEDURE — 700111 HCHG RX REV CODE 636 W/ 250 OVERRIDE (IP): Performed by: INTERNAL MEDICINE

## 2017-10-29 PROCEDURE — 770021 HCHG ROOM/CARE - ISO PRIVATE

## 2017-10-29 PROCEDURE — 700111 HCHG RX REV CODE 636 W/ 250 OVERRIDE (IP): Performed by: SURGERY

## 2017-10-29 PROCEDURE — 700101 HCHG RX REV CODE 250: Performed by: INTERNAL MEDICINE

## 2017-10-29 PROCEDURE — 99232 SBSQ HOSP IP/OBS MODERATE 35: CPT | Performed by: INTERNAL MEDICINE

## 2017-10-29 PROCEDURE — 700102 HCHG RX REV CODE 250 W/ 637 OVERRIDE(OP): Performed by: INTERNAL MEDICINE

## 2017-10-29 PROCEDURE — A9270 NON-COVERED ITEM OR SERVICE: HCPCS | Performed by: INTERNAL MEDICINE

## 2017-10-29 RX ADMIN — MORPHINE SULFATE 10 MG: 100 SOLUTION ORAL at 08:58

## 2017-10-29 RX ADMIN — ONDANSETRON 4 MG: 2 INJECTION INTRAMUSCULAR; INTRAVENOUS at 08:58

## 2017-10-29 RX ADMIN — MORPHINE SULFATE 10 MG: 100 SOLUTION ORAL at 15:44

## 2017-10-29 RX ADMIN — ENOXAPARIN SODIUM 40 MG: 100 INJECTION SUBCUTANEOUS at 08:58

## 2017-10-29 RX ADMIN — ONDANSETRON 4 MG: 2 INJECTION INTRAMUSCULAR; INTRAVENOUS at 17:29

## 2017-10-29 RX ADMIN — POTASSIUM CHLORIDE, DEXTROSE MONOHYDRATE AND SODIUM CHLORIDE: 150; 5; 900 INJECTION, SOLUTION INTRAVENOUS at 04:41

## 2017-10-29 RX ADMIN — VANCOMYCIN HYDROCHLORIDE 125 MG: 10 INJECTION, POWDER, LYOPHILIZED, FOR SOLUTION INTRAVENOUS at 13:18

## 2017-10-29 RX ADMIN — VANCOMYCIN HYDROCHLORIDE 125 MG: 10 INJECTION, POWDER, LYOPHILIZED, FOR SOLUTION INTRAVENOUS at 23:52

## 2017-10-29 RX ADMIN — MORPHINE SULFATE 10 MG: 100 SOLUTION ORAL at 03:30

## 2017-10-29 RX ADMIN — FAMOTIDINE 20 MG: 10 INJECTION, SOLUTION INTRAVENOUS at 21:49

## 2017-10-29 RX ADMIN — METOPROLOL TARTRATE 5 MG: 5 INJECTION INTRAVENOUS at 04:40

## 2017-10-29 RX ADMIN — MORPHINE SULFATE 10 MG: 100 SOLUTION ORAL at 23:52

## 2017-10-29 RX ADMIN — MORPHINE SULFATE 4 MG: 4 INJECTION INTRAVENOUS at 06:39

## 2017-10-29 RX ADMIN — MORPHINE SULFATE 4 MG: 4 INJECTION INTRAVENOUS at 20:40

## 2017-10-29 RX ADMIN — ONDANSETRON 4 MG: 2 INJECTION INTRAMUSCULAR; INTRAVENOUS at 03:37

## 2017-10-29 RX ADMIN — ONDANSETRON 4 MG: 2 INJECTION INTRAMUSCULAR; INTRAVENOUS at 13:18

## 2017-10-29 RX ADMIN — MORPHINE SULFATE 10 MG: 100 SOLUTION ORAL at 00:14

## 2017-10-29 RX ADMIN — MORPHINE SULFATE 10 MG: 100 SOLUTION ORAL at 12:36

## 2017-10-29 RX ADMIN — VANCOMYCIN HYDROCHLORIDE 125 MG: 10 INJECTION, POWDER, LYOPHILIZED, FOR SOLUTION INTRAVENOUS at 18:32

## 2017-10-29 RX ADMIN — ONDANSETRON 4 MG: 4 TABLET, ORALLY DISINTEGRATING ORAL at 23:52

## 2017-10-29 RX ADMIN — PROCHLORPERAZINE EDISYLATE 10 MG: 5 INJECTION INTRAMUSCULAR; INTRAVENOUS at 21:00

## 2017-10-29 RX ADMIN — MORPHINE SULFATE 4 MG: 4 INJECTION INTRAVENOUS at 13:18

## 2017-10-29 RX ADMIN — FAMOTIDINE 20 MG: 10 INJECTION, SOLUTION INTRAVENOUS at 08:58

## 2017-10-29 ASSESSMENT — COGNITIVE AND FUNCTIONAL STATUS - GENERAL
MOVING FROM LYING ON BACK TO SITTING ON SIDE OF FLAT BED: A LOT
TURNING FROM BACK TO SIDE WHILE IN FLAT BAD: A LOT
MOVING TO AND FROM BED TO CHAIR: A LOT
CLIMB 3 TO 5 STEPS WITH RAILING: A LOT
SUGGESTED CMS G CODE MODIFIER DAILY ACTIVITY: CK
EATING MEALS: A LITTLE
SUGGESTED CMS G CODE MODIFIER MOBILITY: CL
WALKING IN HOSPITAL ROOM: A LOT
DRESSING REGULAR UPPER BODY CLOTHING: A LOT
PERSONAL GROOMING: A LITTLE
DRESSING REGULAR LOWER BODY CLOTHING: A LOT
HELP NEEDED FOR BATHING: A LOT
DAILY ACTIVITIY SCORE: 14
STANDING UP FROM CHAIR USING ARMS: A LOT
TOILETING: A LOT
MOBILITY SCORE: 12

## 2017-10-29 ASSESSMENT — PAIN SCALES - GENERAL
PAINLEVEL_OUTOF10: 10
PAINLEVEL_OUTOF10: 6
PAINLEVEL_OUTOF10: 10
PAINLEVEL_OUTOF10: 6
PAINLEVEL_OUTOF10: 8
PAINLEVEL_OUTOF10: 8
PAINLEVEL_OUTOF10: 6
PAINLEVEL_OUTOF10: 5
PAINLEVEL_OUTOF10: 6
PAINLEVEL_OUTOF10: 6
PAINLEVEL_OUTOF10: 9
PAINLEVEL_OUTOF10: 6
PAINLEVEL_OUTOF10: 6
PAINLEVEL_OUTOF10: 10
PAINLEVEL_OUTOF10: 4
PAINLEVEL_OUTOF10: 7

## 2017-10-29 ASSESSMENT — ENCOUNTER SYMPTOMS
MYALGIAS: 0
HEADACHES: 0
WEIGHT LOSS: 0
NERVOUS/ANXIOUS: 0
COUGH: 0
PALPITATIONS: 0
NECK PAIN: 0
CHILLS: 0
EYE PAIN: 0
SEIZURES: 0
NAUSEA: 0
HEARTBURN: 0
DIARRHEA: 1
BLURRED VISION: 0
WEAKNESS: 1
DIZZINESS: 0
ABDOMINAL PAIN: 1
VOMITING: 0
DEPRESSION: 0
SHORTNESS OF BREATH: 0
FEVER: 0
SPUTUM PRODUCTION: 0
EYE DISCHARGE: 0
FOCAL WEAKNESS: 0

## 2017-10-29 NOTE — PROGRESS NOTES
[facility]ist Progress Note    Date of Service: 10/29/2017    Chief Complaint  62 y.o. Female with multiple cancers including breast, rectal with liver mets, and cervical,  admitted 10/12/2017 with abdominal pain. Found to have SBO. Initial conservative management failed, and thus surgery was consulted and had laparoscopy which showed carcinomatosis and malignant obstruction on the RLQ. No further surgical intervention done intra op, and gastrostomy tube was placed. Palliative care was consulted, and patient subsequently opted for hospice. Started on comfort measures. SW working on arranging home hospice.     Interval Problem Update  10/27/2017 - uneventful night. VSS. Afebrile. Saturating well on 1L O2 NC. CM/SW continuing to work with insurance/hospice agencies to get patient home with hospice.     10/28: still feeling bloated. Having diarrhea. Will check C-diff.    10/29: feeling a little better. But having leakage from IR drain site.    Consultants/Specialty  Surgery  Oncology    Disposition  Home hospice being arranged.         Review of Systems   Constitutional: Positive for malaise/fatigue. Negative for chills, fever and weight loss.   HENT: Negative for congestion and nosebleeds.    Eyes: Negative for blurred vision, pain and discharge.   Respiratory: Negative for cough, sputum production and shortness of breath.    Cardiovascular: Negative for chest pain and palpitations.   Gastrointestinal: Positive for abdominal pain and diarrhea. Negative for heartburn, nausea and vomiting.   Genitourinary: Negative for dysuria and urgency.   Musculoskeletal: Negative for myalgias and neck pain.   Skin: Negative for itching and rash.   Neurological: Positive for weakness. Negative for dizziness, focal weakness, seizures and headaches.   Psychiatric/Behavioral: Negative for depression. The patient is not nervous/anxious.         Pertinent positives/negatives as mentioned above.     A complete review of systems was  done. All other systems were negative.      Physical Exam  Laboratory/Imaging   Hemodynamics  Temp (24hrs), Av.6 °C (97.9 °F), Min:36.3 °C (97.3 °F), Max:36.9 °C (98.4 °F)   Temperature: 36.6 °C (97.8 °F)  Pulse  Av.8  Min: 75  Max: 142    Blood Pressure: 136/63      Respiratory      Respiration: 15, Pulse Oximetry: 92 %        RUL Breath Sounds: Diminished, RML Breath Sounds: Diminished, RLL Breath Sounds: Diminished, MERE Breath Sounds: Diminished, LLL Breath Sounds: Diminished    Fluids    Intake/Output Summary (Last 24 hours) at 10/29/17 0835  Last data filed at 10/28/17 2300   Gross per 24 hour   Intake              800 ml   Output             2100 ml   Net            -1300 ml       Nutrition  Orders Placed This Encounter   Procedures   • DIET ORDER     Standing Status:   Standing     Number of Occurrences:   1     Order Specific Question:   Diet:     Answer:   Low Fiber(GI Soft) [2]     Comments:   as tolerated     Physical Exam   Constitutional: She appears well-developed and well-nourished. No distress.   HENT:   Head: Normocephalic and atraumatic.   Mouth/Throat: No oropharyngeal exudate.   Eyes: Pupils are equal, round, and reactive to light.   Neck: Normal range of motion.   Cardiovascular: Normal rate and regular rhythm.  Exam reveals no gallop and no friction rub.    No murmur heard.  Pulmonary/Chest: Effort normal. No respiratory distress. She has no wheezes.   Abdominal: Soft. Bowel sounds are normal. She exhibits no distension. There is tenderness (diffuse). There is no rebound.   gastrostomy tube in place   Musculoskeletal: She exhibits no edema or tenderness.   Neurological: She is alert. No cranial nerve deficit.   Skin: Skin is warm and dry. No erythema.   Psychiatric: She has a normal mood and affect.   Nursing note and vitals reviewed.                               Assessment/Plan     SBO (small bowel obstruction) due to malignant obstruction/carcinomatosis- (present on admission)    Assessment & Plan    - poor prognosis, nothing more that can be done, not even surgery.   - Continue PO diet for comfort. Continue pain control with PRN oral roxanol, and MS contin, and antiemetics.   - Continue famotidine. Gastrostomy tube placed for palliation.          Anal carcinoma (CMS-HCC)- (present on admission)   Assessment & Plan    - pt will go home with hospice, waiting for home services to be arranged.        Clostridium difficile infection   Assessment & Plan    On PO vancomycin        Chronic pain- (present on admission)   Assessment & Plan    - continue comfort measure with oral roxanol PRN, and MS contin.        Normocytic anemia- (present on admission)   Assessment & Plan    - Chronic, no sign of gross bleeding.        Rectal cancer (CMS-[facility])- (present on admission)   Assessment & Plan    - Patient is on experimental drug as an outpatient. Home hospice services being arranged. Dr. Sue agreed that hospice is appropriate level of care.            Reviewed items::  Labs reviewed, Radiology images reviewed and Medications reviewed  Ivan catheter::  No Ivan  DVT prophylaxis pharmacological::  Enoxaparin (Lovenox)  Ulcer Prophylaxis::  Yes

## 2017-10-29 NOTE — PROGRESS NOTES
Bedside report received. Patient A&O x4. Pt is fatigued. 1L NC. SR/ST on monitor. Complains of pain 10/10 in abdomen medicated per MAR. Heat packs also offered. Pt has G Tube in left upper quadrant.  Presents with scant leakage. MD is aware. Pt on isolation precautions for rule out C. Diff.  POC discussed with patient. Patient verbalized understanding. Call light and belongings within reach. Bed locked and in lowest position, alarm and fall precautions in place.

## 2017-10-29 NOTE — PROGRESS NOTES
Brittany Schultz Fall Risk Assessment:     Last Known Fall: Within the last six months  Mobility: Immobilized/requires assist of one person  Medications: Cardiovascular or central nervous system meds  Mental Status/LOC/Awareness: Awake, alert, and oriented to date, place, and person  Toileting Needs: Use of catheters or diversion devices  Volume/Electrolyte Status: Use of IV fluids/tube feeds  Communication/Sensory: Visual (Glasses)/hearing deficit  Behavior: Appropriate behavior  Brittany Schultz Fall Risk Total: 12  Fall Risk Level: MODERATE RISK    Universal Fall Precautions:  call light/belongings in reach, bed in low position and locked, siderails up x 2, use non-slip footwear, adequate lighting, clutter free and spill free environment, educate to call for assistance    Fall Risk Level Interventions:    TRIAL ([employer], NEURO, MED JOHNATHON 5) Moderate Fall Risk Interventions  Place yellow fall risk ID band on patient: verified  Provide patient/family education based on risk assessment : completed  Educate patient/family to call staff for assistance when getting out of bed: completed  Place fall precaution signage outside patient door: verified  Utilize bed/chair fall alarm: verifiedTRIAL ([employer], NEURO, MED JOHNATHON 5) High Fall Risk Interventions  Place yellow fall risk ID band on patient: verified  Provide patient/family education based on risk assessment: completed  Educate patient/family to call staff for assistance when getting out of bed: completed  Place fall precaution signage outside patient door: verified  Place patient in room close to nursing station: verified  Utilize bed/chair fall alarm: refused  Notify charge of high risk for huddle: verified    Patient Specific Interventions:     Medication: review medications with patient and family, assess for medications that can be discontinued or dosage decreased, limit combination of prn medications and assess need for orthostatic hypotension evaluation  Mental  Status/LOC/Awareness: reinforce falls education, check on patient hourly, utilize bed/chair fall alarm and reinforce the use of call light  Toileting: provide frquent toileting, monitor intake and output/use of appropriate interventions, instruct male patients prone to dizziness to void while sitting, instruct patient/family on the need to call for assistance when toileting and do not leave patient unattended in bathroom/refer to toileting scripting  Volume/Electrolyte Status: ensure patient remains hydrated, advance diet as tolerated, administer medications as ordered for nausea and vomiting, monitor abnormal lab values and ensure IV fluids are appropriate  Communication/Sensory: update plan of care on whiteboard  Behavioral: administer medication as ordered and instruct/reinforce fall program rationale  Mobility: utilize bed/chair fall alarm, ensure bed is locked and in lowest position and provide appropriate assistive device

## 2017-10-29 NOTE — CARE PLAN
Problem: Bowel/Gastric:  Goal: Normal bowel function is maintained or improved  Outcome: PROGRESSING AS EXPECTED  Pt has G tube. G tube output is being monitored using collection bag.  G tube is being flushed per MD orders when pt feels bloated.     Problem: Pain Management  Goal: Pain level will decrease to patient's comfort goal  Outcome: PROGRESSING AS EXPECTED  Pt receiving pain medication per MAR. Pt c/o 6-10/10 pain in abdomen.  Heat packs were also offered.

## 2017-10-29 NOTE — PROGRESS NOTES
Brittany Schultz Fall Risk Assessment:     Last Known Fall: Within the last six months  Mobility: Immobilized/requires assist of one person  Medications: Cardiovascular or central nervous system meds  Mental Status/LOC/Awareness: Awake, alert, and oriented to date, place, and person  Toileting Needs: Use of catheters or diversion devices  Volume/Electrolyte Status: Use of IV fluids/tube feeds  Communication/Sensory: Visual (Glasses)/hearing deficit  Behavior: Appropriate behavior  Brittany Schultz Fall Risk Total: 12  Fall Risk Level: MODERATE RISK    Universal Fall Precautions:  call light/belongings in reach, bed in low position and locked, wheelchairs and assistive devices out of sight, siderails up x 2, use non-slip footwear, adequate lighting, clutter free and spill free environment, educate on level of risk, educate to call for assistance    Fall Risk Level Interventions:    TRIAL ([employer] 8, NEURO, MED JOHNATHON 5) Moderate Fall Risk Interventions  Place yellow fall risk ID band on patient: verified  Provide patient/family education based on risk assessment : verified  Educate patient/family to call staff for assistance when getting out of bed: verified  Place fall precaution signage outside patient door: verified  Utilize bed/chair fall alarm: verifiedTRIAL ([employer], NEURO, MED JOHNATHON 5) High Fall Risk Interventions  Place yellow fall risk ID band on patient: verified  Provide patient/family education based on risk assessment: completed  Educate patient/family to call staff for assistance when getting out of bed: completed  Place fall precaution signage outside patient door: verified  Place patient in room close to nursing station: verified  Utilize bed/chair fall alarm: refused  Notify charge of high risk for huddle: verified    Patient Specific Interventions:     Medication: review medications with patient and family, assess for medications that can be discontinued or dosage decreased, limit combination of prn  medications and assess need for orthostatic hypotension evaluation  Mental Status/LOC/Awareness: reinforce falls education, encourage family to stay with patient, check on patient hourly, utilize bed/chair fall alarm, reinforce the use of call light and provide activity  Toileting: monitor intake and output/use of appropriate interventions, instruct patient/family on the use of grab bars, instruct patient/family on the need to call for assistance when toileting and do not leave patient unattended in bathroom/refer to toileting scripting  Volume/Electrolyte Status: ensure patient remains hydrated, advance diet as tolerated, administer medications as ordered for nausea and vomiting, monitor abnormal lab values and ensure IV fluids are appropriate  Communication/Sensory: update plan of care on whiteboard, ensure proper positioning when transferrng/ambulating, ensure patient has glasses/contacts and hearing aids/dentures and for visually impaired patients orient to their room surrounding and do not change their surroundings  Behavioral: encourage patient to voice feelings, engage patient in daily activities, administer medication as ordered and instruct/reinforce fall program rationale  Mobility: dangle prior to standing, utilize bed/chair fall alarm, ensure bed is locked and in lowest position, provide appropriate assistive device and instruct patient to exit bed on their strongest side

## 2017-10-29 NOTE — CARE PLAN
Problem: Bowel/Gastric:  Goal: Will not experience complications related to bowel motility  Outcome: PROGRESSING SLOWER THAN EXPECTED  Pt educated on the use of stool softeners to aide in bowel maintenance and the importance of regular bowel movements. Pt still has trouble with bowel elimination.     Problem: Fluid Volume:  Goal: Will maintain balanced intake and output  Outcome: PROGRESSING SLOWER THAN EXPECTED  Pt diet is still decreased with an average of 30-50% consumed per meal. Pt me be candidate for meal supplements.

## 2017-10-29 NOTE — PROGRESS NOTES
"Report received at 7:00 AM from NOC LOLA Horton, pt is in bed, awake and alert with no report of pain, some mild onset of nausea but understands that nausea medicine in not yet available and is comfortable waiting. G-tube is leaking, gauze, towels and pt gown changed do to getting soiled. Pt states that she does not want and will refuse any PO/tablet form of medication, and requests \"only IV meds please\". Bed is locked in lowest position with call light, belongings within reach, white board updated, and POC discussed. All needs met at this time.   "

## 2017-10-30 ENCOUNTER — DOCUMENTATION (OUTPATIENT)
Dept: HEALTH INFORMATION MANAGEMENT | Facility: OTHER | Age: 62
End: 2017-10-30

## 2017-10-30 PROBLEM — Z00.6 RESEARCH STUDY PATIENT: Status: RESOLVED | Noted: 2017-05-11 | Resolved: 2017-10-30

## 2017-10-30 PROCEDURE — 700111 HCHG RX REV CODE 636 W/ 250 OVERRIDE (IP): Performed by: INTERNAL MEDICINE

## 2017-10-30 PROCEDURE — 700102 HCHG RX REV CODE 250 W/ 637 OVERRIDE(OP): Performed by: INTERNAL MEDICINE

## 2017-10-30 PROCEDURE — A9270 NON-COVERED ITEM OR SERVICE: HCPCS | Performed by: INTERNAL MEDICINE

## 2017-10-30 PROCEDURE — 770021 HCHG ROOM/CARE - ISO PRIVATE

## 2017-10-30 PROCEDURE — 99232 SBSQ HOSP IP/OBS MODERATE 35: CPT | Performed by: INTERNAL MEDICINE

## 2017-10-30 PROCEDURE — 700111 HCHG RX REV CODE 636 W/ 250 OVERRIDE (IP): Performed by: SURGERY

## 2017-10-30 PROCEDURE — 700101 HCHG RX REV CODE 250: Performed by: INTERNAL MEDICINE

## 2017-10-30 RX ADMIN — FAMOTIDINE 20 MG: 10 INJECTION, SOLUTION INTRAVENOUS at 20:21

## 2017-10-30 RX ADMIN — MORPHINE SULFATE 10 MG: 100 SOLUTION ORAL at 20:21

## 2017-10-30 RX ADMIN — FAMOTIDINE 20 MG: 10 INJECTION, SOLUTION INTRAVENOUS at 09:27

## 2017-10-30 RX ADMIN — MORPHINE SULFATE 10 MG: 100 SOLUTION ORAL at 16:20

## 2017-10-30 RX ADMIN — VANCOMYCIN HYDROCHLORIDE 125 MG: 10 INJECTION, POWDER, LYOPHILIZED, FOR SOLUTION INTRAVENOUS at 12:40

## 2017-10-30 RX ADMIN — POTASSIUM CHLORIDE, DEXTROSE MONOHYDRATE AND SODIUM CHLORIDE: 150; 5; 900 INJECTION, SOLUTION INTRAVENOUS at 00:20

## 2017-10-30 RX ADMIN — POTASSIUM CHLORIDE, DEXTROSE MONOHYDRATE AND SODIUM CHLORIDE: 150; 5; 900 INJECTION, SOLUTION INTRAVENOUS at 20:20

## 2017-10-30 RX ADMIN — VANCOMYCIN HYDROCHLORIDE 125 MG: 10 INJECTION, POWDER, LYOPHILIZED, FOR SOLUTION INTRAVENOUS at 06:43

## 2017-10-30 RX ADMIN — VANCOMYCIN HYDROCHLORIDE 125 MG: 10 INJECTION, POWDER, LYOPHILIZED, FOR SOLUTION INTRAVENOUS at 18:02

## 2017-10-30 RX ADMIN — ONDANSETRON 4 MG: 4 TABLET, ORALLY DISINTEGRATING ORAL at 18:02

## 2017-10-30 RX ADMIN — ENOXAPARIN SODIUM 40 MG: 100 INJECTION SUBCUTANEOUS at 09:27

## 2017-10-30 RX ADMIN — MORPHINE SULFATE 10 MG: 100 SOLUTION ORAL at 09:27

## 2017-10-30 RX ADMIN — MORPHINE SULFATE 10 MG: 100 SOLUTION ORAL at 05:51

## 2017-10-30 RX ADMIN — ONDANSETRON 4 MG: 4 TABLET, ORALLY DISINTEGRATING ORAL at 05:51

## 2017-10-30 RX ADMIN — MORPHINE SULFATE 10 MG: 100 SOLUTION ORAL at 12:40

## 2017-10-30 RX ADMIN — ONDANSETRON 4 MG: 4 TABLET, ORALLY DISINTEGRATING ORAL at 12:40

## 2017-10-30 ASSESSMENT — PAIN SCALES - GENERAL
PAINLEVEL_OUTOF10: 8
PAINLEVEL_OUTOF10: 6
PAINLEVEL_OUTOF10: 6
PAINLEVEL_OUTOF10: 9
PAINLEVEL_OUTOF10: 5
PAINLEVEL_OUTOF10: 10
PAINLEVEL_OUTOF10: 8
PAINLEVEL_OUTOF10: 5
PAINLEVEL_OUTOF10: 8

## 2017-10-30 NOTE — DISCHARGE PLANNING
Medical Social Work    Ongoing: This  placed another call to [facility].  Per [facility] they still are awaiting insurance auth.  This  informed [facility] that pt is medically cleared and has been for a few days.  [facility] assured this  that they are working on it and checking with Medicaid frequently.    Plan: SW to await call from [facility] with insurance auth

## 2017-10-30 NOTE — DISCHARGE PLANNING
Medical Social Work    Ongoing: This  called [facility] again this afternoon to see if there was any update regarding pt's insurance approval.  Per [facility] they are diligently following up with insurance and will call this  as soon they get approval.    Plan: SW to await call from [facility] with insurance auth

## 2017-10-30 NOTE — PROGRESS NOTES
Patient arrived to floor via transport.  Resting comfortably in bed, no acute distress.  Special contact precautions for c-diff.  A&O x4.  VSS.  SpO2 98% on 2L NC.    Denies N/V or bloating.   States pain 8/10 in mid abdomen, previously medicated before transfer.  Will medicate according to MAR.  R chest port, dressing CDI.  IVF infusing 50 ml/hr.  Abdomen rounded, distended, semi-firm and tender to palpation.  Old healing lap sites, approximated.  Hyperactive BS.  LUQ G-tube to gravity, dressing CDI.   Last BM 10/28/17.  Ivan to gravity for hospice care.  2+ pitting edema to BLE, pedal pulses 1+.   Call light and personal belongings within reach.  POC discussed and all questions answered.  No additional needs at this time.     2 RN skin assessment completed.  Scabbed sore on left inner greater toe.  Moisture fissure on sacrum, sacrum purple and blanchable. Open sore on right buttock, no drainage.  Wound bed pink.  Patient refusing mepilex at this time. Pictures being uploaded into Epic.    Patient refusing bed alarm at this time.  Patient educated on importance of calling before getting out of bed or if any assistance is needed.  Patient verbally demonstrates understanding.

## 2017-10-30 NOTE — PROGRESS NOTES
Assumed care of patient at 0700. Patient is alert and oriented, respirations are unlabored and regular, patient sitting up in bed at this time, pain stated at 8 out of 10, appropriate pain medication administered. +bowel sounds, loose BM 10/30, G tube to HEATHER abdomen to gravity, large amount of greenish output, olmos in place, clear yellow output. Moisture fissure to sacrum and stage 2 wound to right buttock, barrier cream applied, scab to left great toe. +3 edema to bilateral lower extremities. Bed in lowest position, call light within reach, patient states no needs at this time.

## 2017-10-30 NOTE — PROGRESS NOTES
[facility]ist Progress Note    Date of Service: 10/30/2017    Chief Complaint  62 y.o. Female with multiple cancers including breast, rectal with liver mets, and cervical,  admitted 10/12/2017 with abdominal pain. Found to have SBO. Initial conservative management failed, and thus surgery was consulted and had laparoscopy which showed carcinomatosis and malignant obstruction on the RLQ. No further surgical intervention done intra op, and gastrostomy tube was placed. Palliative care was consulted, and patient subsequently opted for hospice. Started on comfort measures. SW working on arranging home hospice.     Interval Problem Update  10/30/2017 - no overnight events. Remains hemodynamically stable and afebrile. Stable on 2L O2 NC.  Stool C diff PCR and toxin positive, negative NAP1-BI, started on PO vancomycin.      > Seen and examined. No complaints of pain. (+) occasional nausea, but no vomiting. Stools still runny. No CP, SOB, abd pain.       Consultants/Specialty  Surgery  Oncology    Disposition  Monitor on the [facility]. Await home hospice to be arranged.       ROS     Pertinent positives/negatives as mentioned above.     A complete review of systems was done. All other systems were negative.      Physical Exam  Laboratory/Imaging   Hemodynamics  Temp (24hrs), Av.3 °C (97.4 °F), Min:35.9 °C (96.7 °F), Max:36.7 °C (98 °F)   Temperature: 36.7 °C (98 °F)  Pulse  Av.6  Min: 75  Max: 142    Blood Pressure: 130/71      Respiratory      Respiration: 18, Pulse Oximetry: 100 %        RUL Breath Sounds: Diminished, RML Breath Sounds: Diminished, RLL Breath Sounds: Diminished, MERE Breath Sounds: Diminished, LLL Breath Sounds: Diminished    Fluids    Intake/Output Summary (Last 24 hours) at 10/30/17 0719  Last data filed at 10/30/17 0400   Gross per 24 hour   Intake             1630 ml   Output             4175 ml   Net            -2545 ml       Nutrition  Orders Placed This Encounter   Procedures   • DIET ORDER      Standing Status:   Standing     Number of Occurrences:   1     Order Specific Question:   Diet:     Answer:   Low Fiber(GI Soft) [2]     Comments:   as tolerated     Physical Exam   Constitutional: She appears well-developed and well-nourished. No distress.   HENT:   Head: Normocephalic and atraumatic.   Mouth/Throat: No oropharyngeal exudate.   Eyes: Conjunctivae are normal. Pupils are equal, round, and reactive to light. No scleral icterus.   Neck: Normal range of motion. Neck supple.   Cardiovascular: Normal rate and regular rhythm.  Exam reveals no gallop and no friction rub.    No murmur heard.  Pulmonary/Chest: Effort normal and breath sounds normal. No respiratory distress. She has no wheezes. She has no rales. She exhibits no tenderness.   Abdominal: Soft. Bowel sounds are normal. She exhibits no distension. There is tenderness (diffuse on palpation ). There is no rebound and no guarding.   gastrostomy tube in place   Genitourinary:   Genitourinary Comments: +olmos catheter in place   Musculoskeletal: She exhibits no edema or tenderness.   Lymphadenopathy:     She has no cervical adenopathy.   Neurological: She is alert. No cranial nerve deficit.   Skin: Skin is warm and dry. No rash noted. No erythema. No pallor.   Psychiatric: She has a normal mood and affect.   Nursing note and vitals reviewed.                               Assessment/Plan     SBO (small bowel obstruction) due to malignant obstruction/carcinomatosis- (present on admission)   Assessment & Plan    - poor prognosis, nothing more that can be done, not even surgery.   - Continue PO diet for comfort. Continue pain control (PRN oral roxanol, and MS contin). Continue PRN antiemetics. Gastrostomy tube in place for palliation.          Anal carcinoma (CMS-HCC)- (present on admission)   Assessment & Plan    - await home hospice to be arranged.         Chronic pain- (present on admission)   Assessment & Plan    - continue comfort measure with  oral roxanol PRN, and MS contin.        Normocytic anemia- (present on admission)   Assessment & Plan    - Chronic, no sign of gross bleeding.        Rectal cancer (CMS-HCC)- (present on admission)   Assessment & Plan    - Patient is on experimental drug as an outpatient. Home hospice services being arranged. Dr. Sue agreed that hospice is appropriate level of care.        Clostridium difficile infection   Assessment & Plan    - continue PO vancomycin x 14 days.             Reviewed items::  Labs reviewed, Radiology images reviewed and Medications reviewed  Ivan catheter::  No Ivan  DVT prophylaxis pharmacological::  Enoxaparin (Lovenox)  Ulcer Prophylaxis::  Yes

## 2017-10-30 NOTE — PROGRESS NOTES
Received notification from colleague NENA Atkins who stated that patient's family has expressed to [facility] staff that patient is going on hospice, however family states hospice is $100/day and patient's family states that they cannot afford that cost. This ONN called CR Ramos on [facility] and relayed family's concerns. CR Ramos stated patient has not yet been accepted by hospice. CR Ramos confirmed the family and patient have been overwhelmed and CR Ramos stated she is working with family and patient and will follow-up with them regarding concerns.

## 2017-10-30 NOTE — CARE PLAN
Problem: Safety  Goal: Will remain free from injury  Outcome: PROGRESSING AS EXPECTED  Bed in lowest position, call light within reach, non slip  socks on patients feet, IV pole on same side of bed as bathroom, patient educated to call and wait for assistance before getting out of bed, patient refuses bed alarm, she calls appropriately.    Problem: Infection  Goal: Will remain free from infection  Outcome: PROGRESSING AS EXPECTED  Teach patient, family and friends hand hygiene when entering and exiting the room, wear protective equipment when entering the room.

## 2017-10-30 NOTE — PROGRESS NOTES
Bedside report received from day shift RN. Report called to 4th floor RN. Pt notified of transfer. Transport called at this time.

## 2017-10-30 NOTE — DISCHARGE PLANNING
Medical Social Work    Ongoing: This  spoke with [facility] who states they are working on insurance auth and will call this  back as soon as they have auth so that pt can go home.    Plan: SW awaiting call back from [facility] with insurance auth

## 2017-10-30 NOTE — CARE PLAN
Problem: Nutritional:  Goal: Achieve adequate nutritional intake  Patient will start diet and consume >50% of meals   Outcome: PROGRESSING SLOWER THAN EXPECTED  Spoke with patient at bedside regarding diet tolerance since her diet advanced from CL to low fiber/GI soft. Patient stated she is still only consuming ~25% of meals and experiences early satiety. Discussed snacks with patient and per patient request will add yogurt for snacks 1 and 3 & smoothies for snack 2 to help with appetite.

## 2017-10-30 NOTE — WOUND TEAM
"[facility]  Inpatient Services  Wound and Skin Care Progress Note    Admission Date:  10/12/17  HPI, PMH, SH: Reviewed  Unit where seen by Wound Team:  [id number]    WOUND CONSULT RELATED TO: follow up evaluation of coccyx wound and new wound right buttock    SUBJECTIVE:  \"I think it's a little better\"    Self Report / Pain Level: denies    OBJECTIVE:    No dressing in place, patient sitting up in chair.  Initial coccyx/sacral wound resolving with     WOUND TYPE, LOCATION, CHARACTERISTICS (Pressure ulcers: location, stage, POA or date identified)    Wound Type/Location:  Partial thickness wound due to moisture an friction right buttock  Periwound:  intact    Drainage:  none     Tissue Type and %:  Pink 100%    Wound Edges:  attached    Odor:  none     Exposed structure(s):  none   S&S of Infection:  none    Measurements:  (taken 10/30/17)    Length:  2.5cm   Width:   2.5cm   Depth:  <0.5cm   Tracts/undermining: none      INTERVENTIONS BY WOUND TEAM:  Patient stood from sitting in chair and cleansed buttock wound and coccyx with NS gauze.  Measurements taken of right buttock wound.  Covered both coccyx and upper right buttock wound with mepilex sacral dressing and assisted patient back to chair.  Discussed with staff RN.    Interdisciplinary consultation: staff RN, patient    EVALUATION:   Partial thickness wound due to moisture and friction which should resolve with protection from both.    Factors affecting wound healing: pressure, moisture  Goals: wound to decrease in size by 2% weekly    NURSING PLAN OF CARE ORDERS (X):    Dressing changes: See Dressing Maintenance orders:  X  Skin care: See Skin Care orders:   Rectal tube care: See Rectal Tube Care orders:    Other orders:    WOUND TEAM PLAN OF CARE (X):    NPWT change 3 x week:         Dressing changes by wound team:       Follow up as needed:   X     Other (explain):    Anticipated discharge plans (X):  SNF:           Home Care:  X       "   Outpatient Wound Center:            Self Care:            Other:

## 2017-10-31 LAB — EKG IMPRESSION: NORMAL

## 2017-10-31 PROCEDURE — A9270 NON-COVERED ITEM OR SERVICE: HCPCS | Performed by: INTERNAL MEDICINE

## 2017-10-31 PROCEDURE — 700101 HCHG RX REV CODE 250: Performed by: INTERNAL MEDICINE

## 2017-10-31 PROCEDURE — 770021 HCHG ROOM/CARE - ISO PRIVATE

## 2017-10-31 PROCEDURE — 700102 HCHG RX REV CODE 250 W/ 637 OVERRIDE(OP): Performed by: INTERNAL MEDICINE

## 2017-10-31 PROCEDURE — 93005 ELECTROCARDIOGRAM TRACING: CPT | Performed by: INTERNAL MEDICINE

## 2017-10-31 PROCEDURE — 700111 HCHG RX REV CODE 636 W/ 250 OVERRIDE (IP): Performed by: INTERNAL MEDICINE

## 2017-10-31 PROCEDURE — 93010 ELECTROCARDIOGRAM REPORT: CPT | Performed by: INTERNAL MEDICINE

## 2017-10-31 PROCEDURE — 700111 HCHG RX REV CODE 636 W/ 250 OVERRIDE (IP): Performed by: SURGERY

## 2017-10-31 PROCEDURE — 99232 SBSQ HOSP IP/OBS MODERATE 35: CPT | Performed by: INTERNAL MEDICINE

## 2017-10-31 PROCEDURE — 700101 HCHG RX REV CODE 250: Performed by: FAMILY MEDICINE

## 2017-10-31 RX ORDER — LABETALOL HYDROCHLORIDE 5 MG/ML
5 INJECTION, SOLUTION INTRAVENOUS ONCE
Status: COMPLETED | OUTPATIENT
Start: 2017-10-31 | End: 2017-10-31

## 2017-10-31 RX ADMIN — ONDANSETRON 4 MG: 4 TABLET, ORALLY DISINTEGRATING ORAL at 17:26

## 2017-10-31 RX ADMIN — MORPHINE SULFATE 4 MG: 4 INJECTION INTRAVENOUS at 09:53

## 2017-10-31 RX ADMIN — ONDANSETRON 4 MG: 4 TABLET, ORALLY DISINTEGRATING ORAL at 13:05

## 2017-10-31 RX ADMIN — MORPHINE SULFATE 10 MG: 100 SOLUTION ORAL at 13:05

## 2017-10-31 RX ADMIN — ONDANSETRON 4 MG: 4 TABLET, ORALLY DISINTEGRATING ORAL at 23:58

## 2017-10-31 RX ADMIN — MORPHINE SULFATE 10 MG: 100 SOLUTION ORAL at 00:44

## 2017-10-31 RX ADMIN — ONDANSETRON 4 MG: 2 INJECTION INTRAMUSCULAR; INTRAVENOUS at 09:52

## 2017-10-31 RX ADMIN — VANCOMYCIN HYDROCHLORIDE 125 MG: 10 INJECTION, POWDER, LYOPHILIZED, FOR SOLUTION INTRAVENOUS at 00:44

## 2017-10-31 RX ADMIN — VANCOMYCIN HYDROCHLORIDE 125 MG: 10 INJECTION, POWDER, LYOPHILIZED, FOR SOLUTION INTRAVENOUS at 17:25

## 2017-10-31 RX ADMIN — POTASSIUM CHLORIDE, DEXTROSE MONOHYDRATE AND SODIUM CHLORIDE: 150; 5; 900 INJECTION, SOLUTION INTRAVENOUS at 17:26

## 2017-10-31 RX ADMIN — ONDANSETRON 4 MG: 4 TABLET, ORALLY DISINTEGRATING ORAL at 06:09

## 2017-10-31 RX ADMIN — MORPHINE SULFATE 10 MG: 100 SOLUTION ORAL at 07:27

## 2017-10-31 RX ADMIN — FAMOTIDINE 20 MG: 10 INJECTION, SOLUTION INTRAVENOUS at 09:52

## 2017-10-31 RX ADMIN — MORPHINE SULFATE 10 MG: 100 SOLUTION ORAL at 16:32

## 2017-10-31 RX ADMIN — VANCOMYCIN HYDROCHLORIDE 125 MG: 10 INJECTION, POWDER, LYOPHILIZED, FOR SOLUTION INTRAVENOUS at 06:10

## 2017-10-31 RX ADMIN — FAMOTIDINE 20 MG: 10 INJECTION, SOLUTION INTRAVENOUS at 22:24

## 2017-10-31 RX ADMIN — MORPHINE SULFATE 10 MG: 100 SOLUTION ORAL at 22:24

## 2017-10-31 RX ADMIN — MORPHINE SULFATE 4 MG: 4 INJECTION INTRAVENOUS at 02:39

## 2017-10-31 RX ADMIN — VANCOMYCIN HYDROCHLORIDE 125 MG: 10 INJECTION, POWDER, LYOPHILIZED, FOR SOLUTION INTRAVENOUS at 13:05

## 2017-10-31 RX ADMIN — LABETALOL HYDROCHLORIDE 5 MG: 5 INJECTION, SOLUTION INTRAVENOUS at 06:10

## 2017-10-31 RX ADMIN — PROCHLORPERAZINE EDISYLATE 10 MG: 5 INJECTION INTRAMUSCULAR; INTRAVENOUS at 16:44

## 2017-10-31 RX ADMIN — VANCOMYCIN HYDROCHLORIDE 125 MG: 10 INJECTION, POWDER, LYOPHILIZED, FOR SOLUTION INTRAVENOUS at 23:59

## 2017-10-31 RX ADMIN — ONDANSETRON 4 MG: 4 TABLET, ORALLY DISINTEGRATING ORAL at 00:45

## 2017-10-31 RX ADMIN — ENOXAPARIN SODIUM 40 MG: 100 INJECTION SUBCUTANEOUS at 09:51

## 2017-10-31 ASSESSMENT — ENCOUNTER SYMPTOMS
CHILLS: 0
FOCAL WEAKNESS: 0
BACK PAIN: 0
SORE THROAT: 0
HEARTBURN: 0
ABDOMINAL PAIN: 1
DEPRESSION: 0
SHORTNESS OF BREATH: 0
HEADACHES: 0
HALLUCINATIONS: 0
BLOOD IN STOOL: 0
WEAKNESS: 1
VOMITING: 0
DIZZINESS: 0
DIARRHEA: 1
NAUSEA: 0
PALPITATIONS: 0
FEVER: 0
COUGH: 0
MYALGIAS: 0

## 2017-10-31 ASSESSMENT — PAIN SCALES - GENERAL
PAINLEVEL_OUTOF10: 5
PAINLEVEL_OUTOF10: 8
PAINLEVEL_OUTOF10: 6
PAINLEVEL_OUTOF10: 7
PAINLEVEL_OUTOF10: 7
PAINLEVEL_OUTOF10: 9
PAINLEVEL_OUTOF10: 8

## 2017-10-31 NOTE — PROGRESS NOTES
Bedside report received.  Assessment complete.  A&O x 4. Patient calls appropriately.  Denies numbness and tingling. Moves all extremities.   Patient up with one assist.  Patient has 8/10 pain. Medication provided  Denies N&V. Pt eats some of the gi soft diet.  Left upper quadrant G tube in place, draining to gravity, lap sites x 2 noted.  Ivan catheter in place to void, positive flatus  Patient denies SOB.  Review plan with of care with patient. Call light and personal belongings with in reach. Hourly rounding in place. All needs met at this time.

## 2017-10-31 NOTE — PROGRESS NOTES
[facility]ist Progress Note    Date of Service: 10/31/2017    Chief Complaint  62 y.o. Female with multiple cancers including breast, rectal with liver mets, and cervical,  admitted 10/12/2017 with abdominal pain. Found to have SBO. Initial conservative management failed, and thus surgery was consulted and had laparoscopy which showed carcinomatosis and malignant obstruction on the RLQ. No further surgical intervention done intra op, and gastrostomy tube was placed. Palliative care was consulted, and patient subsequently opted for hospice. Started on comfort measures. SW working on arranging home hospice.     Interval Problem Update  10/30/2017 - no overnight events. Remains hemodynamically stable and afebrile. Stable on 2L O2 NC.  Stool C diff PCR and toxin positive, negative NAP1-BI, started on PO vancomycin.    10/31/17: No events, diarrhea improved.  + abdominal Pain which is controlled.      Consultants/Specialty  Surgery  Oncology    Disposition  Monitor on the [facility]. Await home hospice to be arranged.       Review of Systems   Constitutional: Positive for malaise/fatigue. Negative for chills and fever.   HENT: Negative for sore throat.    Respiratory: Negative for cough and shortness of breath.    Cardiovascular: Negative for chest pain and palpitations.   Gastrointestinal: Positive for abdominal pain and diarrhea (Improved). Negative for blood in stool, heartburn, nausea and vomiting.   Genitourinary: Negative for dysuria and frequency.   Musculoskeletal: Negative for back pain and myalgias.   Neurological: Positive for weakness. Negative for dizziness, focal weakness and headaches.   Psychiatric/Behavioral: Negative for depression and hallucinations.   All other systems reviewed and are negative.       Pertinent positives/negatives as mentioned above.     A complete review of systems was done. All other systems were negative.      Physical Exam  Laboratory/Imaging   Hemodynamics  Temp (24hrs), Av.5  °C (97.7 °F), Min:36.3 °C (97.4 °F), Max:36.8 °C (98.3 °F)   Temperature: 36.3 °C (97.4 °F)  Pulse  Av.1  Min: 75  Max: 142    Blood Pressure: 109/60      Respiratory      Respiration: 17, Pulse Oximetry: 100 %        RUL Breath Sounds: Diminished, RML Breath Sounds: Diminished, RLL Breath Sounds: Diminished, MERE Breath Sounds: Diminished, LLL Breath Sounds: Diminished    Fluids    Intake/Output Summary (Last 24 hours) at 10/31/17 1334  Last data filed at 10/31/17 1110   Gross per 24 hour   Intake             1358 ml   Output             3075 ml   Net            -1717 ml       Nutrition  Orders Placed This Encounter   Procedures   • DIET ORDER     Standing Status:   Standing     Number of Occurrences:   1     Order Specific Question:   Diet:     Answer:   Low Fiber(GI Soft) [2]     Comments:   as tolerated     Physical Exam   Constitutional: She appears well-developed and well-nourished. No distress.   Listless   HENT:   Head: Normocephalic and atraumatic.   Mouth/Throat: No oropharyngeal exudate.   Eyes: Conjunctivae are normal. Pupils are equal, round, and reactive to light. No scleral icterus.   Neck: Normal range of motion. Neck supple.   Cardiovascular: Normal rate and regular rhythm.  Exam reveals no gallop and no friction rub.    No murmur heard.  Pulmonary/Chest: Effort normal and breath sounds normal. No respiratory distress. She has no wheezes. She has no rales. She exhibits no tenderness.   Abdominal: Soft. Bowel sounds are normal. She exhibits no distension. There is tenderness (diffuse on palpation ). There is no rebound and no guarding.   gastrostomy tube in place   Genitourinary:   Genitourinary Comments: +olmos catheter in place   Musculoskeletal: She exhibits no edema or tenderness.   Lymphadenopathy:     She has no cervical adenopathy.   Neurological: She is alert. No cranial nerve deficit.   Skin: Skin is warm and dry. No rash noted. No erythema. No pallor.   Psychiatric: She has a normal  mood and affect.   Nursing note and vitals reviewed.                               Assessment/Plan     SBO (small bowel obstruction) due to malignant obstruction/carcinomatosis- (present on admission)   Assessment & Plan    - poor prognosis, nothing more that can be done, not even surgery.   - Continue PO diet for comfort. Continue pain control (PRN oral roxanol, and MS contin). Continue PRN antiemetics. Gastrostomy tube in place for palliation.          Anal carcinoma (CMS-HCC)- (present on admission)   Assessment & Plan    - await home hospice to be arranged.         Chronic pain- (present on admission)   Assessment & Plan    - continue comfort measure with oral roxanol PRN, and MS contin.        Normocytic anemia- (present on admission)   Assessment & Plan    - Chronic, no sign of gross bleeding.        Rectal cancer (CMS-HCC)- (present on admission)   Assessment & Plan    - Patient is on experimental drug as an outpatient. Home hospice services being arranged. Dr. Sue agreed that hospice is appropriate level of care.        HTN (hypertension)- (present on admission)   Assessment & Plan    BP has actually been running on low end of normal  -Hold BP rx, resume of needed for comfort        Clostridium difficile infection   Assessment & Plan    - continue PO vancomycin x 14 days.             Reviewed items::  Labs reviewed, Radiology images reviewed and Medications reviewed  Ivan catheter::  No Ivan  DVT prophylaxis pharmacological::  Enoxaparin (Lovenox)  Ulcer Prophylaxis::  Yes

## 2017-10-31 NOTE — CARE PLAN
"Problem: Pain Management  Goal: Pain level will decrease to patient's comfort goal  Outcome: PROGRESSING AS EXPECTED  Pharmacologic and non pharmacologic measures in place to help pt mange pain. Pt states pharmacologic measures are helpful in helping her control her pain. Pt states despite measures taken to help relieve her pain she feels more \"discomfort, and can't get comfortable tonight\". Heat pack and repositioning in place to help manage pt's pain.     Problem: Skin Integrity  Goal: Risk for impaired skin integrity will decrease  Outcome: PROGRESSING AS EXPECTED  Pt turns self as needed, mepilex in place to help prevent further issues with moisture fissure on pt's sacrum and right buttock wound. Barrier cream in place. Pt verbalizes and demonstrates understanding of needing to turn in bed. Call light within reach, pt instructed to call if she cannot reposition herself.       "

## 2017-10-31 NOTE — PROGRESS NOTES
AA&O x 4  Ambulates/transfers with 1 assist  OOB to chair for lunch.  LUQ G tube draining to gravity - leakage around site - Dr. Doll notified - no further surgical intervention - continue flushing and attempt to pull back tube to engage balloon.    -126 EKG ordered.  Refusing all medication in pill form  Medicated for pain Q 3 hr per MAR.  Ivan intact to gravity with small dark yellow cloudy/sediment.

## 2017-11-01 PROBLEM — R00.0 TACHYCARDIA: Status: ACTIVE | Noted: 2017-11-01

## 2017-11-01 LAB
ALBUMIN SERPL BCP-MCNC: 1.8 G/DL (ref 3.2–4.9)
ANISOCYTOSIS BLD QL SMEAR: ABNORMAL
BASOPHILS # BLD AUTO: 0 % (ref 0–1.8)
BASOPHILS # BLD: 0 K/UL (ref 0–0.12)
BUN SERPL-MCNC: 8 MG/DL (ref 8–22)
CALCIUM SERPL-MCNC: 7.5 MG/DL (ref 8.5–10.5)
CHLORIDE SERPL-SCNC: 101 MMOL/L (ref 96–112)
CO2 SERPL-SCNC: 29 MMOL/L (ref 20–33)
CREAT SERPL-MCNC: 0.69 MG/DL (ref 0.5–1.4)
EOSINOPHIL # BLD AUTO: 0 K/UL (ref 0–0.51)
EOSINOPHIL NFR BLD: 0 % (ref 0–6.9)
ERYTHROCYTE [DISTWIDTH] IN BLOOD BY AUTOMATED COUNT: 54.2 FL (ref 35.9–50)
GFR SERPL CREATININE-BSD FRML MDRD: >60 ML/MIN/1.73 M 2
GLUCOSE SERPL-MCNC: 127 MG/DL (ref 65–99)
HCT VFR BLD AUTO: 33.7 % (ref 37–47)
HGB BLD-MCNC: 10.7 G/DL (ref 12–16)
HYPOCHROMIA BLD QL SMEAR: ABNORMAL
LYMPHOCYTES # BLD AUTO: 0.64 K/UL (ref 1–4.8)
LYMPHOCYTES NFR BLD: 5.4 % (ref 22–41)
MACROCYTES BLD QL SMEAR: ABNORMAL
MAGNESIUM SERPL-MCNC: 1.7 MG/DL (ref 1.5–2.5)
MANUAL DIFF BLD: NORMAL
MCH RBC QN AUTO: 28.6 PG (ref 27–33)
MCHC RBC AUTO-ENTMCNC: 31.5 G/DL (ref 33.6–35)
MCV RBC AUTO: 90.7 FL (ref 81.4–97.8)
MONOCYTES # BLD AUTO: 0.73 K/UL (ref 0–0.85)
MONOCYTES NFR BLD AUTO: 6.2 % (ref 0–13.4)
MORPHOLOGY BLD-IMP: NORMAL
NEUTROPHILS # BLD AUTO: 10.43 K/UL (ref 2–7.15)
NEUTROPHILS NFR BLD: 88.4 % (ref 44–72)
NRBC # BLD AUTO: 0 K/UL
NRBC BLD AUTO-RTO: 0 /100 WBC
PHOSPHATE SERPL-MCNC: 2.1 MG/DL (ref 2.5–4.5)
PLATELET # BLD AUTO: 288 K/UL (ref 164–446)
PMV BLD AUTO: 10.4 FL (ref 9–12.9)
POTASSIUM SERPL-SCNC: 3.8 MMOL/L (ref 3.6–5.5)
RBC # BLD AUTO: 3.78 M/UL (ref 4.2–5.4)
RBC BLD AUTO: PRESENT
SODIUM SERPL-SCNC: 136 MMOL/L (ref 135–145)
WBC # BLD AUTO: 11.8 K/UL (ref 4.8–10.8)

## 2017-11-01 PROCEDURE — 700102 HCHG RX REV CODE 250 W/ 637 OVERRIDE(OP): Performed by: INTERNAL MEDICINE

## 2017-11-01 PROCEDURE — 700111 HCHG RX REV CODE 636 W/ 250 OVERRIDE (IP): Performed by: INTERNAL MEDICINE

## 2017-11-01 PROCEDURE — 99232 SBSQ HOSP IP/OBS MODERATE 35: CPT | Performed by: INTERNAL MEDICINE

## 2017-11-01 PROCEDURE — 700111 HCHG RX REV CODE 636 W/ 250 OVERRIDE (IP): Performed by: SURGERY

## 2017-11-01 PROCEDURE — A9270 NON-COVERED ITEM OR SERVICE: HCPCS | Performed by: INTERNAL MEDICINE

## 2017-11-01 PROCEDURE — 85027 COMPLETE CBC AUTOMATED: CPT

## 2017-11-01 PROCEDURE — 83735 ASSAY OF MAGNESIUM: CPT

## 2017-11-01 PROCEDURE — 80069 RENAL FUNCTION PANEL: CPT

## 2017-11-01 PROCEDURE — 85007 BL SMEAR W/DIFF WBC COUNT: CPT

## 2017-11-01 PROCEDURE — 700101 HCHG RX REV CODE 250: Performed by: INTERNAL MEDICINE

## 2017-11-01 PROCEDURE — 770021 HCHG ROOM/CARE - ISO PRIVATE

## 2017-11-01 RX ORDER — MORPHINE SULFATE 100 MG/5ML
20 SOLUTION ORAL
Status: DISCONTINUED | OUTPATIENT
Start: 2017-11-01 | End: 2017-11-04 | Stop reason: HOSPADM

## 2017-11-01 RX ADMIN — VANCOMYCIN HYDROCHLORIDE 125 MG: 10 INJECTION, POWDER, LYOPHILIZED, FOR SOLUTION INTRAVENOUS at 17:47

## 2017-11-01 RX ADMIN — ONDANSETRON 4 MG: 4 TABLET, ORALLY DISINTEGRATING ORAL at 11:50

## 2017-11-01 RX ADMIN — MORPHINE SULFATE 10 MG: 100 SOLUTION ORAL at 05:22

## 2017-11-01 RX ADMIN — FAMOTIDINE 20 MG: 10 INJECTION, SOLUTION INTRAVENOUS at 21:37

## 2017-11-01 RX ADMIN — ENOXAPARIN SODIUM 40 MG: 100 INJECTION SUBCUTANEOUS at 08:13

## 2017-11-01 RX ADMIN — PROCHLORPERAZINE EDISYLATE 10 MG: 5 INJECTION INTRAMUSCULAR; INTRAVENOUS at 08:20

## 2017-11-01 RX ADMIN — MORPHINE SULFATE 20 MG: 100 SOLUTION ORAL at 21:35

## 2017-11-01 RX ADMIN — ONDANSETRON 4 MG: 4 TABLET, ORALLY DISINTEGRATING ORAL at 05:21

## 2017-11-01 RX ADMIN — MORPHINE SULFATE 20 MG: 100 SOLUTION ORAL at 17:47

## 2017-11-01 RX ADMIN — MORPHINE SULFATE 10 MG: 100 SOLUTION ORAL at 14:07

## 2017-11-01 RX ADMIN — VANCOMYCIN HYDROCHLORIDE 125 MG: 10 INJECTION, POWDER, LYOPHILIZED, FOR SOLUTION INTRAVENOUS at 05:23

## 2017-11-01 RX ADMIN — FAMOTIDINE 20 MG: 10 INJECTION, SOLUTION INTRAVENOUS at 08:13

## 2017-11-01 RX ADMIN — POTASSIUM CHLORIDE, DEXTROSE MONOHYDRATE AND SODIUM CHLORIDE: 150; 5; 900 INJECTION, SOLUTION INTRAVENOUS at 10:21

## 2017-11-01 RX ADMIN — VANCOMYCIN HYDROCHLORIDE 125 MG: 10 INJECTION, POWDER, LYOPHILIZED, FOR SOLUTION INTRAVENOUS at 11:50

## 2017-11-01 RX ADMIN — POTASSIUM CHLORIDE, DEXTROSE MONOHYDRATE AND SODIUM CHLORIDE: 150; 5; 900 INJECTION, SOLUTION INTRAVENOUS at 21:35

## 2017-11-01 RX ADMIN — MORPHINE SULFATE 10 MG: 100 SOLUTION ORAL at 11:15

## 2017-11-01 RX ADMIN — ONDANSETRON 4 MG: 4 TABLET, ORALLY DISINTEGRATING ORAL at 17:47

## 2017-11-01 RX ADMIN — MORPHINE SULFATE 4 MG: 4 INJECTION INTRAVENOUS at 08:24

## 2017-11-01 RX ADMIN — MORPHINE SULFATE 10 MG: 100 SOLUTION ORAL at 01:32

## 2017-11-01 ASSESSMENT — PAIN SCALES - GENERAL
PAINLEVEL_OUTOF10: 6
PAINLEVEL_OUTOF10: 8
PAINLEVEL_OUTOF10: 8
PAINLEVEL_OUTOF10: 9
PAINLEVEL_OUTOF10: 7
PAINLEVEL_OUTOF10: 8
PAINLEVEL_OUTOF10: 8
PAINLEVEL_OUTOF10: 10
PAINLEVEL_OUTOF10: 6
PAINLEVEL_OUTOF10: 5

## 2017-11-01 ASSESSMENT — ENCOUNTER SYMPTOMS
BACK PAIN: 0
COUGH: 0
BLOOD IN STOOL: 0
SORE THROAT: 0
DIZZINESS: 0
HEADACHES: 0
WEAKNESS: 1
MYALGIAS: 0
HEARTBURN: 0
HALLUCINATIONS: 0
DEPRESSION: 0
VOMITING: 0
NAUSEA: 0
DIARRHEA: 1
ABDOMINAL PAIN: 1
PALPITATIONS: 0
FOCAL WEAKNESS: 0
SHORTNESS OF BREATH: 0
CHILLS: 0
FEVER: 0

## 2017-11-01 NOTE — PALLIATIVE CARE
"PALLIATIVE CARE FOLLOW-UP:    Discussed POC with pt LOLA Mckinney.    Met with pt to re-address code status.  Pt stated, \"Nothing has changed, still try to bring me back.\"  Pt stated anxious to get home with hospice.    Paged and updated:  Dr. Eastman    Thank you for allowing Palliative Care to support this pt.  Contact [id number] for additional assistance, questions or concerns.  "

## 2017-11-01 NOTE — PROGRESS NOTES
[facility]ist Progress Note    Date of Service: 11/1/2017    Chief Complaint  62 y.o. Female with multiple cancers including breast, rectal with liver mets, and cervical,  admitted 10/12/2017 with abdominal pain. Found to have SBO. Initial conservative management failed, and thus surgery was consulted and had laparoscopy which showed carcinomatosis and malignant obstruction on the RLQ. No further surgical intervention done intra op, and gastrostomy tube was placed. Palliative care was consulted, and patient subsequently opted for hospice. Started on comfort measures. SW working on arranging home hospice.     Interval Problem Update  10/30/2017 - no overnight events. Remains hemodynamically stable and afebrile. Stable on 2L O2 NC.  Stool C diff PCR and toxin positive, negative NAP1-BI, started on PO vancomycin.    10/31/17: No events, diarrhea improved.  + abdominal Pain which is controlled.  11/1/17: She developed tachycardia last night, EKG with right bundle and first-degree A-V block however sinus tachycardia. No evidence of acute ischemia or A. fib. Patient is comfortable, refusing oral pain medications therefore will discontinue.    Consultants/Specialty  Surgery  Oncology  Palliative care team    Disposition  Monitor on the [facility]. Await home hospice to be arranged.       Review of Systems   Constitutional: Positive for malaise/fatigue. Negative for chills and fever.   HENT: Negative for sore throat.    Respiratory: Negative for cough and shortness of breath.    Cardiovascular: Negative for chest pain and palpitations.   Gastrointestinal: Positive for abdominal pain and diarrhea (Improved). Negative for blood in stool, heartburn, nausea and vomiting.   Genitourinary: Negative for dysuria and frequency.   Musculoskeletal: Negative for back pain and myalgias.   Neurological: Positive for weakness. Negative for dizziness, focal weakness and headaches.   Psychiatric/Behavioral: Negative for depression and  hallucinations.   All other systems reviewed and are negative.       Pertinent positives/negatives as mentioned above.     A complete review of systems was done. All other systems were negative.      Physical Exam  Laboratory/Imaging   Hemodynamics  Temp (24hrs), Av.2 °C (97.2 °F), Min:36.1 °C (96.9 °F), Max:36.4 °C (97.6 °F)   Temperature: 36.2 °C (97.2 °F)  Pulse  Av  Min: 75  Max: 142    Blood Pressure: (!) 87/58 (RN notified)      Respiratory      Respiration: 16, Pulse Oximetry: 94 %        RUL Breath Sounds: Diminished, RML Breath Sounds: Diminished, RLL Breath Sounds: Diminished, MERE Breath Sounds: Diminished, LLL Breath Sounds: Diminished    Fluids    Intake/Output Summary (Last 24 hours) at 17 1454  Last data filed at 17 1300   Gross per 24 hour   Intake             1980 ml   Output             2750 ml   Net             -770 ml       Nutrition  Orders Placed This Encounter   Procedures   • DIET ORDER     Standing Status:   Standing     Number of Occurrences:   1     Order Specific Question:   Diet:     Answer:   Low Fiber(GI Soft) [2]     Comments:   as tolerated     Physical Exam   Constitutional: She appears well-developed and well-nourished. No distress.   Listless   HENT:   Head: Normocephalic and atraumatic.   Mouth/Throat: No oropharyngeal exudate.   Eyes: Conjunctivae are normal. Pupils are equal, round, and reactive to light. No scleral icterus.   Neck: Normal range of motion. Neck supple.   Cardiovascular: Normal rate and regular rhythm.  Exam reveals no gallop and no friction rub.    No murmur heard.  Pulmonary/Chest: Effort normal and breath sounds normal. No respiratory distress. She has no wheezes. She has no rales. She exhibits no tenderness.   Abdominal: Soft. Bowel sounds are normal. She exhibits no distension. There is tenderness (diffuse on palpation ). There is no rebound and no guarding.   gastrostomy tube in place   Genitourinary:   Genitourinary Comments: +olmos  catheter in place   Musculoskeletal: She exhibits no edema or tenderness.   Lymphadenopathy:     She has no cervical adenopathy.   Neurological: She is alert. No cranial nerve deficit.   Skin: Skin is warm and dry. No rash noted. No erythema. No pallor.   Psychiatric: She has a normal mood and affect.   Nursing note and vitals reviewed.                               Assessment/Plan     SBO (small bowel obstruction) due to malignant obstruction/carcinomatosis- (present on admission)   Assessment & Plan    - poor prognosis, nothing more that can be done, not even surgery.   - Continue PO diet for comfort. Continue pain control (PRN oral roxanol, and MS contin). Continue PRN antiemetics. Gastrostomy tube in place for palliation.          Anal carcinoma (CMS-HCC)- (present on admission)   Assessment & Plan    - await home hospice to be arranged.         Tachycardia   Assessment & Plan    Developed yesterday afternoon. The patient is asymptomatic. She says that her pain in her abdomen is well controlled.  EKG showed right bundle branch block however this was sinus rhythm.   Will check labs to ensure she is not volume depleted  Order telemetry  She is DNR and pending discharge on hospice therefore aggressive workup or therapy not indicated but will do limited eval for reversible etiology.        Chronic pain- (present on admission)   Assessment & Plan    - continue comfort measure with oral roxanol PRN, and MS contin.        Normocytic anemia- (present on admission)   Assessment & Plan    - Chronic, no sign of gross bleeding.        Rectal cancer (CMS-HCC)- (present on admission)   Assessment & Plan    - Patient is on experimental drug as an outpatient. Home hospice services being arranged. Dr. Sue agreed that hospice is appropriate level of care.        HTN (hypertension)- (present on admission)   Assessment & Plan    BP has actually been running on low end of normal  -Hold BP rx, resume of needed for comfort         Clostridium difficile infection   Assessment & Plan    - continue PO vancomycin x 14 days.             Reviewed items::  Labs reviewed, Radiology images reviewed and Medications reviewed  Ivan catheter::  No Ivan  DVT prophylaxis pharmacological::  Enoxaparin (Lovenox)  Ulcer Prophylaxis::  Yes

## 2017-11-01 NOTE — DISCHARGE INSTRUCTIONS
Discharge Instructions    Discharged to home by car with escort. Discharged via wheelchair, hospital escort: Yes.  Special equipment needed: Oxygen    Be sure to schedule a follow-up appointment with your primary care doctor or any specialists as instructed.     Discharge Plan:   Diet Plan: Discussed  Activity Level: Discussed  Confirmed Follow up Appointment: Patient to Call and Schedule Appointment  Confirmed Symptoms Management: Discussed  Medication Reconciliation Updated: Yes  Pneumococcal Vaccine Given - only chart on this line when given:  (not indicated receved vax 5/2017)  Influenza Vaccine Indication: Indicated: 9 to 64 years of age  Influenza Vaccine Given - only chart on this line when given: Influenza Vaccine Given (See MAR)    I understand that a diet low in cholesterol, fat, and sodium is recommended for good health. Unless I have been given specific instructions below for another diet, I accept this instruction as my diet prescription.   Other diet: Full liquids, as tolerated    Special Instructions: None    · Is patient discharged on Warfarin / Coumadin?   No     · Is patient Post Blood Transfusion?  No    Depression / Suicide Risk    As you are discharged from this [facility] facility, it is important to learn how to keep safe from harming yourself.    Recognize the warning signs:  · Abrupt changes in personality, positive or negative- including increase in energy   · Giving away possessions  · Change in eating patterns- significant weight changes-  positive or negative  · Change in sleeping patterns- unable to sleep or sleeping all the time   · Unwillingness or inability to communicate  · Depression  · Unusual sadness, discouragement and loneliness  · Talk of wanting to die  · Neglect of personal appearance   · Rebelliousness- reckless behavior  · Withdrawal from people/activities they love  · Confusion- inability to concentrate     If you or a loved one observes any of these behaviors or has  concerns about self-harm, here's what you can do:  · Talk about it- your feelings and reasons for harming yourself  · Remove any means that you might use to hurt yourself (examples: pills, rope, extension cords, firearm)  · Get professional help from the community (Mental Health, Substance Abuse, psychological counseling)  · Do not be alone:Call your Safe Contact- someone whom you trust who will be there for you.  · Call your local CRISIS HOTLINE 083-0248 or 022-242-4534  · Call your local Children's Mobile Crisis Response Team [place] (719) 800-8628 or www.thinkingphones  · Call the toll free National Suicide Prevention Hotlines   · National Suicide Prevention Lifeline 881-552-TJLS (9060)  · [employer] Line Network 800-SUICIDE (357-8413)      Small Bowel Obstruction  A small bowel obstruction means something is blocking the small intestine. The small intestine is the long tube that connects the stomach to the colon. Treatment depends on what is causing the problem. Treatment also depends on how bad the problem is.  HOME CARE  Your doctor may let you go home if your small bowel is not completely blocked.  · Rest.  · Follow your diet as told by your doctor.  · Only drink clear liquids until you start to get better.  · Avoid solid foods as told by your doctor.  · Only take medicine as told by your doctor.  GET HELP RIGHT AWAY IF:  · You have pain or cramps that get worse.  · You throw up (vomit) blood.  · You feel sick to your stomach (nauseous), or you cannot stop throwing up.  · You cannot drink fluids.  · You feel confused.  · You feel dry or thirsty (dehydrated).  · Your belly gets more puffy (bloated).  · You have chills.  · You have a fever.  · You feel weak, or you pass out (faint).  MAKE SURE YOU:  · Understand these instructions.  · Will watch your condition.  · Will get help right away if you are not doing well or get worse.     This information is not intended to replace advice given to you by  your health care provider. Make sure you discuss any questions you have with your health care provider.     Document Released: 01/25/2006 Document Revised: 01/08/2016 Document Reviewed: 02/11/2016  [employer] Interactive Patient Education ©2016 [employer]  Hospice  Hospice is a service that is designed to provide people who are terminally ill and their families with medical, spiritual, and psychological support. Its aim is to improve your quality of life by keeping you as alert and comfortable as possible. Hospice is performed by a team of health care professionals and volunteers who:  · Help keep you comfortable. Hospice can be provided in your home or in a homelike setting. The hospice staff works with your family and friends to help meet your needs. You will enjoy the support of loved ones by receiving much of your basic care from family and friends.  · Provide pain relief and manage your symptoms. The staff supply all necessary medicines and equipment.  · Provide companionship when you are alone.  · Allow you and your family to rest. They may do light housekeeping, prepare meals, and run errands.  · Provide counseling. They will make sure your emotional, spiritual, and social needs and those of your family are being met.  · Provide spiritual care. Spiritual care is individualized to meet your needs and your family's needs. It may involve helping you look at what death means to you, say goodbye, or perform a specific Sikh ceremony or ritual.  Hospice teams often include:  · A nurse.  · A doctor.  · Social workers.  · Episcopal leaders (such as a ).  · Trained volunteers.  WHEN SHOULD HOSPICE CARE BEGIN?  Most people who use hospice are believed to have fewer than 6 months to live. Your family and health care providers can help you decide when hospice services should begin. If your condition improves, you may discontinue the program.  WHAT SHOULD I CONSIDER BEFORE SELECTING A PROGRAM?  Most hospice  programs are run by nonprofit, independent organizations. Some are affiliated with hospitals, nursing homes, or home health care agencies. Hospice programs can take place in the home or at a hospice center, hospital, or skilled nursing facility. When choosing a hospice program, ask the following questions:  · What services are available to me?  · What services are offered to my loved ones?  · How involved are my loved ones?  · How involved is my health care provider?  · Who makes up the hospice care team? How are they trained or screened?  · How will my pain and symptoms be managed?  · If my circumstances change, can the services be provided in a different setting, such as my home or in the hospital?  · Is the program reviewed and licensed by the state or certified in some other way?  WHERE CAN I LEARN MORE ABOUT HOSPICE?  You can learn about existing hospice programs in your area from your health care providers. You can also read more about hospice online. The websites of the following organizations contain helpful information:  · The National Hospice and Palliative Care Organization (NHPCO).  · The Hospice Association of [place] (HAA).  · The Hospice Education [place].  · The American Cancer Society (ACS).  · Hospice Net.     This information is not intended to replace advice given to you by your health care provider. Make sure you discuss any questions you have with your health care provider.     Palliative Care  Palliative care is the care of your body, mind, and spirit. Palliative care services are offered to people dealing with serious and life-threatening illnesses, often in the hospital or a long-term care setting. Palliative care requires a team of people who will help to ensure:  · Pain and symptoms are controlled.  · Family support.  · Spiritual support.  · Emotional and social support.  · Comfort.  Palliative care is a way to bring comfort and peace of mind to a person and his or her family. It can  also have a positive impact on the course of illness.   WHO CAN RECEIVE PALLIATIVE CARE SERVICES?  Palliative care is offered to children and adults when they are seriously ill and may not be responding well to treatment options. The person may be undergoing active treatment, such as chemotherapy, or may have stopped active treatment. Some people may have just been diagnosed with advanced disease or life-limiting illness. A health care provider will usually recommend palliative care services when more support would be helpful.  WHAT TYPES OF SERVICES ARE INCLUDED IN PALLIATIVE CARE?  Palliative care includes a team of health care providers and supporters who come together to help a person facing serious and life-threatening illness. The person's existing doctors are included in the care team, and supporters are added. Family members and friends may also receive palliative care services to cope with stress and other concerns. Services are different for each person and are based on the person's needs and preferences.  The following people make up a palliative care team:  · The person receiving care and his or her family.  · Physicians, including primary health care providers and specialists.  · Nurses.  · A psychosocial worker.  Other people on the palliative care team may include:   · A pain specialist and sometimes a hospice specialist.  · A financial or .  · Community resources, such as school and spiritual organizations.  · Druze leaders.  · A care coordinator or .  · A bereavement coordinator.  The team will talk with the person and his or her family about:  · The role of the pain specialist and the hospice specialist if this applies.  · The person's active physical symptoms, such as pain, nausea, vomiting, and shortness of breath.  · Stress, depression, and anxiety symptoms.  · Function and mobility issues and how to stay as active as possible.  · Treatment options and how  they may affect life.  · Spiritual wishes, such as rituals and prayer.  · Legacy and memory making activities.  · Life and death as a normal process.  · Advance directives or living graff, health care proxies, and end-of-life care.  · Any other concerns or issues.  The palliative care team will make it okay to talk about difficult issues and topics. Preferences and sensitive spiritual and emotional concerns are of high importance.  PALLIATIVE CARE AND HOSPICE ARE SOMEWHAT DIFFERENT  Palliative care and hospice care have similar goals of managing symptoms, promoting comfort, and maintaining dignity. However, hospice care is an option for people during the last 6 months of life expectancy. The palliative care team will coordinate the many support services provided during any phase of serious illness.      This information is not intended to replace advice given to you by your health care provider. Make sure you discuss any questions you have with your health care provider.     Cancer, General Information  Cancer is a group of many related diseases that begin in cells. Cells are the building blocks of the body. Cells are also the basic unit of life. The body is made up of many types of cells. Normally, cells grow and divide to produce more cells only when the body needs them. This orderly process helps keep the body healthy. Sometimes cells keep dividing when new cells are not needed. These extra cells may form a mass of tissue. It is called a growth or tumor. Tumors can be either:  · Not cancerous (benign).   · Cancerous (malignant).   Cancer can begin in any organ or tissue of the body. The original tumor is where the tumor started out. It is called the primary cancer or primary tumor. Usually it is named for the part of the body in which it begins.  Metastases mean the spread of cancer. Cancer cells can break away from a primary tumor and travel locally. These grow next to the tumor. Cancer cells can also travel  through the bloodstream or lymphatic system to other parts of the body. Cancer cells may spread to lymph nodes near the primary tumor regional lymph nodes, so called because they are in the region. This is called:  · Jc involvement.   · Positive nodes.   · Regional disease.   Cancer cells can also spread to other parts of the body, distant from the primary tumor. Doctors use the term metastatic disease or distant disease to describe cancer that spreads to other organs or to lymph nodes other than those near the primary tumor.  When cancer cells spread and form a new tumor, the new tumor is called a secondary, or metastatic, tumor. The cancer cells that form the secondary tumor are like those in the original tumor. That means, for example, that if breast cancer spreads (metastasizes) to the lung, the secondary tumor is made up of abnormal breast cells. It is not made of abnormal lung cells. The disease in the lung is metastatic breast cancer. It is not lung cancer.  A metastasis is a tumor that started from a cancer cell or cells in another part of the body. But sometimes a primary cancer is discovered only after a metastasis causes problems (symptoms). For example, a man whose prostate cancer has spread to the bones in the pelvis may have lower back pain (caused by the cancer in his bones) before experiencing any symptoms from the prostate tumor itself.  The cells in a metastatic tumor resemble those in the primary tumor. The cancerous tissue is examined under a microscope to determine the cell type. Then a doctor can usually tell whether that type of cell is normally found in the part of the body from which the tissue sample was taken.  For instance, breast cancer cells look the same whether they are found in the breast or have spread to another part of the body. So, if a tissue sample taken from a tumor in the lung contains cells that look like breast cells, the doctor determines that the lung tumor is a  secondary tumor. Metastatic cancers may be found at the same time as the primary tumor, or months or years later. When a second tumor is found in a patient who has been treated for cancer in the past, it is more often a metastasis than another primary tumor. In a small number of cancer patients, a secondary tumor is diagnosed, but no primary cancer can be found, in spite of extensive tests. Doctors refer to the primary tumor as unknown or occult. The patient is said to have cancer of unknown primary origin (CUP). This means we do not know where the tumor came from.  TREATMENT   When cancer has metastasized, it may be treated with:  · Chemotherapy.   · Radiation therapy.   · Biologic therapy.   · Hormone therapy.   · Surgery.   · A combination of these.   The choice of treatment generally depends on the:  · Type of primary cancer.   · Size and location of the metastasis.   · Patient's age and general health.   · Types of treatments used previously.   In patients diagnosed with CUP, it is still possible to treat the disease even when the primary tumor cannot be located. It is also possible to treat the cancer even if the organ from which the cancer cells came cannot be identified.  New cancer treatments are currently under study. To develop new treatments, the National Cancer [place] (NCI) sponsors clinical trials (research studies) with cancer patients in many hospitals, universities, medical schools, and cancer centers around the country. Clinical trials are a critical step in the improvement of treatment. Before any new treatment can be recommended for general use, doctors conduct studies to find out whether the treatment is both safe for patients and effective against the disease. The results of such studies have led to progress not only in the treatment of cancer, but in the detection, diagnosis, improvement of survival rates, and prevention of the disease. Patients interested in participating in research  should ask their doctor to find out whether they are eligible for a clinical trial.  FOR MORE INFORMATION  http://www.cancer.gov  Document Released: 12/18/2006 Document Revised: 06/18/2013 Document Reviewed: 09/04/2008  [employer]® Patient Information ©2013 [employer], [employer].

## 2017-11-01 NOTE — PROGRESS NOTES
Bed alarm refused. Despite education pt continues to refuse BA.  SCD's refused. Despite education pt continues to refuse.

## 2017-11-01 NOTE — PROGRESS NOTES
Pt reporting uncontrolled pain, refusing any PO medications.  Pts family in to see pt and upset per family they have unresolved questions from yesterday. This RN concerned regarding code status all palliative notes indicate that pt wanted to remain a full code and that [facility] had accepted pt with this code status.  Discussed with hospitalist, code status was changed at 1939 by [facility] hospitalist no notes explain pts change of mind and pt and family don't seem to fully grasp that she is a DNR. Dr. Eastman ordered labs and PRN pain medications changed. Palliative care RN notified that family has questions. This RN in to speak with family and get labs from [facility]. [facility] is not drawing,  notified and will be up here shortly to get labs. Updated pt and family on pain medications and discussed heart rate and BP and reason for labs.  Discussed dietary concerns and lunch was special ordered for pt. Pt family would like pt to have a pain patch if possible. Explained that we just doubled the dose of the Roxanol and we need to see how that controls her pain first and that we want to get her off the IV pain medications as she cannot have them at home. Palliative care RN, Bhavya called this RN back to get more information, per Bhavya she is also unaware why and when code status was changed and while her partner casi is following the patient she can come up and discuss with family if they are upset. Explained that pts family is much calmer and comfortable and is leaving for the day, Bhavya will have Casi come speak to pt regarding code status later today.

## 2017-11-01 NOTE — PROGRESS NOTES
Discussed with [facility] hospice acceptance.  Per SW we are still waiting on acceptance and it could take several days as they work with insurance to get approval. SW did update pt and family yesterday afternoon.  Discussed [facility] with Case Management as pt was originally accepted to their service and then was cancelled due to their RN having an emergency surgery, per the notes. Our Case Management team will look back into this option as well. Pt updated.

## 2017-11-01 NOTE — CARE PLAN
Problem: Safety  Goal: Will remain free from falls  Outcome: PROGRESSING AS EXPECTED  Pt calls appropriately. Call light within reach. Treaded socks on.     Problem: Pain Management  Goal: Pain level will decrease to patient's comfort goal  Outcome: PROGRESSING AS EXPECTED  Pt medicated per MAR. Relaxation techniques discussed with pt. Will continue to monitor and medicate per MAR for pain.

## 2017-11-01 NOTE — PROGRESS NOTES
Bedside report received.  Assessment complete.  A&O x 4. Patient calls appropriately.  Denies numbness and tingling. Moves all extremities.   Patient up with one assist, and FWW.  Patient has 7/10 pain. Medication provided  Denies N&V. Tolerating regular GI soft diet.  LUQ G tube in place, lap sites x 2 noted.   Ivan in place, pt denies flatus  Patient denies SOB.  Review plan with of care with patient. Call light and personal belongings with in reach. Hourly rounding in place. All needs met at this time.

## 2017-11-01 NOTE — PROGRESS NOTES
Report received.  Assumed Care at 0715. Pt is AOx4, responds appropriately. Pt has constant pain at an 8/10, pt continues to be medicated per MAR.  Plan of care discussed with pt after conversation with  about discharge. G-tube draining to olmos bag, dressing was changed.  Explained importance of calling before getting OOB and pt verbalizes understanding.  Call light and belongings within reach, treaded slipper socks on, bed in lowest position.  Will monitor frequently.

## 2017-11-01 NOTE — CARE PLAN
Problem: Pain Management  Goal: Pain level will decrease to patient's comfort goal  Outcome: PROGRESSING AS EXPECTED  Pharmacologic and non pharmacologic measures in place to help pt mange pain. Pt states pharmacologic measures are helpful in helping her control her pain. Pt states that since a new nausea medication has been added her discomfort has eased since last night.     Problem: Skin Integrity  Goal: Risk for impaired skin integrity will decrease  Outcome: PROGRESSING AS EXPECTED  Pt turns self as needed, mepilex in place to help prevent further issues with moisture fissure on pt's sacrum and right buttock wound. Barrier cream in place. Pt verbalizes and demonstrates understanding of needing to turn in bed. Call light within reach, pt instructed to call if she cannot reposition herself.

## 2017-11-01 NOTE — DISCHARGE PLANNING
Medical Social Work     SW received a call from [facility] ([phone] or [phone]) advising SW that she spoke with the pt insurance [employer] and they stated that the insurance covers a 100% of all services for:    [facility]  [address] · [phone]  Fax # [phone]    The pt and family are requesting SW send a referral to this company while we are waiting to hear back from [facility].

## 2017-11-01 NOTE — DISCHARGE PLANNING
Medical Social Work     RN advised SW that the family was had questions regarding the pt. SW spoke with pt and family at bedside and update the pt and family on the process that [facility] was going through with the insurance in order to try to get approval for Hospice. CR advised the pt and family that SW spoke with Alec with [facility] today, 10/31/17 around 1200 and she stated that it may be two or three days before they can get an approval or denial.     Plan: SW need to contact these family members with updates: Lola [phone] or [phone] (cell) and April  [phone].

## 2017-11-02 PROCEDURE — 700111 HCHG RX REV CODE 636 W/ 250 OVERRIDE (IP): Performed by: SURGERY

## 2017-11-02 PROCEDURE — A9270 NON-COVERED ITEM OR SERVICE: HCPCS | Performed by: INTERNAL MEDICINE

## 2017-11-02 PROCEDURE — 700102 HCHG RX REV CODE 250 W/ 637 OVERRIDE(OP): Performed by: INTERNAL MEDICINE

## 2017-11-02 PROCEDURE — 770021 HCHG ROOM/CARE - ISO PRIVATE

## 2017-11-02 PROCEDURE — 700101 HCHG RX REV CODE 250: Performed by: INTERNAL MEDICINE

## 2017-11-02 PROCEDURE — 99232 SBSQ HOSP IP/OBS MODERATE 35: CPT | Performed by: INTERNAL MEDICINE

## 2017-11-02 PROCEDURE — 700111 HCHG RX REV CODE 636 W/ 250 OVERRIDE (IP): Performed by: INTERNAL MEDICINE

## 2017-11-02 RX ADMIN — VANCOMYCIN HYDROCHLORIDE 125 MG: 10 INJECTION, POWDER, LYOPHILIZED, FOR SOLUTION INTRAVENOUS at 13:03

## 2017-11-02 RX ADMIN — MORPHINE SULFATE 20 MG: 100 SOLUTION ORAL at 04:46

## 2017-11-02 RX ADMIN — POTASSIUM CHLORIDE, DEXTROSE MONOHYDRATE AND SODIUM CHLORIDE: 150; 5; 900 INJECTION, SOLUTION INTRAVENOUS at 15:31

## 2017-11-02 RX ADMIN — ONDANSETRON 4 MG: 4 TABLET, ORALLY DISINTEGRATING ORAL at 13:03

## 2017-11-02 RX ADMIN — FAMOTIDINE 20 MG: 10 INJECTION, SOLUTION INTRAVENOUS at 21:01

## 2017-11-02 RX ADMIN — MORPHINE SULFATE 20 MG: 100 SOLUTION ORAL at 21:01

## 2017-11-02 RX ADMIN — ENOXAPARIN SODIUM 40 MG: 100 INJECTION SUBCUTANEOUS at 09:24

## 2017-11-02 RX ADMIN — MORPHINE SULFATE 20 MG: 100 SOLUTION ORAL at 17:33

## 2017-11-02 RX ADMIN — POTASSIUM CHLORIDE, DEXTROSE MONOHYDRATE AND SODIUM CHLORIDE: 150; 5; 900 INJECTION, SOLUTION INTRAVENOUS at 05:51

## 2017-11-02 RX ADMIN — MORPHINE SULFATE 20 MG: 100 SOLUTION ORAL at 09:24

## 2017-11-02 RX ADMIN — MORPHINE SULFATE 20 MG: 100 SOLUTION ORAL at 13:03

## 2017-11-02 RX ADMIN — VANCOMYCIN HYDROCHLORIDE 125 MG: 10 INJECTION, POWDER, LYOPHILIZED, FOR SOLUTION INTRAVENOUS at 17:33

## 2017-11-02 RX ADMIN — FAMOTIDINE 20 MG: 10 INJECTION, SOLUTION INTRAVENOUS at 09:24

## 2017-11-02 RX ADMIN — VANCOMYCIN HYDROCHLORIDE 125 MG: 10 INJECTION, POWDER, LYOPHILIZED, FOR SOLUTION INTRAVENOUS at 05:49

## 2017-11-02 RX ADMIN — VANCOMYCIN HYDROCHLORIDE 125 MG: 10 INJECTION, POWDER, LYOPHILIZED, FOR SOLUTION INTRAVENOUS at 00:08

## 2017-11-02 RX ADMIN — ONDANSETRON 4 MG: 4 TABLET, ORALLY DISINTEGRATING ORAL at 00:08

## 2017-11-02 RX ADMIN — ONDANSETRON 4 MG: 4 TABLET, ORALLY DISINTEGRATING ORAL at 17:33

## 2017-11-02 RX ADMIN — MORPHINE SULFATE 20 MG: 100 SOLUTION ORAL at 01:25

## 2017-11-02 RX ADMIN — ONDANSETRON 4 MG: 4 TABLET, ORALLY DISINTEGRATING ORAL at 05:49

## 2017-11-02 ASSESSMENT — ENCOUNTER SYMPTOMS
FOCAL WEAKNESS: 0
ABDOMINAL PAIN: 1
VOMITING: 0
DEPRESSION: 0
DIARRHEA: 1
SORE THROAT: 0
HEADACHES: 0
NAUSEA: 0
SHORTNESS OF BREATH: 0
COUGH: 0
WEAKNESS: 1
HEARTBURN: 0
BACK PAIN: 0
CHILLS: 0
HALLUCINATIONS: 0
MYALGIAS: 0
PALPITATIONS: 0
DIZZINESS: 0
FEVER: 0
BLOOD IN STOOL: 0

## 2017-11-02 ASSESSMENT — PAIN SCALES - GENERAL
PAINLEVEL_OUTOF10: 9
PAINLEVEL_OUTOF10: 7
PAINLEVEL_OUTOF10: 7
PAINLEVEL_OUTOF10: 9
PAINLEVEL_OUTOF10: 8
PAINLEVEL_OUTOF10: 6
PAINLEVEL_OUTOF10: 5
PAINLEVEL_OUTOF10: 6
PAINLEVEL_OUTOF10: 7
PAINLEVEL_OUTOF10: 6
PAINLEVEL_OUTOF10: 6

## 2017-11-02 NOTE — CARE PLAN
Problem: Nutritional:  Goal: Achieve adequate nutritional intake  Patient will start diet and consume >50% of meals   Outcome: MET Date Met: 11/02/17  Patient eating >50% of most meals per chart (ADLs).

## 2017-11-02 NOTE — PROGRESS NOTES
Bedside report received.  Assessment complete.  A&O x 4. Patient calls appropriately.  Denies numbness and tingling. Moves all extremities.   Patient up with one assist and a walker.   Patient has 9/10 pain. Medicated per MAR.  Denies N&V. Tolerating gi soft diet.  LUQ G-tube, lap sites to dermabond.  + void via olmos with active order, + flatus  Patient denies SOB.  SCD's in place and on.  Review plan with of care with patient. Call light and personal belongings with in reach. Hourly rounding in place. All needs met at this time.

## 2017-11-02 NOTE — PROGRESS NOTES
Bedside report received.  Assessment complete.  A&O x 4. Patient calls appropriately.  Denies numbness and tingling. Moves all extremities.   Patient up with one assist and a FWW.  Patient has 9/10 pain. Medication provided  Denies N&V. Tolerating GI soft diet.  LUQ G tube in place, lap sites x 2 noted to dermabond.  positive void, positive flatus  Patient denies SOB.  SCD's on.  Review plan with of care with patient. Call light and personal belongings with in reach. Hourly rounding in place. All needs met at this time.

## 2017-11-02 NOTE — CARE PLAN
Problem: Pain Management  Goal: Pain level will decrease to patient's comfort goal  Outcome: PROGRESSING AS EXPECTED  Pharmacologic and non pharmacologic measures in place to help pt mange pain. Pt states pharmacologic measures are helpful in helping her control her pain.      Problem: Skin Integrity  Goal: Risk for impaired skin integrity will decrease  Outcome: PROGRESSING AS EXPECTED  Pt turns self as needed, mepilex in place to help prevent further issues with moisture fissure on pt's sacrum and right buttock wound. Barrier cream in place. Pt verbalizes and demonstrates understanding of needing to turn in bed. Call light within reach, pt instructed to call if she cannot reposition herself.

## 2017-11-02 NOTE — PROGRESS NOTES
[facility] Hospitalist Progress Note    Date of Service: 11/2/2017    Chief Complaint  62 y.o. Female with multiple cancers including breast, rectal with liver mets, and cervical,  admitted 10/12/2017 with abdominal pain. Found to have SBO. Initial conservative management failed, and thus surgery was consulted and had laparoscopy which showed carcinomatosis and malignant obstruction on the RLQ. No further surgical intervention done intra op, and gastrostomy tube was placed. Palliative care was consulted, and patient subsequently opted for hospice. Started on comfort measures. SW working on arranging home hospice.     Interval Problem Update  10/30/2017 - no overnight events. Remains hemodynamically stable and afebrile. Stable on 2L O2 NC.  Stool C diff PCR and toxin positive, negative NAP1-BI, started on PO vancomycin.    10/31/17: No events, diarrhea improved.  + abdominal Pain which is controlled.  11/1/17: She developed tachycardia last night, EKG with right bundle and first-degree A-V block however sinus tachycardia. No evidence of acute ischemia or A. fib. Patient is comfortable, refusing oral pain medications therefore will discontinue.    11/2/17: Family confirmed she would like to be FULL CODE to go home w hospice (still full code).  Still tachycardic, slightly dry, asymptomatic.    Consultants/Specialty  Surgery  Oncology  Palliative care team    Disposition  Monitor on the [facility]. Await home hospice to be arranged.       Review of Systems   Constitutional: Positive for malaise/fatigue. Negative for chills and fever.   HENT: Negative for sore throat.    Respiratory: Negative for cough and shortness of breath.    Cardiovascular: Negative for chest pain and palpitations.   Gastrointestinal: Positive for abdominal pain and diarrhea (Improved). Negative for blood in stool, heartburn, nausea and vomiting.   Genitourinary: Negative for dysuria and frequency.   Musculoskeletal: Negative for back pain and myalgias.    Neurological: Positive for weakness. Negative for dizziness, focal weakness and headaches.   Psychiatric/Behavioral: Negative for depression and hallucinations.   All other systems reviewed and are negative.       Pertinent positives/negatives as mentioned above.     A complete review of systems was done. All other systems were negative.      Physical Exam  Laboratory/Imaging   Hemodynamics  Temp (24hrs), Av.4 °C (97.6 °F), Min:36.1 °C (96.9 °F), Max:36.8 °C (98.3 °F)   Temperature: 36.1 °C (96.9 °F)  Pulse  Av.9  Min: 75  Max: 142    Blood Pressure: (!) 93/68 (RN Notified)      Respiratory      Respiration: 18, Pulse Oximetry: 100 %        RUL Breath Sounds: Diminished, RML Breath Sounds: Diminished, RLL Breath Sounds: Diminished, MERE Breath Sounds: Diminished, LLL Breath Sounds: Diminished    Fluids    Intake/Output Summary (Last 24 hours) at 17 1256  Last data filed at 17 0800   Gross per 24 hour   Intake             2680 ml   Output             3075 ml   Net             -395 ml       Nutrition  Orders Placed This Encounter   Procedures   • DIET ORDER     Standing Status:   Standing     Number of Occurrences:   1     Order Specific Question:   Diet:     Answer:   Low Fiber(GI Soft) [2]     Comments:   as tolerated     Physical Exam   Constitutional: She appears well-developed and well-nourished. No distress.   Listless   HENT:   Head: Normocephalic and atraumatic.   Mouth/Throat: No oropharyngeal exudate.   Eyes: Conjunctivae are normal. Pupils are equal, round, and reactive to light. No scleral icterus.   Neck: Normal range of motion. Neck supple.   Cardiovascular: Normal rate and regular rhythm.  Exam reveals no gallop and no friction rub.    No murmur heard.  Pulmonary/Chest: Effort normal and breath sounds normal. No respiratory distress. She has no wheezes. She has no rales. She exhibits no tenderness.   Abdominal: Soft. Bowel sounds are normal. She exhibits no distension. There is  tenderness (diffuse on palpation ). There is no rebound and no guarding.   gastrostomy tube in place   Genitourinary:   Genitourinary Comments: +olmos catheter in place   Musculoskeletal: She exhibits no edema or tenderness.   Lymphadenopathy:     She has no cervical adenopathy.   Neurological: She is alert. No cranial nerve deficit.   Skin: Skin is warm and dry. No rash noted. No erythema. No pallor.   Psychiatric: She has a normal mood and affect.   Nursing note and vitals reviewed.      Recent Labs      11/01/17   1541   WBC  11.8*   RBC  3.78*   HEMOGLOBIN  10.7*   HEMATOCRIT  33.7*   MCV  90.7   MCH  28.6   MCHC  31.5*   RDW  54.2*   PLATELETCT  288   MPV  10.4     Recent Labs      11/01/17   1541   SODIUM  136   POTASSIUM  3.8   CHLORIDE  101   CO2  29   GLUCOSE  127*   BUN  8   CREATININE  0.69   CALCIUM  7.5*                      Assessment/Plan     SBO (small bowel obstruction) due to malignant obstruction/carcinomatosis- (present on admission)   Assessment & Plan    - poor prognosis, nothing more that can be done, not even surgery.   - Continue PO diet for comfort.   -Continue pain control (PRN oral roxanol, and MS contin). Continue PRN antiemetics. Gastrostomy tube in place for palliation.           Anal carcinoma (CMS-HCC)- (present on admission)   Assessment & Plan    - await home hospice to be arranged.         Tachycardia   Assessment & Plan    Persistent in 120s for 48h. The patient is asymptomatic, states pain is well controlled.  EKG showed right bundle branch block/Long QT but no e/o afib.  -WBC high ?infection, repeat in AM  -Labs suggestive of mild volume depletion, continue IVF (repeat in AM)  -Consider PE, she is on lovenox but high risk on setting of cancer.  (high threshold to search due to plan to DC w hospice/futile tx)        Chronic pain- (present on admission)   Assessment & Plan    - continue comfort measure with oral roxanol PRN, and MS contin.        Normocytic anemia- (present on  admission)   Assessment & Plan    - Chronic, no sign of gross bleeding.        Rectal cancer (CMS-HCC)- (present on admission)   Assessment & Plan    - Patient is on experimental drug as an outpatient. Home hospice services being arranged. Dr. Sue agreed that hospice is appropriate level of care.        HTN (hypertension)- (present on admission)   Assessment & Plan    BP has actually been running on low end of normal  -Hold BP rx, resume of needed for comfort        Clostridium difficile infection   Assessment & Plan    - continue PO vancomycin x 14 days.             Reviewed items::  Labs reviewed, Radiology images reviewed and Medications reviewed  Ivan catheter::  Hospice / Comfort Care  DVT prophylaxis pharmacological::  Enoxaparin (Lovenox)  Ulcer Prophylaxis::  Yes

## 2017-11-02 NOTE — CARE PLAN
Problem: Safety  Goal: Will remain free from injury  Outcome: PROGRESSING AS EXPECTED  Patient free from accidental injury at this time. Patient calls appropriately, safety precautions in place. Hourly rounding in place.     Problem: Pain Management  Goal: Pain level will decrease to patient's comfort goal  Outcome: PROGRESSING AS EXPECTED  Patient experiencing pain relief with MAR medication, patient encouraged to call if pain worsens. Hourly rounding in place.

## 2017-11-02 NOTE — DISCHARGE PLANNING
Medical Social Work    This SW placed tc to Alec with [facility] who stated that they are having issues switching pt to [facility] and Medicaid is reporting they have not received application.    This SW placed pt on difficult discharge list as [facility] is the only agency that serves [facility].

## 2017-11-03 LAB
ALBUMIN SERPL BCP-MCNC: 1.7 G/DL (ref 3.2–4.9)
ANISOCYTOSIS BLD QL SMEAR: ABNORMAL
BASOPHILS # BLD AUTO: 0 % (ref 0–1.8)
BASOPHILS # BLD: 0 K/UL (ref 0–0.12)
BUN SERPL-MCNC: 7 MG/DL (ref 8–22)
CALCIUM SERPL-MCNC: 7.5 MG/DL (ref 8.5–10.5)
CHLORIDE SERPL-SCNC: 103 MMOL/L (ref 96–112)
CO2 SERPL-SCNC: 30 MMOL/L (ref 20–33)
CREAT SERPL-MCNC: 0.6 MG/DL (ref 0.5–1.4)
EOSINOPHIL # BLD AUTO: 0.09 K/UL (ref 0–0.51)
EOSINOPHIL NFR BLD: 0.9 % (ref 0–6.9)
ERYTHROCYTE [DISTWIDTH] IN BLOOD BY AUTOMATED COUNT: 54.3 FL (ref 35.9–50)
GFR SERPL CREATININE-BSD FRML MDRD: >60 ML/MIN/1.73 M 2
GLUCOSE SERPL-MCNC: 112 MG/DL (ref 65–99)
HCT VFR BLD AUTO: 32.6 % (ref 37–47)
HGB BLD-MCNC: 10.2 G/DL (ref 12–16)
LYMPHOCYTES # BLD AUTO: 1.91 K/UL (ref 1–4.8)
LYMPHOCYTES NFR BLD: 19.1 % (ref 22–41)
MACROCYTES BLD QL SMEAR: ABNORMAL
MANUAL DIFF BLD: NORMAL
MCH RBC QN AUTO: 28.6 PG (ref 27–33)
MCHC RBC AUTO-ENTMCNC: 31.3 G/DL (ref 33.6–35)
MCV RBC AUTO: 91.3 FL (ref 81.4–97.8)
MONOCYTES # BLD AUTO: 0.78 K/UL (ref 0–0.85)
MONOCYTES NFR BLD AUTO: 7.8 % (ref 0–13.4)
MORPHOLOGY BLD-IMP: NORMAL
NEUTROPHILS # BLD AUTO: 7.22 K/UL (ref 2–7.15)
NEUTROPHILS NFR BLD: 72.2 % (ref 44–72)
NRBC # BLD AUTO: 0 K/UL
NRBC BLD AUTO-RTO: 0 /100 WBC
PHOSPHATE SERPL-MCNC: 2.3 MG/DL (ref 2.5–4.5)
PLATELET # BLD AUTO: 242 K/UL (ref 164–446)
PLATELET BLD QL SMEAR: NORMAL
PMV BLD AUTO: 10.4 FL (ref 9–12.9)
POTASSIUM SERPL-SCNC: 4.2 MMOL/L (ref 3.6–5.5)
RBC # BLD AUTO: 3.57 M/UL (ref 4.2–5.4)
RBC BLD AUTO: PRESENT
SMUDGE CELLS BLD QL SMEAR: NORMAL
SODIUM SERPL-SCNC: 135 MMOL/L (ref 135–145)
WBC # BLD AUTO: 10 K/UL (ref 4.8–10.8)

## 2017-11-03 PROCEDURE — A9270 NON-COVERED ITEM OR SERVICE: HCPCS | Performed by: INTERNAL MEDICINE

## 2017-11-03 PROCEDURE — 700111 HCHG RX REV CODE 636 W/ 250 OVERRIDE (IP): Performed by: INTERNAL MEDICINE

## 2017-11-03 PROCEDURE — 770021 HCHG ROOM/CARE - ISO PRIVATE

## 2017-11-03 PROCEDURE — 99232 SBSQ HOSP IP/OBS MODERATE 35: CPT | Performed by: INTERNAL MEDICINE

## 2017-11-03 PROCEDURE — 700102 HCHG RX REV CODE 250 W/ 637 OVERRIDE(OP): Performed by: INTERNAL MEDICINE

## 2017-11-03 PROCEDURE — 85007 BL SMEAR W/DIFF WBC COUNT: CPT

## 2017-11-03 PROCEDURE — 80069 RENAL FUNCTION PANEL: CPT

## 2017-11-03 PROCEDURE — 700101 HCHG RX REV CODE 250: Performed by: INTERNAL MEDICINE

## 2017-11-03 PROCEDURE — 700111 HCHG RX REV CODE 636 W/ 250 OVERRIDE (IP): Performed by: SURGERY

## 2017-11-03 PROCEDURE — 85027 COMPLETE CBC AUTOMATED: CPT

## 2017-11-03 RX ORDER — VANCOMYCIN HYDROCHLORIDE 125 MG/1
125 CAPSULE ORAL 4 TIMES DAILY
Qty: 40 CAP | Refills: 0 | Status: SHIPPED | OUTPATIENT
Start: 2017-11-03

## 2017-11-03 RX ADMIN — POTASSIUM CHLORIDE, DEXTROSE MONOHYDRATE AND SODIUM CHLORIDE: 150; 5; 900 INJECTION, SOLUTION INTRAVENOUS at 11:15

## 2017-11-03 RX ADMIN — ONDANSETRON 4 MG: 4 TABLET, ORALLY DISINTEGRATING ORAL at 17:01

## 2017-11-03 RX ADMIN — VANCOMYCIN HYDROCHLORIDE 125 MG: 10 INJECTION, POWDER, LYOPHILIZED, FOR SOLUTION INTRAVENOUS at 00:22

## 2017-11-03 RX ADMIN — ENOXAPARIN SODIUM 40 MG: 100 INJECTION SUBCUTANEOUS at 09:01

## 2017-11-03 RX ADMIN — MORPHINE SULFATE 20 MG: 100 SOLUTION ORAL at 09:02

## 2017-11-03 RX ADMIN — ONDANSETRON 4 MG: 4 TABLET, ORALLY DISINTEGRATING ORAL at 00:23

## 2017-11-03 RX ADMIN — MORPHINE SULFATE 20 MG: 100 SOLUTION ORAL at 13:07

## 2017-11-03 RX ADMIN — MORPHINE SULFATE 20 MG: 100 SOLUTION ORAL at 16:29

## 2017-11-03 RX ADMIN — ONDANSETRON 4 MG: 4 TABLET, ORALLY DISINTEGRATING ORAL at 05:54

## 2017-11-03 RX ADMIN — MORPHINE SULFATE 20 MG: 100 SOLUTION ORAL at 00:45

## 2017-11-03 RX ADMIN — VANCOMYCIN HYDROCHLORIDE 125 MG: 10 INJECTION, POWDER, LYOPHILIZED, FOR SOLUTION INTRAVENOUS at 11:10

## 2017-11-03 RX ADMIN — ONDANSETRON 4 MG: 4 TABLET, ORALLY DISINTEGRATING ORAL at 11:10

## 2017-11-03 RX ADMIN — MORPHINE SULFATE 20 MG: 100 SOLUTION ORAL at 05:55

## 2017-11-03 RX ADMIN — VANCOMYCIN HYDROCHLORIDE 125 MG: 10 INJECTION, POWDER, LYOPHILIZED, FOR SOLUTION INTRAVENOUS at 05:55

## 2017-11-03 RX ADMIN — MORPHINE SULFATE 20 MG: 100 SOLUTION ORAL at 19:56

## 2017-11-03 RX ADMIN — POTASSIUM CHLORIDE, DEXTROSE MONOHYDRATE AND SODIUM CHLORIDE: 150; 5; 900 INJECTION, SOLUTION INTRAVENOUS at 01:54

## 2017-11-03 RX ADMIN — FAMOTIDINE 20 MG: 10 INJECTION, SOLUTION INTRAVENOUS at 09:01

## 2017-11-03 RX ADMIN — POTASSIUM CHLORIDE, DEXTROSE MONOHYDRATE AND SODIUM CHLORIDE: 150; 5; 900 INJECTION, SOLUTION INTRAVENOUS at 19:56

## 2017-11-03 RX ADMIN — FAMOTIDINE 20 MG: 10 INJECTION, SOLUTION INTRAVENOUS at 19:56

## 2017-11-03 RX ADMIN — VANCOMYCIN HYDROCHLORIDE 125 MG: 10 INJECTION, POWDER, LYOPHILIZED, FOR SOLUTION INTRAVENOUS at 17:01

## 2017-11-03 ASSESSMENT — ENCOUNTER SYMPTOMS
FEVER: 0
FOCAL WEAKNESS: 0
SORE THROAT: 0
BACK PAIN: 0
CHILLS: 0
SHORTNESS OF BREATH: 0
DIZZINESS: 0
VOMITING: 0
HALLUCINATIONS: 0
HEADACHES: 0
MYALGIAS: 0
DEPRESSION: 0
BLOOD IN STOOL: 0
HEARTBURN: 0
WEAKNESS: 1
COUGH: 0
PALPITATIONS: 0
NAUSEA: 0

## 2017-11-03 ASSESSMENT — PAIN SCALES - GENERAL
PAINLEVEL_OUTOF10: 6
PAINLEVEL_OUTOF10: 6
PAINLEVEL_OUTOF10: 7
PAINLEVEL_OUTOF10: 9
PAINLEVEL_OUTOF10: 9
PAINLEVEL_OUTOF10: 7
PAINLEVEL_OUTOF10: 5
PAINLEVEL_OUTOF10: 9
PAINLEVEL_OUTOF10: 8
PAINLEVEL_OUTOF10: 6

## 2017-11-03 NOTE — PROGRESS NOTES
AAOx4. HR elevated at 134. Reporting 9/10 pain to the abdomen; medicated per MAR. LUQ Gtube in place draining to gravity; flushed at beginning of shit. Dressing CDI. Ivan catheter in place. Special contact isolation for cdiff; LB on 10/30/17. No other concerns at this time.

## 2017-11-03 NOTE — PROGRESS NOTES
[facility] Hospitalist Progress Note    Date of Service: 11/3/2017    Chief Complaint  62 y.o. Female with multiple cancers including breast, rectal with liver mets, and cervical,  admitted 10/12/2017 with abdominal pain. Found to have SBO. Initial conservative management failed, and thus surgery was consulted and had laparoscopy which showed carcinomatosis and malignant obstruction on the RLQ. No further surgical intervention done intra op, and gastrostomy tube was placed. Palliative care was consulted, and patient subsequently opted for hospice. Started on comfort measures. SW working on arranging home hospice.     Interval Problem Update  Patient states that she is not having that much pain but when she eats she feels she gets full very quickly and have to stop eating and feels very bloated. Hospice acceptance obtained late in the afternoon, equipment to be set up prior to discharge.  Discussed dietary modifications with patient and family  Patient seen with RN.    Consultants/Specialty  Surgery  Oncology  Palliative care team    Disposition  Pending DC home with hospice once equipment arranged.      Review of Systems   Constitutional: Positive for malaise/fatigue. Negative for chills and fever.   HENT: Negative for sore throat.    Respiratory: Negative for cough and shortness of breath.    Cardiovascular: Positive for leg swelling. Negative for chest pain and palpitations.   Gastrointestinal: Positive for abdominal pain (mild- more bloating). Negative for blood in stool, diarrhea (resolved- no BM X 5 days), heartburn, nausea and vomiting.   Genitourinary: Negative for dysuria and frequency.   Musculoskeletal: Negative for back pain and myalgias.   Neurological: Positive for weakness. Negative for dizziness, focal weakness and headaches.   Psychiatric/Behavioral: Negative for depression and hallucinations.   All other systems reviewed and are negative.       Pertinent positives/negatives as mentioned above.     A  complete review of systems was done. All other systems were negative.      Physical Exam  Laboratory/Imaging   Hemodynamics  Temp (24hrs), Av.5 °C (97.7 °F), Min:36.1 °C (97 °F), Max:36.9 °C (98.5 °F)   Temperature: 36.8 °C (98.2 °F)  Pulse  Av.5  Min: 75  Max: 142    Blood Pressure: 102/68      Respiratory      Respiration: 18, Pulse Oximetry: 96 %        RUL Breath Sounds: Diminished, RML Breath Sounds: Diminished, RLL Breath Sounds: Diminished, MERE Breath Sounds: Diminished, LLL Breath Sounds: Diminished    Fluids    Intake/Output Summary (Last 24 hours) at 17 1528  Last data filed at 17 1415   Gross per 24 hour   Intake             1000 ml   Output             2750 ml   Net            -1750 ml       Nutrition  Orders Placed This Encounter   Procedures   • DIET ORDER     Standing Status:   Standing     Number of Occurrences:   1     Order Specific Question:   Diet:     Answer:   Full Liquid [11]     Comments:   Eggs okay     Physical Exam   Constitutional: She appears well-developed and well-nourished. No distress.   obese   HENT:   Head: Normocephalic and atraumatic.   Mouth/Throat: No oropharyngeal exudate.   Eyes: Conjunctivae are normal. Pupils are equal, round, and reactive to light. No scleral icterus.   Neck: Normal range of motion. Neck supple.   Cardiovascular: Normal rate and regular rhythm.  Exam reveals no gallop and no friction rub.    No murmur heard.  Pulmonary/Chest: Effort normal. No respiratory distress. She has no wheezes. She has no rales. She exhibits no tenderness.   Decreased in bases   Abdominal: Soft. She exhibits no distension. There is tenderness (minimal diffuse). There is no rebound and no guarding.   Decreased BS   Genitourinary:   Genitourinary Comments: +olmos catheter in place   Musculoskeletal: She exhibits edema (anasarca). She exhibits no tenderness.   Lymphadenopathy:     She has no cervical adenopathy.   Neurological: She is alert. No cranial nerve  deficit.   Skin: Skin is warm and dry. No rash noted. No erythema. No pallor.   Psychiatric: Cognition and memory are impaired.   flat   Nursing note and vitals reviewed.      Recent Labs      11/01/17   1541  11/03/17   0045   WBC  11.8*  10.0   RBC  3.78*  3.57*   HEMOGLOBIN  10.7*  10.2*   HEMATOCRIT  33.7*  32.6*   MCV  90.7  91.3   MCH  28.6  28.6   MCHC  31.5*  31.3*   RDW  54.2*  54.3*   PLATELETCT  288  242   MPV  10.4  10.4     Recent Labs      11/01/17   1541  11/03/17   0045   SODIUM  136  135   POTASSIUM  3.8  4.2   CHLORIDE  101  103   CO2  29  30   GLUCOSE  127*  112*   BUN  8  7*   CREATININE  0.69  0.60   CALCIUM  7.5*  7.5*                      Assessment/Plan     SBO (small bowel obstruction) due to malignant obstruction/carcinomatosis- (present on admission)   Assessment & Plan    - poor prognosis, nothing more that can be done, not even surgery.   - Continue PO diet for comfort- earlyt satiety and bloating may be texture related. Fulls recommended, may have eggs  -Continue pain control (PRN oral roxanol, and MS contin). Continue PRN antiemetics. Gastrostomy tube in place for palliation.           Anal carcinoma (CMS-HCC)- (present on admission)   Assessment & Plan    Pending d/c on hospice.  Patient doesn't seem to be able to process complex decisions presently        Low urine output- (present on admission)   Assessment & Plan    Likely lack of nutrition/ hydration and 3rd spacing of fluids        Tachycardia   Assessment & Plan    Improved  Patient to DC home on hospice, extensive workup not indicated        Chronic pain- (present on admission)   Assessment & Plan    - continue comfort measure with oral roxanol PRN, and MS contin.        Normocytic anemia- (present on admission)   Assessment & Plan    - Chronic, no sign of gross bleeding.        Rectal cancer (CMS-HCC)- (present on admission)   Assessment & Plan    - Patient is on experimental drug as an outpatient. Home hospice services being  arranged. Dr. Sue agreed that hospice is appropriate level of care.        HTN (hypertension)- (present on admission)   Assessment & Plan    BP meds d/c'd        Clostridium difficile infection   Assessment & Plan    - continue PO vancomycin x 14 days. Total  No BM in 5 days- no food to process            Reviewed items::  Labs reviewed and Medications reviewed  Ivan catheter::  Hospice / Comfort Care  DVT prophylaxis pharmacological::  Enoxaparin (Lovenox)  Ulcer Prophylaxis::  Yes

## 2017-11-03 NOTE — PROGRESS NOTES
Pt A&Ox4, sitting up in bed for breakfast. C/o pain 6/10 to abdomen, medicated per MAR. G-tube in lace, draining to gravity. G-tube flushed and patent. Dressing CDI. Ivan in place, minimal output. Pt running tachy between  this AM. Pt on isolation for c-diff, LBM 10/30/17. +BS, +flatus. Saturating >90% on 2L NC. Encouraged deep breathing and coughing. Encouraged ambulation, pt agreeable to get up after lunch. Bed locked in low, call light within reach. Hourly rounding in place.

## 2017-11-03 NOTE — DISCHARGE PLANNING
This SW received call from [facility] who reported that they were contact [facility] hospice for pt and would let this SW know when or if hospice has been approved.

## 2017-11-03 NOTE — CARE PLAN
Problem: Venous Thromboembolism (VTW)/Deep Vein Thrombosis (DVT) Prevention:  Goal: Patient will participate in Venous Thrombosis (VTE)/Deep Vein Thrombosis (DVT)Prevention Measures  Outcome: PROGRESSING AS EXPECTED  Lovenox, SCD    Problem: Pain Management  Goal: Pain level will decrease to patient's comfort goal  Outcome: PROGRESSING AS EXPECTED  Controlled on PRN medications and use of ice packs    Problem: Fluid Volume:  Goal: Will maintain balanced intake and output  Outcome: PROGRESSING AS EXPECTED  Receiving IVF, minimal urine output    Problem: Respiratory:  Goal: Respiratory status will improve  Outcome: PROGRESSING AS EXPECTED  Encouraged use of IS. Saturating >90% on 2L NC

## 2017-11-03 NOTE — DISCHARGE PLANNING
Per [facility] Janee request, referral sent to [facility].  Arun from [facility] is a bedside and will review patient for admission.

## 2017-11-03 NOTE — DISCHARGE PLANNING
Medical Social Work    Per Arun with [facility], they serve the [place] area and are willing to accept pt. Faxed referral to [facility].

## 2017-11-03 NOTE — PROGRESS NOTES
[facility] hospice at bedside. Pt accepted and would like to go home with hospice tonight. Updated hospitalist. Awaiting d/c summary for [facility].     Spoke with Carrie from [facility]. She is working to get all equipment set up for home. She states this may take a little while, pt may have to be d/c tomorrow morning. Carrie to call back and confirm home equipment before d/c.

## 2017-11-03 NOTE — DISCHARGE PLANNING
Lucrecia informed by bedside RN that pt family is here but going to leave for the day. Per [facility] pt is accepted by [facility] and they will be coming up to pt room soon to speak with pt and family. Otherwise pt will be approved to go home once hospice speaks to them. Pt to leave by WC with sister possibly.

## 2017-11-04 VITALS
RESPIRATION RATE: 16 BRPM | SYSTOLIC BLOOD PRESSURE: 98 MMHG | DIASTOLIC BLOOD PRESSURE: 67 MMHG | WEIGHT: 195.55 LBS | BODY MASS INDEX: 30.69 KG/M2 | HEART RATE: 123 BPM | TEMPERATURE: 97.2 F | OXYGEN SATURATION: 95 % | HEIGHT: 67 IN

## 2017-11-04 PROBLEM — R34 LOW URINE OUTPUT: Status: ACTIVE | Noted: 2017-11-04

## 2017-11-04 PROCEDURE — A9270 NON-COVERED ITEM OR SERVICE: HCPCS | Performed by: INTERNAL MEDICINE

## 2017-11-04 PROCEDURE — 700102 HCHG RX REV CODE 250 W/ 637 OVERRIDE(OP): Performed by: INTERNAL MEDICINE

## 2017-11-04 PROCEDURE — 700111 HCHG RX REV CODE 636 W/ 250 OVERRIDE (IP): Performed by: SURGERY

## 2017-11-04 PROCEDURE — 700111 HCHG RX REV CODE 636 W/ 250 OVERRIDE (IP): Performed by: INTERNAL MEDICINE

## 2017-11-04 PROCEDURE — 99239 HOSP IP/OBS DSCHRG MGMT >30: CPT | Performed by: INTERNAL MEDICINE

## 2017-11-04 PROCEDURE — 700101 HCHG RX REV CODE 250: Performed by: INTERNAL MEDICINE

## 2017-11-04 RX ORDER — FAMOTIDINE 20 MG/1
20 TABLET, FILM COATED ORAL 2 TIMES DAILY
Status: DISCONTINUED | OUTPATIENT
Start: 2017-11-04 | End: 2017-11-04 | Stop reason: HOSPADM

## 2017-11-04 RX ADMIN — ONDANSETRON 4 MG: 4 TABLET, ORALLY DISINTEGRATING ORAL at 06:04

## 2017-11-04 RX ADMIN — VANCOMYCIN HYDROCHLORIDE 125 MG: 10 INJECTION, POWDER, LYOPHILIZED, FOR SOLUTION INTRAVENOUS at 06:04

## 2017-11-04 RX ADMIN — VANCOMYCIN HYDROCHLORIDE 125 MG: 10 INJECTION, POWDER, LYOPHILIZED, FOR SOLUTION INTRAVENOUS at 00:00

## 2017-11-04 RX ADMIN — ONDANSETRON 4 MG: 4 TABLET, ORALLY DISINTEGRATING ORAL at 00:00

## 2017-11-04 RX ADMIN — MORPHINE SULFATE 20 MG: 100 SOLUTION ORAL at 00:00

## 2017-11-04 RX ADMIN — ENOXAPARIN SODIUM 40 MG: 100 INJECTION SUBCUTANEOUS at 07:42

## 2017-11-04 RX ADMIN — POTASSIUM CHLORIDE, DEXTROSE MONOHYDRATE AND SODIUM CHLORIDE: 150; 5; 900 INJECTION, SOLUTION INTRAVENOUS at 07:42

## 2017-11-04 RX ADMIN — MORPHINE SULFATE 20 MG: 100 SOLUTION ORAL at 11:43

## 2017-11-04 RX ADMIN — HEPARIN 500 UNITS: 100 SYRINGE at 12:45

## 2017-11-04 RX ADMIN — MORPHINE SULFATE 20 MG: 100 SOLUTION ORAL at 06:04

## 2017-11-04 RX ADMIN — FAMOTIDINE 20 MG: 10 INJECTION, SOLUTION INTRAVENOUS at 07:42

## 2017-11-04 RX ADMIN — ONDANSETRON 4 MG: 4 TABLET, ORALLY DISINTEGRATING ORAL at 11:43

## 2017-11-04 RX ADMIN — VANCOMYCIN HYDROCHLORIDE 125 MG: 10 INJECTION, POWDER, LYOPHILIZED, FOR SOLUTION INTRAVENOUS at 11:43

## 2017-11-04 ASSESSMENT — PAIN SCALES - GENERAL
PAINLEVEL_OUTOF10: 6
PAINLEVEL_OUTOF10: 4
PAINLEVEL_OUTOF10: 5
PAINLEVEL_OUTOF10: 9
PAINLEVEL_OUTOF10: 8

## 2017-11-04 ASSESSMENT — ENCOUNTER SYMPTOMS
DIARRHEA: 0
ABDOMINAL PAIN: 1

## 2017-11-04 NOTE — CARE PLAN
Problem: Safety  Goal: Will remain free from injury  Outcome: PROGRESSING AS EXPECTED  Bed locked and in lowest position. Call light within reach. Patient calls appropriately.    Problem: Venous Thromboembolism (VTW)/Deep Vein Thrombosis (DVT) Prevention:  Goal: Patient will participate in Venous Thrombosis (VTE)/Deep Vein Thrombosis (DVT)Prevention Measures  Outcome: PROGRESSING AS EXPECTED  SCDs worn by patient.

## 2017-11-04 NOTE — PROGRESS NOTES
Discharging Patient home per physician order.  Discharged with [facility] transport van.  Demonstrated understanding of discharge instructions, follow up appointments, home medications, prescriptions, home care for surgical wound, and nursing care instructions for small bowel obstruction, cancer, hospice.  Ambulating without assistance, voiding without difficulty, pain well controlled, tolerating oral medications, oxygen saturation greater than 90% on 2L NC, tolerating diet. Educational handouts  given and discussed.  Verbalized understanding of discharge instructions and educational handouts.  All questions answered.  Patient able to state several reasons why to return to the ED or seek medical attention. Belongings with patient at time of discharge.

## 2017-11-04 NOTE — DISCHARGE PLANNING
Per [facility] Berta request, transport has been arranged for patient to transfer to her home today at 1200 via the [facility] van.  Job number: [id number].  [facility] Asif has been advised of transport time.

## 2017-11-04 NOTE — PROGRESS NOTES
Assessment completed. AA&Ox4. HR Elevated at 129. Reporting 9/10 pain to the abdomen; medicated per MAR. G-tube draining to gravity with green/brown output; flushed at beginning of shift. Dressing CDI. All questions answered; no other concerns at this time.

## 2017-11-04 NOTE — PALLIATIVE CARE
PALLIATIVE CARE FOLLOW UP:  Patient to discharge today with [facility]. Well wishes provided from the palliative care team and  Jono. Patient/family encouraged to call with any questions, needs or concerns.     Thank you for allowing Palliative Care to follow this patient. Please contact us at  with any questions.

## 2017-11-04 NOTE — DISCHARGE SUMMARY
CHIEF COMPLAINT ON ADMISSION  Chief Complaint   Patient presents with   • Abdominal Pain     x2 days.  History of breast, cervical and rectal cancer with mets to liver.  Currently on clinical trial, PO chemo.       CODE STATUS  Full Code    HPI & HOSPITAL COURSE  62 y.o. Female with multiple cancers including breast, rectal with liver mets, and cervical,  admitted 10/12/2017 with abdominal pain. Found to have SBO. Initial conservative management failed, and thus surgery was consulted and had laparoscopy which showed carcinomatosis and malignant obstruction on the RLQ. No further surgical intervention done intra op, and gastrostomy tube was placed. Palliative care was consulted, and patient subsequently opted for hospice. Despite this she wishes to be resuscitated, Palliative care was unable to convince her otherwise. Patient had Gastrotomy tube placed to allow her to eat, which was becoming congested with solid foods. Diet was discussed in detail with patient and family @ d/c. She developed c dif and diarrhea during her stay, this seemed improved after PO vanco, however it is unclear how much vancomycin reached her colon    Therefore, she is discharged in guarded and stable condition with close outpatient follow-up.    SPECIFIC OUTPATIENT FOLLOW-UP  Hospice    DISCHARGE PROBLEM LIST  Active Problems:    SBO (small bowel obstruction) due to malignant obstruction/carcinomatosis POA: Yes    Anal carcinoma (CMS-HCC) POA: Yes    Clostridium difficile infection POA: No    HTN (hypertension) POA: Yes    Rectal cancer (CMS-HCC) POA: Yes    Normocytic anemia POA: Yes    Chronic pain POA: Yes    Tachycardia POA: No    Low urine output POA: Yes  Resolved Problems:    UTI (urinary tract infection) POA: Yes    Hypokalemia POA: Yes    Hyponatremia POA: Yes    Sepsis (CMS-HCC) POA: Yes    Chest pain POA: Yes      FOLLOW UP  No future appointments.  REMY Del Rio  [facility]  [address]  [place]  [address]  [phone]    Schedule an appointment as soon as possible for a visit in 2 weeks  Follow-up appointment    [facility] [place] ([facility])  [address]  [place] [address]  [phone]          MEDICATIONS ON DISCHARGE   Strange, Marilyn Cox   Home Medication Instructions MYLA:[id number]    Printed on:11/04/17 6282   Medication Information                      Fluticasone Furoate-Vilanterol (BREO ELLIPTA) 100-25 MCG/INH AEROSOL POWDER, BREATH ACTIVATED  Inhale 1 Puff by mouth every day.             ondansetron (ZOFRAN ODT) 4 MG TABLET DISPERSIBLE  Take 1 Tab by mouth every four hours as needed for Nausea/Vomiting (give PO if no IV route available).             vancomycin (VANCOCIN) 125 MG capsule  Take 1 Cap by mouth 4 times a day.                 DIET  Orders Placed This Encounter   Procedures   • DIET ORDER     Standing Status:   Standing     Number of Occurrences:   1     Order Specific Question:   Diet:     Answer:   Full Liquid [11]     Comments:   Eggs okay       ACTIVITY  As tolerated      CONSULTATIONS  Oncology- [facility]- Surgery  Palliative care    PROCEDURES  Laparoscopy & Gatstrotomy- [place] 10/20/2017    LABORATORY  Lab Results   Component Value Date/Time    SODIUM 135 11/03/2017 12:45 AM    POTASSIUM 4.2 11/03/2017 12:45 AM    CHLORIDE 103 11/03/2017 12:45 AM    CO2 30 11/03/2017 12:45 AM    GLUCOSE 112 (H) 11/03/2017 12:45 AM    BUN 7 (L) 11/03/2017 12:45 AM    CREATININE 0.60 11/03/2017 12:45 AM    CREATININE 0.7 01/08/2007 06:15 AM        Lab Results   Component Value Date/Time    WBC 10.0 11/03/2017 12:45 AM    HEMOGLOBIN 10.2 (L) 11/03/2017 12:45 AM    HEMATOCRIT 32.6 (L) 11/03/2017 12:45 AM    PLATELETCT 242 11/03/2017 12:45 AM        Total time of the discharge process exceeds 40 minutes

## 2017-11-04 NOTE — DISCHARGE PLANNING
Medical Social Work    This SW and [facility] met at bedside with pt and family to inform them that pt has been accepted. Pt's family was very grateful. Pt is expected to leave tomorrow morning going home with sister Madeleine.     Plan: SW to assist with any additional needs.

## 2017-11-04 NOTE — DISCHARGE PLANNING
Medical Social Work    Spoke with Carrie with [facility]. Carrie stated pt has all DME she needs and is able to DC home today. Obtained address pt is going to from bedside RN-[facility] [place]. Per RN pt can get into a WC.     Transportation form completed and faxed to [facility] for transportation home via [employer]

## 2017-11-04 NOTE — PROGRESS NOTES
Pt A&Ox4, sitting up in bed. Minimal complaints of pain this AM, pt states less distention in abdomen than yesterday. G-tube in place, flushed and patent, draining to gravity. Dressing CDI. Ivan observed, small output. Pt saturating >90% on 2 L NC. No complaints at this time. Pt eager to go home today, planning for discharge. Bed locked in low, call light within reach. Hourly rounding in place.

## 2017-11-04 NOTE — DISCHARGE PLANNING
Medical Social Work    Transportation time arranged for 12:00 by [facility]. Updated bedside RN and Carrie with [facility]. Carrie stated she will have her nurse meet pt at home.

## 2021-03-03 DIAGNOSIS — Z23 NEED FOR VACCINATION: ICD-10-CM
